# Patient Record
Sex: MALE | HISPANIC OR LATINO | Employment: PART TIME | URBAN - METROPOLITAN AREA
[De-identification: names, ages, dates, MRNs, and addresses within clinical notes are randomized per-mention and may not be internally consistent; named-entity substitution may affect disease eponyms.]

---

## 2022-11-30 ENCOUNTER — HOSPITAL ENCOUNTER (INPATIENT)
Facility: MEDICAL CENTER | Age: 57
LOS: 15 days | DRG: 025 | End: 2022-12-15
Attending: EMERGENCY MEDICINE | Admitting: HOSPITALIST
Payer: COMMERCIAL

## 2022-11-30 ENCOUNTER — APPOINTMENT (OUTPATIENT)
Dept: RADIOLOGY | Facility: MEDICAL CENTER | Age: 57
DRG: 025 | End: 2022-11-30
Attending: EMERGENCY MEDICINE
Payer: COMMERCIAL

## 2022-11-30 DIAGNOSIS — G93.6 CEREBRAL EDEMA (HCC): ICD-10-CM

## 2022-11-30 DIAGNOSIS — D72.829 LEUKOCYTOSIS, UNSPECIFIED TYPE: ICD-10-CM

## 2022-11-30 DIAGNOSIS — R51.9 NONINTRACTABLE HEADACHE, UNSPECIFIED CHRONICITY PATTERN, UNSPECIFIED HEADACHE TYPE: ICD-10-CM

## 2022-11-30 DIAGNOSIS — R26.81 GAIT INSTABILITY: ICD-10-CM

## 2022-11-30 DIAGNOSIS — G93.89 BRAIN MASS: ICD-10-CM

## 2022-11-30 DIAGNOSIS — R10.84 GENERALIZED ABDOMINAL PAIN: ICD-10-CM

## 2022-11-30 DIAGNOSIS — I63.531 ACUTE ISCHEMIC RIGHT PCA STROKE (HCC): ICD-10-CM

## 2022-11-30 DIAGNOSIS — I10 PRIMARY HYPERTENSION: Chronic | ICD-10-CM

## 2022-11-30 DIAGNOSIS — D32.0 MENINGIOMA, CEREBRAL (HCC): ICD-10-CM

## 2022-11-30 DIAGNOSIS — G93.5 BRAIN COMPRESSION (HCC): ICD-10-CM

## 2022-11-30 DIAGNOSIS — Z99.11 ON MECHANICALLY ASSISTED VENTILATION (HCC): ICD-10-CM

## 2022-11-30 DIAGNOSIS — G89.18 POSTOPERATIVE PAIN: ICD-10-CM

## 2022-11-30 PROBLEM — E87.1 HYPONATREMIA: Status: ACTIVE | Noted: 2022-11-30

## 2022-11-30 PROBLEM — R73.9 HYPERGLYCEMIA: Status: ACTIVE | Noted: 2022-11-30

## 2022-11-30 LAB
EST. AVERAGE GLUCOSE BLD GHB EST-MCNC: 137 MG/DL
HBA1C MFR BLD: 6.4 % (ref 4–5.6)
MAGNESIUM SERPL-MCNC: 1.9 MG/DL (ref 1.5–2.5)

## 2022-11-30 PROCEDURE — 99223 1ST HOSP IP/OBS HIGH 75: CPT | Performed by: HOSPITALIST

## 2022-11-30 PROCEDURE — 36415 COLL VENOUS BLD VENIPUNCTURE: CPT

## 2022-11-30 PROCEDURE — 770001 HCHG ROOM/CARE - MED/SURG/GYN PRIV*

## 2022-11-30 PROCEDURE — 83036 HEMOGLOBIN GLYCOSYLATED A1C: CPT

## 2022-11-30 PROCEDURE — 700102 HCHG RX REV CODE 250 W/ 637 OVERRIDE(OP): Performed by: HOSPITALIST

## 2022-11-30 PROCEDURE — 700117 HCHG RX CONTRAST REV CODE 255: Performed by: EMERGENCY MEDICINE

## 2022-11-30 PROCEDURE — 71260 CT THORAX DX C+: CPT

## 2022-11-30 PROCEDURE — A9270 NON-COVERED ITEM OR SERVICE: HCPCS | Performed by: HOSPITALIST

## 2022-11-30 PROCEDURE — 99285 EMERGENCY DEPT VISIT HI MDM: CPT

## 2022-11-30 PROCEDURE — 83735 ASSAY OF MAGNESIUM: CPT

## 2022-11-30 RX ORDER — POLYETHYLENE GLYCOL 3350 17 G/17G
1 POWDER, FOR SOLUTION ORAL
Status: DISCONTINUED | OUTPATIENT
Start: 2022-11-30 | End: 2022-12-07

## 2022-11-30 RX ORDER — DEXAMETHASONE SODIUM PHOSPHATE 4 MG/ML
4 INJECTION, SOLUTION INTRA-ARTICULAR; INTRALESIONAL; INTRAMUSCULAR; INTRAVENOUS; SOFT TISSUE EVERY 6 HOURS
Status: DISCONTINUED | OUTPATIENT
Start: 2022-12-01 | End: 2022-12-10

## 2022-11-30 RX ORDER — BISACODYL 10 MG
10 SUPPOSITORY, RECTAL RECTAL
Status: DISCONTINUED | OUTPATIENT
Start: 2022-11-30 | End: 2022-12-07

## 2022-11-30 RX ORDER — ONDANSETRON 2 MG/ML
4 INJECTION INTRAMUSCULAR; INTRAVENOUS EVERY 4 HOURS PRN
Status: DISCONTINUED | OUTPATIENT
Start: 2022-11-30 | End: 2022-12-07

## 2022-11-30 RX ORDER — ONDANSETRON 4 MG/1
4 TABLET, ORALLY DISINTEGRATING ORAL EVERY 4 HOURS PRN
Status: DISCONTINUED | OUTPATIENT
Start: 2022-11-30 | End: 2022-12-15 | Stop reason: HOSPADM

## 2022-11-30 RX ORDER — HYDRALAZINE HYDROCHLORIDE 20 MG/ML
10 INJECTION INTRAMUSCULAR; INTRAVENOUS EVERY 4 HOURS PRN
Status: DISCONTINUED | OUTPATIENT
Start: 2022-11-30 | End: 2022-12-07

## 2022-11-30 RX ORDER — LOSARTAN POTASSIUM 50 MG/1
50 TABLET ORAL 2 TIMES DAILY
Status: ON HOLD | COMMUNITY
End: 2022-12-12 | Stop reason: SDUPTHER

## 2022-11-30 RX ORDER — ACETAMINOPHEN 325 MG/1
650 TABLET ORAL EVERY 6 HOURS PRN
Status: DISCONTINUED | OUTPATIENT
Start: 2022-11-30 | End: 2022-12-07

## 2022-11-30 RX ORDER — LOSARTAN POTASSIUM 50 MG/1
50 TABLET ORAL 2 TIMES DAILY
Status: DISCONTINUED | OUTPATIENT
Start: 2022-11-30 | End: 2022-12-08

## 2022-11-30 RX ORDER — PROMETHAZINE HYDROCHLORIDE 25 MG/1
12.5-25 SUPPOSITORY RECTAL EVERY 4 HOURS PRN
Status: DISCONTINUED | OUTPATIENT
Start: 2022-11-30 | End: 2022-12-15 | Stop reason: HOSPADM

## 2022-11-30 RX ORDER — PROMETHAZINE HYDROCHLORIDE 25 MG/1
12.5-25 TABLET ORAL EVERY 4 HOURS PRN
Status: DISCONTINUED | OUTPATIENT
Start: 2022-11-30 | End: 2022-12-15 | Stop reason: HOSPADM

## 2022-11-30 RX ORDER — PROCHLORPERAZINE EDISYLATE 5 MG/ML
5-10 INJECTION INTRAMUSCULAR; INTRAVENOUS EVERY 4 HOURS PRN
Status: DISCONTINUED | OUTPATIENT
Start: 2022-11-30 | End: 2022-12-15 | Stop reason: HOSPADM

## 2022-11-30 RX ORDER — LEVETIRACETAM 500 MG/1
500 TABLET ORAL 2 TIMES DAILY
Status: DISCONTINUED | OUTPATIENT
Start: 2022-11-30 | End: 2022-12-08

## 2022-11-30 RX ORDER — AMOXICILLIN 250 MG
2 CAPSULE ORAL 2 TIMES DAILY
Status: DISCONTINUED | OUTPATIENT
Start: 2022-12-01 | End: 2022-12-07

## 2022-11-30 RX ADMIN — IOHEXOL 100 ML: 350 INJECTION, SOLUTION INTRAVENOUS at 22:00

## 2022-11-30 RX ADMIN — LEVETIRACETAM 500 MG: 500 TABLET, FILM COATED ORAL at 22:12

## 2022-11-30 RX ADMIN — LOSARTAN POTASSIUM 50 MG: 50 TABLET, FILM COATED ORAL at 22:12

## 2022-11-30 ASSESSMENT — FIBROSIS 4 INDEX: FIB4 SCORE: 1.23

## 2022-11-30 ASSESSMENT — ENCOUNTER SYMPTOMS
CLAUDICATION: 0
VOMITING: 0
NAUSEA: 0
HEADACHES: 1
HEMOPTYSIS: 0
BLURRED VISION: 0
COUGH: 0
WHEEZING: 0
PND: 0
HEARTBURN: 0
DEPRESSION: 0
DOUBLE VISION: 0
DIZZINESS: 0
FEVER: 0
CHILLS: 0
BACK PAIN: 0
MYALGIAS: 0
BRUISES/BLEEDS EASILY: 0
PALPITATIONS: 0

## 2022-12-01 ENCOUNTER — APPOINTMENT (OUTPATIENT)
Dept: RADIOLOGY | Facility: MEDICAL CENTER | Age: 57
DRG: 025 | End: 2022-12-01
Attending: NEUROLOGICAL SURGERY
Payer: COMMERCIAL

## 2022-12-01 LAB
ALBUMIN SERPL BCP-MCNC: 3.9 G/DL (ref 3.2–4.9)
ALBUMIN/GLOB SERPL: 1.7 G/DL
ALP SERPL-CCNC: 108 U/L (ref 30–99)
ALT SERPL-CCNC: 14 U/L (ref 2–50)
ANION GAP SERPL CALC-SCNC: 12 MMOL/L (ref 7–16)
AST SERPL-CCNC: 13 U/L (ref 12–45)
BILIRUB SERPL-MCNC: 0.6 MG/DL (ref 0.1–1.5)
BUN SERPL-MCNC: 17 MG/DL (ref 8–22)
CALCIUM SERPL-MCNC: 9.6 MG/DL (ref 8.5–10.5)
CHLORIDE SERPL-SCNC: 101 MMOL/L (ref 96–112)
CO2 SERPL-SCNC: 25 MMOL/L (ref 20–33)
CREAT SERPL-MCNC: 0.74 MG/DL (ref 0.5–1.4)
ERYTHROCYTE [DISTWIDTH] IN BLOOD BY AUTOMATED COUNT: 46.1 FL (ref 35.9–50)
GFR SERPLBLD CREATININE-BSD FMLA CKD-EPI: 106 ML/MIN/1.73 M 2
GLOBULIN SER CALC-MCNC: 2.3 G/DL (ref 1.9–3.5)
GLUCOSE SERPL-MCNC: 156 MG/DL (ref 65–99)
HCT VFR BLD AUTO: 45.1 % (ref 42–52)
HGB BLD-MCNC: 15.1 G/DL (ref 14–18)
MCH RBC QN AUTO: 29.6 PG (ref 27–33)
MCHC RBC AUTO-ENTMCNC: 33.5 G/DL (ref 33.7–35.3)
MCV RBC AUTO: 88.4 FL (ref 81.4–97.8)
PLATELET # BLD AUTO: 217 K/UL (ref 164–446)
PMV BLD AUTO: 9.1 FL (ref 9–12.9)
POTASSIUM SERPL-SCNC: 4.3 MMOL/L (ref 3.6–5.5)
PROT SERPL-MCNC: 6.2 G/DL (ref 6–8.2)
RBC # BLD AUTO: 5.1 M/UL (ref 4.7–6.1)
SODIUM SERPL-SCNC: 138 MMOL/L (ref 135–145)
WBC # BLD AUTO: 12.7 K/UL (ref 4.8–10.8)

## 2022-12-01 PROCEDURE — 85027 COMPLETE CBC AUTOMATED: CPT

## 2022-12-01 PROCEDURE — 770001 HCHG ROOM/CARE - MED/SURG/GYN PRIV*

## 2022-12-01 PROCEDURE — 700111 HCHG RX REV CODE 636 W/ 250 OVERRIDE (IP): Performed by: HOSPITALIST

## 2022-12-01 PROCEDURE — A9576 INJ PROHANCE MULTIPACK: HCPCS | Performed by: NEUROLOGICAL SURGERY

## 2022-12-01 PROCEDURE — 700117 HCHG RX CONTRAST REV CODE 255: Performed by: NEUROLOGICAL SURGERY

## 2022-12-01 PROCEDURE — 99232 SBSQ HOSP IP/OBS MODERATE 35: CPT | Performed by: INTERNAL MEDICINE

## 2022-12-01 PROCEDURE — 36415 COLL VENOUS BLD VENIPUNCTURE: CPT

## 2022-12-01 PROCEDURE — 96376 TX/PRO/DX INJ SAME DRUG ADON: CPT

## 2022-12-01 PROCEDURE — 70553 MRI BRAIN STEM W/O & W/DYE: CPT

## 2022-12-01 PROCEDURE — 96374 THER/PROPH/DIAG INJ IV PUSH: CPT

## 2022-12-01 PROCEDURE — A9270 NON-COVERED ITEM OR SERVICE: HCPCS | Performed by: HOSPITALIST

## 2022-12-01 PROCEDURE — 700102 HCHG RX REV CODE 250 W/ 637 OVERRIDE(OP): Performed by: HOSPITALIST

## 2022-12-01 PROCEDURE — 80053 COMPREHEN METABOLIC PANEL: CPT

## 2022-12-01 RX ADMIN — DEXAMETHASONE SODIUM PHOSPHATE 4 MG: 4 INJECTION, SOLUTION INTRA-ARTICULAR; INTRALESIONAL; INTRAMUSCULAR; INTRAVENOUS; SOFT TISSUE at 17:39

## 2022-12-01 RX ADMIN — LEVETIRACETAM 500 MG: 500 TABLET, FILM COATED ORAL at 06:12

## 2022-12-01 RX ADMIN — LOSARTAN POTASSIUM 50 MG: 50 TABLET, FILM COATED ORAL at 06:12

## 2022-12-01 RX ADMIN — DOCUSATE SODIUM 50 MG AND SENNOSIDES 8.6 MG 2 TABLET: 8.6; 5 TABLET, FILM COATED ORAL at 17:39

## 2022-12-01 RX ADMIN — GADOTERIDOL 20 ML: 279.3 INJECTION, SOLUTION INTRAVENOUS at 02:34

## 2022-12-01 RX ADMIN — LEVETIRACETAM 500 MG: 500 TABLET, FILM COATED ORAL at 17:39

## 2022-12-01 RX ADMIN — DEXAMETHASONE SODIUM PHOSPHATE 4 MG: 4 INJECTION, SOLUTION INTRA-ARTICULAR; INTRALESIONAL; INTRAMUSCULAR; INTRAVENOUS; SOFT TISSUE at 12:07

## 2022-12-01 RX ADMIN — DEXAMETHASONE SODIUM PHOSPHATE 4 MG: 4 INJECTION, SOLUTION INTRA-ARTICULAR; INTRALESIONAL; INTRAMUSCULAR; INTRAVENOUS; SOFT TISSUE at 01:32

## 2022-12-01 RX ADMIN — DOCUSATE SODIUM 50 MG AND SENNOSIDES 8.6 MG 2 TABLET: 8.6; 5 TABLET, FILM COATED ORAL at 06:12

## 2022-12-01 RX ADMIN — LOSARTAN POTASSIUM 50 MG: 50 TABLET, FILM COATED ORAL at 17:39

## 2022-12-01 RX ADMIN — DEXAMETHASONE SODIUM PHOSPHATE 4 MG: 4 INJECTION, SOLUTION INTRA-ARTICULAR; INTRALESIONAL; INTRAMUSCULAR; INTRAVENOUS; SOFT TISSUE at 06:13

## 2022-12-01 ASSESSMENT — COGNITIVE AND FUNCTIONAL STATUS - GENERAL
MOBILITY SCORE: 24
SUGGESTED CMS G CODE MODIFIER DAILY ACTIVITY: CH
DAILY ACTIVITIY SCORE: 24
SUGGESTED CMS G CODE MODIFIER MOBILITY: CH

## 2022-12-01 ASSESSMENT — PAIN DESCRIPTION - PAIN TYPE
TYPE: ACUTE PAIN

## 2022-12-01 ASSESSMENT — ENCOUNTER SYMPTOMS
MYALGIAS: 0
NAUSEA: 0
BLURRED VISION: 1
FOCAL WEAKNESS: 0
SPEECH CHANGE: 0
NERVOUS/ANXIOUS: 0
SHORTNESS OF BREATH: 0
ABDOMINAL PAIN: 0
HEADACHES: 0

## 2022-12-01 NOTE — ED TRIAGE NOTES
"Chief Complaint   Patient presents with    Headache     Pt states HA has been going on for 2 months intermittently. Pt states pain is in the back of the head and is a 6-7/10. Pt seen at [facility] in [facility] where CT shows occipital mass with shift. Pt sent here for evaluation by Neurology.        BP (!) 159/97   Pulse 79   Temp (!) 35.8 °C (96.4 °F) (Temporal)   Resp 16   Ht 1.778 m (5' 10\")   Wt 96.6 kg (213 lb)   SpO2 98% Comment: RA  BMI 30.56 kg/m²     "

## 2022-12-01 NOTE — ASSESSMENT & PLAN NOTE
"12/10/2022:  Blood pressures controlled.  Goal of less than 130/80.  Resume home losartan at lower dose of 50 mg daily.    12/11/2022:  Blood pressures well controlled.  Continuing on losartan 50 mg daily.\"    Vitals:    12/13/22 2315   BP: 110/68   Pulse: (!) 110   Resp: 18   SpO2: 92%     Stable.  "

## 2022-12-01 NOTE — PROGRESS NOTES
Report received by PERCY Ramos. Patient arrived on unit from the Er via transport. Patient is aox4. 4 eye complete. Patient is stand by assist to bathroom. Patient currently doesn't have a headache, no other deficits at this time from the brain mass. Family at bedside.

## 2022-12-01 NOTE — ED NOTES
Spoke with IR who stated pt will go to IR on Monday around 0800 and possible resection Tuesday by Neurosurgery.

## 2022-12-01 NOTE — H&P
Hospital Medicine History & Physical Note    Date of Service  11/30/2022    Primary Care Physician  Pcp Pt States None    Consultants  neurosurgery    Code Status  Full Code    Chief Complaint  Chief Complaint   Patient presents with    Headache     Pt states HA has been going on for 2 months intermittently. Pt states pain is in the back of the head and is a 6-7/10. Pt seen at [facility] in [facility] where CT shows occipital mass with shift. Pt sent here for evaluation by Neurology.        History of Presenting Illness  Ambrosio Hardy is a 57 y.o. male who presented 11/30/2022 with past medical history of hypertension, is coming today complaining of headaches, apparently patient has been having headache for at least 2 months, patient's headache is moderate to severe, patient denies any focal weakness no numbness no tingling, patient has intermittent blurry vision but at this time and he is not having any vision problems, denies any any neck pain no dizziness lightheadedness no chest pain no palpitations, patient denies any family history of cancer, patient is not a smoker does not drink alcohol, patient was evaluated that local hospital where he had a CT head that showed possible occipital mass with midline shift patient was sent to [facility] for neurosurgery evaluation, at this time patient is going to go for MRI brain, will get CT chest abdomen and pelvis, patient is started on Keppra and Decadron, neurosurgery has been informed, will admit patient, close monitoring, neurochecks, seizure precautions, I have discussed case and plan of care with patient patient's family ERP request have been answered..        Review of Systems  Review of Systems   Constitutional:  Negative for chills and fever.   Eyes:  Negative for blurred vision and double vision.   Respiratory:  Negative for cough, hemoptysis and wheezing.    Cardiovascular:  Negative for chest pain, palpitations, claudication, leg swelling  and PND.   Gastrointestinal:  Negative for heartburn, nausea and vomiting.   Genitourinary:  Negative for hematuria and urgency.   Musculoskeletal:  Negative for back pain and myalgias.   Skin:  Negative for rash.   Neurological:  Positive for headaches. Negative for dizziness.   Endo/Heme/Allergies:  Does not bruise/bleed easily.   Psychiatric/Behavioral:  Negative for depression.      Past Medical History   has a past medical history of Hypertension and Known health problems: none.    Surgical History   has no past surgical history on file.     Family History    Family history reviewed with patient. There is no family history that is pertinent to the chief complaint.     Social History   reports that he has never smoked. He does not have any smokeless tobacco history on file. He reports that he does not drink alcohol and does not use drugs.    Allergies  No Known Allergies    Medications  Prior to Admission Medications   Prescriptions Last Dose Informant Patient Reported? Taking?   losartan (COZAAR) 50 MG Tab 11/30/2022 at am  Yes Yes   Sig: Take 50 mg by mouth 2 times a day.      Facility-Administered Medications: None       Physical Exam  Temp:  [35.8 °C (96.4 °F)-37 °C (98.6 °F)] 35.8 °C (96.4 °F)  Pulse:  [64-84] 79  Resp:  [12-16] 16  BP: (136-178)/() 159/97  SpO2:  [95 %-98 %] 98 %  Blood Pressure: (!) 159/97   Temperature: (!) 35.8 °C (96.4 °F)   Pulse: 79   Respiration: 16   Pulse Oximetry: 98 % (RA)       Physical Exam  Vitals and nursing note reviewed.   Constitutional:       General: He is not in acute distress.     Appearance: Normal appearance. He is not ill-appearing.   HENT:      Head: Normocephalic.      Nose: Nose normal.      Mouth/Throat:      Mouth: Mucous membranes are moist.      Pharynx: Oropharynx is clear.   Eyes:      General: No scleral icterus.        Right eye: No discharge.         Left eye: No discharge.      Conjunctiva/sclera: Conjunctivae normal.      Pupils: Pupils are  equal, round, and reactive to light.   Cardiovascular:      Rate and Rhythm: Normal rate and regular rhythm.      Pulses: Normal pulses.      Heart sounds: Normal heart sounds.   Pulmonary:      Effort: Pulmonary effort is normal. No respiratory distress.      Breath sounds: Normal breath sounds. No wheezing or rales.   Abdominal:      General: Bowel sounds are normal. There is no distension.      Palpations: Abdomen is soft.      Tenderness: There is no abdominal tenderness. There is no guarding.   Musculoskeletal:         General: Normal range of motion.      Cervical back: Normal range of motion and neck supple. No rigidity or tenderness.      Right lower leg: No edema.      Left lower leg: No edema.   Skin:     General: Skin is warm and dry.      Capillary Refill: Capillary refill takes less than 2 seconds.      Coloration: Skin is not jaundiced.      Findings: No bruising.   Neurological:      General: No focal deficit present.      Mental Status: He is alert and oriented to person, place, and time.      Cranial Nerves: No cranial nerve deficit.      Motor: No weakness.   Psychiatric:         Mood and Affect: Mood normal.         Behavior: Behavior normal.       Laboratory:  Recent Labs     11/30/22  1716   WBC 13.3*   RBC 5.01   HEMOGLOBIN 15.1   HEMATOCRIT 44.7   MCV 89.2   MCH 30.1   MCHC 33.8   RDW 14.6*   PLATELETCT 218   MPV 8.9     Recent Labs     11/30/22  1716   SODIUM 135*   POTASSIUM 4.1   CHLORIDE 104   CO2 24   GLUCOSE 126*   BUN 19   CREATININE 0.7   CALCIUM 8.9     Recent Labs     11/30/22  1716   ALTSGPT 22   ASTSGOT 22   ALKPHOSPHAT 83   TBILIRUBIN 0.6   DBILIRUBIN 0.2   GLUCOSE 126*     Recent Labs     11/30/22  1716   INR 0.97     No results for input(s): NTPROBNP in the last 72 hours.      No results for input(s): TROPONINT in the last 72 hours.    Imaging:  CT-CHEST,ABDOMEN,PELVIS WITH    (Results Pending)   MR-BRAIN-WITH & W/O    (Results Pending)   MR-STEALTH BRAIN WITH & W/O    (Results  Pending)           Assessment/Plan:  Justification for Admission Status  I anticipate this patient will require at least two midnights for appropriate medical management, necessitating inpatient admission because patient will require MRI CT chest abdomen and pelvis , neurosurgical evaluation might require surgical intervention.        * Brain mass- (present on admission)  Assessment & Plan  Neurosurgery consulted  Started on keppra, decadron  Will need MRI brain  Pan CT to assess for mets  neurochecks per protocol  No focal neuro deficit.     Hyponatremia- (present on admission)  Assessment & Plan  Like due to mild hyperglycemia.   Continue close monitoring  Avoid more severe hyponatremia in the setting of brain mass  Sodium q8hrs.     Hyperglycemia- (present on admission)  Assessment & Plan  Will check a1c  Received decadron today.     Hypertension- (present on admission)  Assessment & Plan  Continue home medication  Prn hydralazine      VTE prophylaxis: SCDs/TEDs

## 2022-12-01 NOTE — PROGRESS NOTES
Hospital Medicine Daily Progress Note    Date of Service  12/1/2022    Chief Complaint  Ambrosio Hardy is a 57 y.o. male admitted 11/30/2022 with headache    Hospital Course  56 yo man with HTN who presented with headaches for 2 months, with blurry vision and pain is severe. CTH showed occipital mass with midline shift at [facility] and sent to [facility]. Dr. Donis with neurosurgery was consulted. He is started on decadron and keppra. CT C/A/P did not show any other tumors.    Interval Problem Update  His headache is doing better, denies any pain.  I spoke with Dr. Donis, patient will transfer to his service for IR procedure and surgery after the weekend, medicine not needed to follow the patient. Medicine will sign off.    I have discussed this patient's plan of care and discharge plan at IDT rounds today with Case Management, Nursing, Nursing leadership, and other members of the IDT team.    Consultants/Specialty  neurosurgery    Code Status  Full Code    Disposition  Patient is not medically cleared for discharge.   Anticipate discharge to to home with close outpatient follow-up.  I have placed the appropriate orders for post-discharge needs.    Review of Systems  Review of Systems   Constitutional:  Negative for malaise/fatigue.   Eyes:  Positive for blurred vision.   Respiratory:  Negative for shortness of breath.    Cardiovascular:  Negative for chest pain.   Gastrointestinal:  Negative for abdominal pain and nausea.   Musculoskeletal:  Negative for myalgias.   Neurological:  Negative for speech change, focal weakness and headaches.   Psychiatric/Behavioral:  The patient is not nervous/anxious.       Physical Exam  Temp:  [35.8 °C (96.4 °F)-37 °C (98.6 °F)] 36.9 °C (98.4 °F)  Pulse:  [60-93] 78  Resp:  [12-18] 16  BP: (111-178)/() 136/78  SpO2:  [91 %-98 %] 97 %    Physical Exam  Vitals and nursing note reviewed.   Constitutional:       General: He is not in acute distress.     Appearance: He  is not toxic-appearing.   HENT:      Mouth/Throat:      Mouth: Mucous membranes are moist.   Eyes:      General:         Right eye: No discharge.         Left eye: No discharge.      Extraocular Movements: Extraocular movements intact.      Pupils: Pupils are equal, round, and reactive to light.   Cardiovascular:      Rate and Rhythm: Normal rate and regular rhythm.   Pulmonary:      Effort: Pulmonary effort is normal. No respiratory distress.      Breath sounds: No wheezing or rales.   Abdominal:      Palpations: Abdomen is soft.      Tenderness: There is no abdominal tenderness.   Musculoskeletal:         General: No swelling.      Cervical back: Neck supple.   Skin:     General: Skin is warm and dry.   Neurological:      General: No focal deficit present.      Mental Status: He is alert and oriented to person, place, and time.       Fluids  No intake or output data in the 24 hours ending 12/01/22 1411    Laboratory  Recent Labs     11/30/22  1716 12/01/22  0236   WBC 13.3* 12.7*   RBC 5.01 5.10   HEMOGLOBIN 15.1 15.1   HEMATOCRIT 44.7 45.1   MCV 89.2 88.4   MCH 30.1 29.6   MCHC 33.8 33.5*   RDW 14.6* 46.1   PLATELETCT 218 217   MPV 8.9 9.1     Recent Labs     11/30/22  1716 12/01/22  0236   SODIUM 135* 138   POTASSIUM 4.1 4.3   CHLORIDE 104 101   CO2 24 25   GLUCOSE 126* 156*   BUN 19 17   CREATININE 0.7 0.74   CALCIUM 8.9 9.6     Recent Labs     11/30/22  1716   INR 0.97               Imaging  MR-STEALTH BRAIN WITH & W/O   Final Result      There is an approximately 6.7 x 4.7 x 5.9 cm sized enhancing extra-axial mass noted in the bilateral mid posterior parietal region along the both sides of posterior falx cerebri. The superior sagittal sinus is infiltrated and surrounded by this tumor.    There is also abnormal adjacent dural enhancement .The adjacent parietal/occipital bones are infiltrated by this tumor. There is also hyperostosis of the calvarial surface of the bones with enhancement. There are multiple  enlarged blood vessels are noted    along the anterior margin of the lesion. There is diffuse white matter edema noted in the bilateral parietal and occipital lobes surrounding the lesion.There is an approximately 6 mm midline shift towards right side. 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 The other differential diagnosis includes hemangiopericytoma and dural based metastasis. Findings were discussed with SUSY PRATT on 12/1/2022 7:43 AM.      MR-BRAIN-WITH & W/O   Final Result      There is an approximately 6.7 x 4.7 x 5.9 cm sized enhancing extra-axial mass noted in the bilateral mid posterior parietal region along the both sides of posterior falx cerebri. The superior sagittal sinus is infiltrated and surrounded by this tumor.    There is also abnormal adjacent dural enhancement .The adjacent parietal/occipital bones are infiltrated by this tumor. There is also hyperostosis of the calvarial surface of the bones with enhancement. There are multiple enlarged blood vessels are noted    along the anterior margin of the lesion. There is diffuse white matter edema noted in the bilateral parietal and occipital lobes surrounding the lesion.There is an approximately 6 mm midline shift towards right side. 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 The other differential diagnosis includes hemangiopericytoma and dural based metastasis. Findings were discussed with SUSY PRATT on 12/1/2022 7:43 AM.      CT-CHEST,ABDOMEN,PELVIS WITH   Final Result         1.  No significant abnormality in thorax, abdomen and pelvis CT scan.   2.  Hepatomegaly   3.  Cholelithiasis   4.  Segment 2 duodenal diverticula   5.  Atherosclerosis   6.  Fat-containing left inguinal hernia   7.  Fat-containing umbilical hernia   8.  Diverticulosis   9.  Main pulmonary artery  dilatation, appearance suggests changes related to pulmonary artery aneurysm or pulmonary hypertension.      IR-EMBOLIZE-NEURO-INTRACRANIAL    (Results Pending)        Assessment/Plan  * Brain mass- (present on admission)  Assessment & Plan  Neurosurgery consulted  Started on keppra, decadron  MRI brain done, patient to transfer to Dr. Donis's service for further management    Hyponatremia- (present on admission)  Assessment & Plan  Resolved    Hyperglycemia- (present on admission)  Assessment & Plan  A1c 6.4%  Received decadron today.     Hypertension- (present on admission)  Assessment & Plan  Continue home losartan  Prn hydralazine       VTE prophylaxis: SCDs/TEDs    I have performed a physical exam and reviewed and updated ROS and Plan today (12/1/2022). In review of yesterday's note (11/30/2022), there are no changes except as documented above.

## 2022-12-01 NOTE — CONSULTS
Patient presented outside hospital with headache  CT head from outside hospital reportedly has osseous occipital mass with possible intracranial metastases versus dural based lesion per report.    Assessment and plan  Admit to [facility]  Normal diet  Dexamethasone 4 every 6  Keppra 500 twice daily  Chest abdomen pelvis with and without contrast  MRI of the brain with and without contrast, Stealth MRI ordered      Full note to follow in the morning after imaging completed

## 2022-12-01 NOTE — ASSESSMENT & PLAN NOTE
A1c 6.4%    12/10/2022:  Blood glucose worsening into the 200s secondary to dexamethasone.  Start glargine insulin 5 units daily  Continue lispro insulin sliding scale with hypoglycemia protocol    12/11/2022  Blood glucose control improving to 150s to 190s.  Continue glargine 5 units daily with lispro SSI scale and hypoglycemia protocol.  Regular diet changed to carbohydrate consistent diet.

## 2022-12-01 NOTE — ED NOTES
MRI screening complete with  Shahla [id number]. Phlebotomist contacted for blood draw. Pt medicated per MAR.

## 2022-12-01 NOTE — ED PROVIDER NOTES
ER Provider Note     Scribed for Cooper Reddy M.D. by Adan Cruz. 11/30/2022, 8:35 PM.    Primary Care Provider: Pcp Pt States None  Means of Arrival: Walk in   History obtained from: Patient  History limited by: None     CHIEF COMPLAINT  Chief Complaint   Patient presents with    Headache     Pt states HA has been going on for 2 months intermittently. Pt states pain is in the back of the head and is a 6-7/10. Pt seen at [facility] in [facility] where CT shows occipital mass with shift. Pt sent here for evaluation by Neurology.        HPI  Ambrosio Hardy is a 57 y.o. male who presents to the Emergency Department for evaluation of a headache which has been ongoing for 2 months intermittently. He was seen at [facility] in [facility] where CT shows occipital mass with shift. He was sent here for evaluation by neurology. Denies any trauma. The headache does not radiate. It is not positional. The headache is non-exertional. He reported blurry vision which is currently resolved.  No fevers, neck pain, or weight loss.    REVIEW OF SYSTEMS  See HPI for further details. All other systems are negative.     PAST MEDICAL HISTORY   has a past medical history of Hypertension and Known health problems: none.    SURGICAL HISTORY  patient denies any surgical history    SOCIAL HISTORY  Social History     Tobacco Use    Smoking status: Never   Vaping Use    Vaping Use: Never used   Substance Use Topics    Alcohol use: No    Drug use: No      Social History     Substance and Sexual Activity   Drug Use No       FAMILY HISTORY  No family history on file.    CURRENT MEDICATIONS  Home Medications       Reviewed by Casie Fry (Pharmacy Tech) on 11/30/22 at 2052  Med List Status: Complete     Medication Last Dose Status   losartan (COZAAR) 50 MG Tab 11/30/2022 Active                    ALLERGIES  No Known Allergies    PHYSICAL EXAM  VITAL SIGNS: /74   Pulse 63   Temp 36.1 °C (97 °F) (Temporal)  "  Resp 16   Ht 1.778 m (5' 10\")   Wt 96.6 kg (213 lb)   SpO2 95%   BMI 30.56 kg/m²    Constitutional: Alert in no apparent distress.  HENT: No signs of trauma, Bilateral external ears normal, Nose normal.   Eyes: Pupils are equal and reactive, Conjunctiva normal, Non-icteric.   Neck: Normal range of motion, No tenderness, Supple, No stridor.   Lymphatic: No lymphadenopathy noted.   Cardiovascular: Regular rate and rhythm, no palpable thrill  Thorax & Lungs: No respiratory distress,  No chest tenderness.  CTAB  Abdomen: Bowel sounds normal, Soft, No tenderness, No masses, No pulsatile masses. No peritoneal signs.  Skin: Warm, Dry, No erythema, No rash.   Back: No bony tenderness, No CVA tenderness.   Extremities: Intact distal pulses, No edema, No tenderness, No cyanosis.  Musculoskeletal: Good range of motion in all major joints. No tenderness to palpation or major deformities noted.   Neurologic: Alert , Normal motor function, Normal sensory function, No focal deficits noted.   Symmetric smile, eyes shut tight bilaterally, forehead wrinkles bilaterally, sensation intact to light touch bilateral face, tongue midline, head turn and shoulder shrug with full strength. Hearing intact grossly bilaterally. 5/5  strength bilaterally, 5/5 tricep and bicep strength bilaterally. Sensation intact to light touch r, m, u, axillary nerves bilaterally. 5/5 strength quadricep, plantarflexion/dorsiflexion/extensor hallicus longus bilaterally. Sensation intact to light touch bilateral lower extremities in all nerve distributions.  Intact finger-to-nose   Psychiatric: Affect normal, Judgment normal, Mood normal.     DIAGNOSTIC STUDIES / PROCEDURES    LABS  Labs Reviewed   HEMOGLOBIN A1C - Abnormal; Notable for the following components:       Result Value    Glycohemoglobin 6.4 (*)     All other components within normal limits   MAGNESIUM   CBC WITHOUT DIFFERENTIAL   COMP METABOLIC PANEL     All labs reviewed by " me.    RADIOLOGY  CT-CHEST,ABDOMEN,PELVIS WITH   Final Result         1.  No significant abnormality in thorax, abdomen and pelvis CT scan.   2.  Hepatomegaly   3.  Cholelithiasis   4.  Segment 2 duodenal diverticula   5.  Atherosclerosis   6.  Fat-containing left inguinal hernia   7.  Fat-containing umbilical hernia   8.  Diverticulosis   9.  Main pulmonary artery dilatation, appearance suggests changes related to pulmonary artery aneurysm or pulmonary hypertension.      MR-BRAIN-WITH & W/O    (Results Pending)   MR-STEALTH BRAIN WITH & W/O    (Results Pending)      The radiologist's interpretation of all radiological studies have been reviewed by me.    COURSE & MEDICAL DECISION MAKING  Pertinent Labs & Imaging studies reviewed. (See chart for details)    This is a 57 y.o. male that presents with headache and a history of brain mass.  At this time we are concerned the brain mass is causing the patient's headache.  We will consult neurosurgery.  Labs were performed earlier today which regarding to be repeated..     8:35 PM - Patient seen and examined at bedside. Ordered CT chest-abdomen-pelvis, labs.       The neurosurgeon asked for CT of the chest abdomen as well as pelvis.  This was performed and shows no significant abnormalities.  Labs were performed which shows no significant abnormality on CBC or CMP.  Hemoglobin A1c is 6.4.  MRI of the brain was ordered by the neurosurgeon.  We will admit the patient to the hospitalist.  Keppra and dexamethasone given by hospitalist.    The total critical care time on this patient is 30 minutes, resuscitating patient, speaking with admitting physician, and deciphering test results. This  is exclusive of separately billable procedures.      FINAL IMPRESSION  1. Brain mass    2. Nonintractable headache, unspecified chronicity pattern, unspecified headache type          I, Adan Cruz (Scribe), am scribing for, and in the presence of, Cooper Reddy,  M.D..    Electronically signed by: Adan Cruz (Scribe), 11/30/2022    ICooper M.D. personally performed the services described in this documentation, as scribed by Adan Cruz in my presence, and it is both accurate and complete.     The note accurately reflects work and decisions made by me.  Cooper Reddy M.D.  11/30/2022  11:45 PM

## 2022-12-01 NOTE — PROGRESS NOTES
4 Eyes Skin Assessment Completed by PERCY Stevens and PERCY Joshi.    Head WDL  Ears WDL  Nose WDL  Mouth WDL  Neck WDL  Breast/Chest WDL  Shoulder Blades WDL  Spine WDL  (R) Arm/Elbow/Hand WDL  (L) Arm/Elbow/Hand WDL  Abdomen Scar  Groin WDL  Scrotum/Coccyx/Buttocks WDL  (R) Leg WDL  (L) Leg WDL  (R) Heel/Foot/Toe WDL  (L) Heel/Foot/Toe WDL          Devices In Places Blood Pressure Cuff and Pulse Ox      Interventions In Place Pressure Redistribution Mattress    Possible Skin Injury No    Pictures Uploaded Into Epic N/A  Wound Consult Placed N/A  RN Wound Prevention Protocol Ordered No

## 2022-12-02 PROCEDURE — 700111 HCHG RX REV CODE 636 W/ 250 OVERRIDE (IP): Performed by: HOSPITALIST

## 2022-12-02 PROCEDURE — 770001 HCHG ROOM/CARE - MED/SURG/GYN PRIV*

## 2022-12-02 PROCEDURE — A9270 NON-COVERED ITEM OR SERVICE: HCPCS | Performed by: HOSPITALIST

## 2022-12-02 PROCEDURE — 700102 HCHG RX REV CODE 250 W/ 637 OVERRIDE(OP): Performed by: HOSPITALIST

## 2022-12-02 RX ADMIN — DEXAMETHASONE SODIUM PHOSPHATE 4 MG: 4 INJECTION, SOLUTION INTRA-ARTICULAR; INTRALESIONAL; INTRAMUSCULAR; INTRAVENOUS; SOFT TISSUE at 00:03

## 2022-12-02 RX ADMIN — ACETAMINOPHEN 650 MG: 325 TABLET, FILM COATED ORAL at 23:39

## 2022-12-02 RX ADMIN — ACETAMINOPHEN 650 MG: 325 TABLET, FILM COATED ORAL at 11:13

## 2022-12-02 RX ADMIN — DEXAMETHASONE SODIUM PHOSPHATE 4 MG: 4 INJECTION, SOLUTION INTRA-ARTICULAR; INTRALESIONAL; INTRAMUSCULAR; INTRAVENOUS; SOFT TISSUE at 11:12

## 2022-12-02 RX ADMIN — DOCUSATE SODIUM 50 MG AND SENNOSIDES 8.6 MG 2 TABLET: 8.6; 5 TABLET, FILM COATED ORAL at 17:54

## 2022-12-02 RX ADMIN — LOSARTAN POTASSIUM 50 MG: 50 TABLET, FILM COATED ORAL at 17:55

## 2022-12-02 RX ADMIN — DEXAMETHASONE SODIUM PHOSPHATE 4 MG: 4 INJECTION, SOLUTION INTRA-ARTICULAR; INTRALESIONAL; INTRAMUSCULAR; INTRAVENOUS; SOFT TISSUE at 23:39

## 2022-12-02 RX ADMIN — DEXAMETHASONE SODIUM PHOSPHATE 4 MG: 4 INJECTION, SOLUTION INTRA-ARTICULAR; INTRALESIONAL; INTRAMUSCULAR; INTRAVENOUS; SOFT TISSUE at 04:39

## 2022-12-02 RX ADMIN — DEXAMETHASONE SODIUM PHOSPHATE 4 MG: 4 INJECTION, SOLUTION INTRA-ARTICULAR; INTRALESIONAL; INTRAMUSCULAR; INTRAVENOUS; SOFT TISSUE at 17:54

## 2022-12-02 RX ADMIN — LEVETIRACETAM 500 MG: 500 TABLET, FILM COATED ORAL at 04:39

## 2022-12-02 RX ADMIN — LEVETIRACETAM 500 MG: 500 TABLET, FILM COATED ORAL at 17:54

## 2022-12-02 ASSESSMENT — PAIN DESCRIPTION - PAIN TYPE
TYPE: ACUTE PAIN

## 2022-12-02 NOTE — PROGRESS NOTES
Neurosurgery Progress Note    Subjective:  No acute events over night      Exam:  A/Ox4  CHAVARRIA, no focal weakness  +growth in the posterior left occipital region        BP  Min: 119/72  Max: 137/76  Pulse  Av.3  Min: 62  Max: 95  Resp  Av  Min: 14  Max: 18  Temp  Av.7 °C (98.1 °F)  Min: 35.9 °C (96.6 °F)  Max: 37.3 °C (99.1 °F)  SpO2  Av.2 %  Min: 92 %  Max: 97 %    No data recorded    Recent Labs     22  1716 22  0236   WBC 13.3* 12.7*   RBC 5.01 5.10   HEMOGLOBIN 15.1 15.1   HEMATOCRIT 44.7 45.1   MCV 89.2 88.4   MCH 30.1 29.6   MCHC 33.8 33.5*   RDW 14.6* 46.1   PLATELETCT 218 217   MPV 8.9 9.1     Recent Labs     22  1716 22  0236   SODIUM 135* 138   POTASSIUM 4.1 4.3   CHLORIDE 104 101   CO2 24 25   GLUCOSE 126* 156*   BUN 19 17   CREATININE 0.7 0.74   CALCIUM 8.9 9.6     Recent Labs     22  1716   INR 0.97           Intake/Output                         22 0700 - 22 0659 22 07 - 22 0659     [id number] 9344-8789 Total 8439-4502 3069-6872 Total                 Intake    P.O.  360  -- 360  --  -- --    P.O. 360 -- 360 -- -- --    Total Intake 360 -- 360 -- -- --       Output    Urine  --  -- --  --  -- --    Number of Times Voided -- 1 x 1 x -- -- --    Total Output -- -- -- -- -- --       Net I/O     360 -- 360 -- -- --              Intake/Output Summary (Last 24 hours) at 2022 0901  Last data filed at 2022 1524  Gross per 24 hour   Intake 360 ml   Output --   Net 360 ml             levETIRAcetam  500 mg BID    dexamethasone  4 mg Q6HRS    losartan  50 mg BID    senna-docusate  2 Tablet BID    And    polyethylene glycol/lytes  1 Packet QDAY PRN    And    magnesium hydroxide  30 mL QDAY PRN    And    bisacodyl  10 mg QDAY PRN    Respiratory Therapy Consult   Continuous RT    acetaminophen  650 mg Q6HRS PRN    ondansetron  4 mg Q4HRS PRN    ondansetron  4 mg Q4HRS PRN    promethazine  12.5-25 mg Q4HRS PRN    promethazine  12.5-25 mg  Q4HRS PRN    prochlorperazine  5-10 mg Q4HRS PRN    hydrALAZINE  10 mg Q4HRS PRN       Assessment and Plan:  Hospital day #2  POD #na  Prophylactic anticoagulation: ambulating    Keppra 500mg BID  On steroid  NPO on Sundat MN for embolization/diagnostic on Monday  OR on Wednesday for crani.  MRI stealth completed

## 2022-12-02 NOTE — DISCHARGE PLANNING
Met with pt who said he is a  and does physical work. He lives with brother. He does not use any DMEs. He does not have a PCP.He is independent with ADLs and IADLs. LVM for  [phone] for PCP search.    Care Transition Team Assessment    Information Source  Orientation Level: Oriented X4  Information Given By: Patient  Who is responsible for making decisions for patient? : Patient    Readmission Evaluation  Is this a readmission?: No    Elopement Risk  Legal Hold: No  Ambulatory or Self Mobile in Wheelchair: Yes  Disoriented: No  Psychiatric Symptoms: None  History of Wandering: No  Elopement this Admit: No  Vocalizing Wanting to Leave: No  Displays Behaviors, Body Language Wanting to Leave: No-Not at Risk for Elopement  Elopement Risk: Not at Risk for Elopement    Interdisciplinary Discharge Planning  Does Admitting Nurse Feel This Could be a Complex Discharge?: No  Primary Care Physician: none  Lives with - Patient's Self Care Capacity: Significant Other  Patient or legal guardian wants to designate a caregiver: No  Support Systems: Family Member(s)  Do You Take your Prescribed Medications Regularly: Yes  Able to Return to Previous ADL's: Yes  Mobility Issues: No  Prior Services: None, Home-Independent  Patient Prefers to be Discharged to:: Home  Assistance Needed: No  Durable Medical Equipment: Not Applicable    Discharge Preparedness  What is your plan after discharge?: Home with help  What are your discharge supports?: Sibling  Prior Functional Level: Ambulatory, Drives Self, Independent with Activities of Daily Living, Independent with Medication Management  Difficulity with ADLs: None    Functional Assesment  Prior Functional Level: Ambulatory, Drives Self, Independent with Activities of Daily Living, Independent with Medication Management    Finances  Financial Barriers to Discharge: No  Prescription Coverage: Yes    Vision / Hearing Impairment  Vision Impairment : No  Hearing Impairment :  No    Values / Beliefs / Concerns  Values / Beliefs Concerns : No    Advance Directive  Advance Directive?: None    Domestic Abuse  Have you ever been the victim of abuse or violence?: No  Physical Abuse or Sexual Abuse: No  Verbal Abuse or Emotional Abuse: No  Possible Abuse/Neglect Reported to:: Not Applicable         Discharge Risks or Barriers  Discharge risks or barriers?: No  Patient risk factors: Other (comment)    Anticipated Discharge Information  Discharge Disposition: Discharged to home/self care (01)

## 2022-12-02 NOTE — CARE PLAN
The patient is Watcher - Medium risk of patient condition declining or worsening    Shift Goals  Clinical Goals: manage pain, safe movement  Patient Goals: rest  Family Goals: n/a    Progress made toward(s) clinical / shift goals:  Patient was admitted today to the neuro unit. Patient is aox4. Patient has no headache at this time but has complaints of a headache for 2 months. Patient is standby assist and uses the bathroom frequently.     Patient is not progressing towards the following goals:      Problem: Knowledge Deficit - Standard  Goal: Patient and family/care givers will demonstrate understanding of plan of care, disease process/condition, diagnostic tests and medications  Outcome: Not Met     Problem: Pain - Standard  Goal: Alleviation of pain or a reduction in pain to the patient’s comfort goal  Outcome: Not Met     Problem: Fall Risk  Goal: Patient will remain free from falls  Outcome: Not Met     Problem: Neuro Status  Goal: Neuro status will remain stable or improve  Outcome: Not Met     Problem: Self Care  Goal: Patient will have the ability to perform ADLs independently or with assistance (bathe, groom, dress, toilet and feed)  Outcome: Not Met     Problem: Risk for Aspiration  Goal: Patient's risk for aspiration will be absent or decrease  Outcome: Not Met     Problem: Urinary Elimination  Goal: Establish and maintain regular urinary output  Outcome: Not Met     Problem: Bowel Elimination  Goal: Establish and maintain regular bowel function  Outcome: Not Met     Problem: Respiratory  Goal: Patient will achieve/maintain optimum respiratory ventilation and gas exchange  Outcome: Not Met     Problem: Mobility  Goal: Patient's capacity to carry out activities will improve  Outcome: Not Met

## 2022-12-02 NOTE — CARE PLAN
The patient is Stable - Low risk of patient condition declining or worsening    Shift Goals  Clinical Goals: Safety/Maintain Neuro Status  Patient Goals: Rest  Family Goals: NURA    Progress made toward(s) clinical / shift goals: Assume care of patient at 1915. Patient is A&Ox4. Q4hr neuro checks are being completed. Fall/Seizure/Aspiration precautions are in place. Bed is low and locked, bed alarm is on, call light is within reach, hourly rounding continues.     Problem: Fall Risk  Goal: Patient will remain free from falls  Outcome: Progressing  Notes: Patient has treaded slippers, bed is low and locked. No fall risk per Misael. However, given patient's diagnosis, patient should not get up without assistance.      Problem: Neuro Status  Goal: Neuro status will remain stable or improve  Outcome: Progressing    Patient is not progressing towards the following goals:N/A

## 2022-12-02 NOTE — CARE PLAN
The patient is Stable - Low risk of patient condition declining or worsening    Shift Goals  Clinical Goals: safety, no pain, stable neuro  Patient Goals: rest  Family Goals: n/a    Progress made toward(s) clinical / shift goals:  Patient has no deficits from the mass on his brain at this time. Patient doesn't complain of headache often. Patient has all sensation and has no numbness or balance issues. Patient follows commands and has 5/5 strengths in upper and lower extremities. Patient has a good appetite and has no further questions on procedures or plan for him at this time.       Problem: Knowledge Deficit - Standard  Goal: Patient and family/care givers will demonstrate understanding of plan of care, disease process/condition, diagnostic tests and medications  Outcome: Progressing     Problem: Pain - Standard  Goal: Alleviation of pain or a reduction in pain to the patient’s comfort goal  Outcome: Progressing     Problem: Fall Risk  Goal: Patient will remain free from falls  Outcome: Progressing     Problem: Neuro Status  Goal: Neuro status will remain stable or improve  Outcome: Progressing     Problem: Self Care  Goal: Patient will have the ability to perform ADLs independently or with assistance (bathe, groom, dress, toilet and feed)  Outcome: Progressing     Problem: Risk for Aspiration  Goal: Patient's risk for aspiration will be absent or decrease  Outcome: Progressing       Patient is not progressing towards the following goals:

## 2022-12-03 LAB
ALBUMIN SERPL BCP-MCNC: 3.8 G/DL (ref 3.2–4.9)
ALBUMIN/GLOB SERPL: 1.7 G/DL
ALP SERPL-CCNC: 96 U/L (ref 30–99)
ALT SERPL-CCNC: 15 U/L (ref 2–50)
ANION GAP SERPL CALC-SCNC: 11 MMOL/L (ref 7–16)
AST SERPL-CCNC: 9 U/L (ref 12–45)
BILIRUB SERPL-MCNC: 0.8 MG/DL (ref 0.1–1.5)
BUN SERPL-MCNC: 24 MG/DL (ref 8–22)
CALCIUM SERPL-MCNC: 9.3 MG/DL (ref 8.5–10.5)
CHLORIDE SERPL-SCNC: 101 MMOL/L (ref 96–112)
CO2 SERPL-SCNC: 25 MMOL/L (ref 20–33)
CREAT SERPL-MCNC: 0.61 MG/DL (ref 0.5–1.4)
GFR SERPLBLD CREATININE-BSD FMLA CKD-EPI: 112 ML/MIN/1.73 M 2
GLOBULIN SER CALC-MCNC: 2.2 G/DL (ref 1.9–3.5)
GLUCOSE SERPL-MCNC: 167 MG/DL (ref 65–99)
POTASSIUM SERPL-SCNC: 4.4 MMOL/L (ref 3.6–5.5)
PROT SERPL-MCNC: 6 G/DL (ref 6–8.2)
SODIUM SERPL-SCNC: 137 MMOL/L (ref 135–145)

## 2022-12-03 PROCEDURE — 80053 COMPREHEN METABOLIC PANEL: CPT

## 2022-12-03 PROCEDURE — 700111 HCHG RX REV CODE 636 W/ 250 OVERRIDE (IP): Performed by: HOSPITALIST

## 2022-12-03 PROCEDURE — 700102 HCHG RX REV CODE 250 W/ 637 OVERRIDE(OP): Performed by: HOSPITALIST

## 2022-12-03 PROCEDURE — 36415 COLL VENOUS BLD VENIPUNCTURE: CPT

## 2022-12-03 PROCEDURE — A9270 NON-COVERED ITEM OR SERVICE: HCPCS | Performed by: HOSPITALIST

## 2022-12-03 PROCEDURE — 770001 HCHG ROOM/CARE - MED/SURG/GYN PRIV*

## 2022-12-03 RX ADMIN — DEXAMETHASONE SODIUM PHOSPHATE 4 MG: 4 INJECTION, SOLUTION INTRA-ARTICULAR; INTRALESIONAL; INTRAMUSCULAR; INTRAVENOUS; SOFT TISSUE at 05:42

## 2022-12-03 RX ADMIN — DEXAMETHASONE SODIUM PHOSPHATE 4 MG: 4 INJECTION, SOLUTION INTRA-ARTICULAR; INTRALESIONAL; INTRAMUSCULAR; INTRAVENOUS; SOFT TISSUE at 17:39

## 2022-12-03 RX ADMIN — DEXAMETHASONE SODIUM PHOSPHATE 4 MG: 4 INJECTION, SOLUTION INTRA-ARTICULAR; INTRALESIONAL; INTRAMUSCULAR; INTRAVENOUS; SOFT TISSUE at 23:43

## 2022-12-03 RX ADMIN — ACETAMINOPHEN 650 MG: 325 TABLET, FILM COATED ORAL at 23:43

## 2022-12-03 RX ADMIN — LOSARTAN POTASSIUM 50 MG: 50 TABLET, FILM COATED ORAL at 17:38

## 2022-12-03 RX ADMIN — LOSARTAN POTASSIUM 50 MG: 50 TABLET, FILM COATED ORAL at 05:43

## 2022-12-03 RX ADMIN — LEVETIRACETAM 500 MG: 500 TABLET, FILM COATED ORAL at 17:39

## 2022-12-03 RX ADMIN — DOCUSATE SODIUM 50 MG AND SENNOSIDES 8.6 MG 2 TABLET: 8.6; 5 TABLET, FILM COATED ORAL at 05:43

## 2022-12-03 RX ADMIN — LEVETIRACETAM 500 MG: 500 TABLET, FILM COATED ORAL at 05:44

## 2022-12-03 RX ADMIN — DEXAMETHASONE SODIUM PHOSPHATE 4 MG: 4 INJECTION, SOLUTION INTRA-ARTICULAR; INTRALESIONAL; INTRAMUSCULAR; INTRAVENOUS; SOFT TISSUE at 12:23

## 2022-12-03 RX ADMIN — ACETAMINOPHEN 650 MG: 325 TABLET, FILM COATED ORAL at 17:38

## 2022-12-03 ASSESSMENT — PAIN DESCRIPTION - PAIN TYPE
TYPE: ACUTE PAIN

## 2022-12-03 NOTE — PROGRESS NOTES
Neurosurgery Progress Note    Subjective:  No acute events over night  Mild HA    Exam:    A&O x3, GCS 15  PERRL, EOMI  Face symm, tongue midline  CHAVARRIA with FS, no drift           BP  Min: 119/67  Max: 130/77  Pulse  Av.6  Min: 60  Max: 72  Resp  Av.8  Min: 17  Max: 18  Temp  Av.9 °C (98.4 °F)  Min: 36.7 °C (98.1 °F)  Max: 37.2 °C (99 °F)  Monitored Temp 2  Av °C (98.6 °F)  Min: 37 °C (98.6 °F)  Max: 37 °C (98.6 °F)  SpO2  Av.6 %  Min: 94 %  Max: 97 %    No data recorded    Recent Labs     226 22  0236   WBC 13.3* 12.7*   RBC 5.01 5.10   HEMOGLOBIN 15.1 15.1   HEMATOCRIT 44.7 45.1   MCV 89.2 88.4   MCH 30.1 29.6   MCHC 33.8 33.5*   RDW 14.6* 46.1   PLATELETCT 218 217   MPV 8.9 9.1       Recent Labs     22  1716 22  0236 22  0702   SODIUM 135* 138 137   POTASSIUM 4.1 4.3 4.4   CHLORIDE 104 101 101   CO2 24 25 25   GLUCOSE 126* 156* 167*   BUN 19 17 24*   CREATININE 0.7 0.74 0.61   CALCIUM 8.9 9.6 9.3       Recent Labs     22  1716   INR 0.97             Intake/Output                         22 07 - 22 0659 22 07 - 22 0659     [id number] 4353-9088 Total 5272-5068 7044-9991 Total                 Intake    P.O.  480  -- 480  400  -- 400    P.O. 480 -- 480 400 -- 400    Total Intake 480 -- 480 400 -- 400       Output    Urine  --  -- --  --  -- --    Number of Times Voided -- -- -- 1 x -- 1 x    Total Output -- -- -- -- -- --       Net I/O     480 -- 480 400 -- 400              Intake/Output Summary (Last 24 hours) at 12/3/2022 1123  Last data filed at 12/3/2022 0900  Gross per 24 hour   Intake 640 ml   Output --   Net 640 ml               levETIRAcetam  500 mg BID    dexamethasone  4 mg Q6HRS    losartan  50 mg BID    senna-docusate  2 Tablet BID    And    polyethylene glycol/lytes  1 Packet QDAY PRN    And    magnesium hydroxide  30 mL QDAY PRN    And    bisacodyl  10 mg QDAY PRN    Respiratory Therapy Consult   Continuous RT     acetaminophen  650 mg Q6HRS PRN    ondansetron  4 mg Q4HRS PRN    ondansetron  4 mg Q4HRS PRN    promethazine  12.5-25 mg Q4HRS PRN    promethazine  12.5-25 mg Q4HRS PRN    prochlorperazine  5-10 mg Q4HRS PRN    hydrALAZINE  10 mg Q4HRS PRN       Assessment and Plan:  Hospital day #3 hypervascular heterogenous enhancing mass based off of the skull on the dura causing significant either invasion or expansion of the skull and encasement or invasion into the superior sagittal sinus distal to the torcula there is also hypervascularity of the cortical surface in the left occipital lobe.  POD #na  Prophylactic anticoagulation: ambulating    Keppra 500mg BID  On steroids  NPO on Sundat MN for embolization/diagnostic on Monday  OR on Wednesday for crani.  MRI stealth completed

## 2022-12-03 NOTE — CARE PLAN
The patient is Stable - Low risk of patient condition declining or worsening    Shift Goals  Clinical Goals: pain management, neuro exam  Patient Goals: rest  Family Goals: elizabeth    Progress made toward(s) clinical / shift goals:    Problem: Pain - Standard  Goal: Alleviation of pain or a reduction in pain to the patient’s comfort goal  Outcome: Progressing  Addressing with appropriate prns see MAR     Problem: Neuro Status  Goal: Neuro status will remain stable or improve  Outcome: Progressing  Remains unchanged will continue to monitor       Patient is not progressing towards the following goals:

## 2022-12-04 LAB
ALBUMIN SERPL BCP-MCNC: 3.6 G/DL (ref 3.2–4.9)
ALBUMIN/GLOB SERPL: 1.7 G/DL
ALP SERPL-CCNC: 91 U/L (ref 30–99)
ALT SERPL-CCNC: 13 U/L (ref 2–50)
ANION GAP SERPL CALC-SCNC: 9 MMOL/L (ref 7–16)
AST SERPL-CCNC: 7 U/L (ref 12–45)
BILIRUB SERPL-MCNC: 0.5 MG/DL (ref 0.1–1.5)
BUN SERPL-MCNC: 23 MG/DL (ref 8–22)
CALCIUM SERPL-MCNC: 9 MG/DL (ref 8.5–10.5)
CHLORIDE SERPL-SCNC: 101 MMOL/L (ref 96–112)
CO2 SERPL-SCNC: 25 MMOL/L (ref 20–33)
CREAT SERPL-MCNC: 0.67 MG/DL (ref 0.5–1.4)
GFR SERPLBLD CREATININE-BSD FMLA CKD-EPI: 109 ML/MIN/1.73 M 2
GLOBULIN SER CALC-MCNC: 2.1 G/DL (ref 1.9–3.5)
GLUCOSE SERPL-MCNC: 180 MG/DL (ref 65–99)
POTASSIUM SERPL-SCNC: 4.6 MMOL/L (ref 3.6–5.5)
PROT SERPL-MCNC: 5.7 G/DL (ref 6–8.2)
SODIUM SERPL-SCNC: 135 MMOL/L (ref 135–145)

## 2022-12-04 PROCEDURE — A9270 NON-COVERED ITEM OR SERVICE: HCPCS | Performed by: HOSPITALIST

## 2022-12-04 PROCEDURE — 700111 HCHG RX REV CODE 636 W/ 250 OVERRIDE (IP): Performed by: HOSPITALIST

## 2022-12-04 PROCEDURE — 770001 HCHG ROOM/CARE - MED/SURG/GYN PRIV*

## 2022-12-04 PROCEDURE — 80053 COMPREHEN METABOLIC PANEL: CPT

## 2022-12-04 PROCEDURE — 700102 HCHG RX REV CODE 250 W/ 637 OVERRIDE(OP): Performed by: HOSPITALIST

## 2022-12-04 RX ADMIN — LEVETIRACETAM 500 MG: 500 TABLET, FILM COATED ORAL at 17:27

## 2022-12-04 RX ADMIN — DOCUSATE SODIUM 50 MG AND SENNOSIDES 8.6 MG 2 TABLET: 8.6; 5 TABLET, FILM COATED ORAL at 04:30

## 2022-12-04 RX ADMIN — DEXAMETHASONE SODIUM PHOSPHATE 4 MG: 4 INJECTION, SOLUTION INTRA-ARTICULAR; INTRALESIONAL; INTRAMUSCULAR; INTRAVENOUS; SOFT TISSUE at 12:05

## 2022-12-04 RX ADMIN — DEXAMETHASONE SODIUM PHOSPHATE 4 MG: 4 INJECTION, SOLUTION INTRA-ARTICULAR; INTRALESIONAL; INTRAMUSCULAR; INTRAVENOUS; SOFT TISSUE at 04:30

## 2022-12-04 RX ADMIN — DOCUSATE SODIUM 50 MG AND SENNOSIDES 8.6 MG 2 TABLET: 8.6; 5 TABLET, FILM COATED ORAL at 17:27

## 2022-12-04 RX ADMIN — LOSARTAN POTASSIUM 50 MG: 50 TABLET, FILM COATED ORAL at 17:26

## 2022-12-04 RX ADMIN — LEVETIRACETAM 500 MG: 500 TABLET, FILM COATED ORAL at 04:30

## 2022-12-04 RX ADMIN — LOSARTAN POTASSIUM 50 MG: 50 TABLET, FILM COATED ORAL at 04:31

## 2022-12-04 RX ADMIN — DEXAMETHASONE SODIUM PHOSPHATE 4 MG: 4 INJECTION, SOLUTION INTRA-ARTICULAR; INTRALESIONAL; INTRAMUSCULAR; INTRAVENOUS; SOFT TISSUE at 17:28

## 2022-12-04 RX ADMIN — DEXAMETHASONE SODIUM PHOSPHATE 4 MG: 4 INJECTION, SOLUTION INTRA-ARTICULAR; INTRALESIONAL; INTRAMUSCULAR; INTRAVENOUS; SOFT TISSUE at 23:30

## 2022-12-04 ASSESSMENT — PAIN DESCRIPTION - PAIN TYPE: TYPE: ACUTE PAIN

## 2022-12-04 NOTE — CARE PLAN
The patient is Stable - Low risk of patient condition declining or worsening    Shift Goals  Clinical Goals: stable neuro exams  Patient Goals: rest  Family Goals: elizabeth    Progress made toward(s) clinical / shift goals:  neuro exam stable. Pt has had time to rest     Patient is not progressing towards the following goals:

## 2022-12-04 NOTE — CARE PLAN
The patient is Stable - Low risk of patient condition declining or worsening    Shift Goals  Clinical Goals: stable neuro  Patient Goals: rest  Family Goals: NURA    Progress made toward(s) clinical / shift goals:  Pt. Is A&O x 4. Follows commands and responds appropriately. Will continue to round hourly and encourage the Pt. To ask questions and voice feelings.           Patient is not progressing towards the following goals:n/a

## 2022-12-04 NOTE — CARE PLAN
The patient is Stable - Low risk of patient condition declining or worsening    Shift Goals  Clinical Goals: Stable Neuro Status; Safety  Patient Goals: Rest  Family Goals: NURA    Progress made toward(s) clinical / shift goals:  Assumed care of pt at 1845. POC discussed with pt. Pt A/Ox 4 and verbalized understanding. Pt on fall/seizure/aspiration precautions. Pt on Q4hr neuro checks. Pt educated to use the call light for assistance. Call light within reach. Pt rounded on frequently throughout the shift.  Pt calls appropriately.     Problem: Pain - Standard  Goal: Alleviation of pain or a reduction in pain to the patient’s comfort goal  Outcome: Progressing  Note: Pt's pain assessed throughout shift. Patient offered pharmacological and non-pharmacological interventions.      Problem: Fall Risk  Goal: Patient will remain free from falls  Outcome: Progressing  Note: Fall precautions in place. Bed locked and in lowest position, bed alarm in place, treaded socks used when ambulated, call light within reach. Pt educated to call for assistance. Pt rounded on frequently throughout shift.       Problem: Neuro Status  Goal: Neuro status will remain stable or improve  Outcome: Progressing  Note: Pt's neuro status assessed throughout the shift with frequent neuro checks. Pt has maintained a stable neuro status.         Patient is not progressing towards the following goals:

## 2022-12-04 NOTE — PROGRESS NOTES
Neurosurgery Progress Note    Subjective:  No acute events over night  No complaints     Exam:    A&O x3, GCS 15  PERRL, EOMI  Face symm, tongue midline  CHAVARRIA with FS, no drift           BP  Min: 129/72  Max: 137/79  Pulse  Av.8  Min: 56  Max: 71  Resp  Av.3  Min: 17  Max: 18  Temp  Av °C (98.6 °F)  Min: 36.9 °C (98.4 °F)  Max: 37.1 °C (98.8 °F)  Monitored Temp 2  Av.9 °C (98.4 °F)  Min: 36.9 °C (98.4 °F)  Max: 36.9 °C (98.4 °F)  SpO2  Av.3 %  Min: 91 %  Max: 96 %    No data recorded          Recent Labs     22  0702 22  0345   SODIUM 137 135   POTASSIUM 4.4 4.6   CHLORIDE 101 101   CO2 25 25   GLUCOSE 167* 180*   BUN 24* 23*   CREATININE 0.61 0.67   CALCIUM 9.3 9.0                   Intake/Output                         22 0700 - 22 0659 22 0700 - 22 0659     [id number] 3637-2554 Total 8486-2152 5223-4920 Total                 Intake    P.O.  800  -- 800  --  -- --    P.O. 800 -- 800 -- -- --    Total Intake 800 -- 800 -- -- --       Output    Urine  --  -- --  --  -- --    Number of Times Voided 3 x 2 x 5 x -- -- --    Stool  --  -- --  --  -- --    Number of Times Stooled 1 x -- 1 x -- -- --    Total Output -- -- -- -- -- --       Net I/O     800 -- 800 -- -- --              Intake/Output Summary (Last 24 hours) at 2022 1035  Last data filed at 12/3/2022 1500  Gross per 24 hour   Intake 400 ml   Output --   Net 400 ml               levETIRAcetam  500 mg BID    dexamethasone  4 mg Q6HRS    losartan  50 mg BID    senna-docusate  2 Tablet BID    And    polyethylene glycol/lytes  1 Packet QDAY PRN    And    magnesium hydroxide  30 mL QDAY PRN    And    bisacodyl  10 mg QDAY PRN    Respiratory Therapy Consult   Continuous RT    acetaminophen  650 mg Q6HRS PRN    ondansetron  4 mg Q4HRS PRN    ondansetron  4 mg Q4HRS PRN    promethazine  12.5-25 mg Q4HRS PRN    promethazine  12.5-25 mg Q4HRS PRN    prochlorperazine  5-10 mg Q4HRS PRN    hydrALAZINE  10 mg  Q4HRS PRN       Assessment and Plan:  Hospital day  4 hypervascular heterogenous enhancing mass based off of the skull on the dura causing significant either invasion or expansion of the skull and encasement or invasion into the superior sagittal sinus distal to the torcula there is also hypervascularity of the cortical surface in the left occipital lobe.  POD #na  Prophylactic anticoagulation: ambulating    Keppra 500mg BID  On steroids  NPO on Sundat MN for embolization/diagnostic on Monday  OR on Wednesday for crani.  MRI stealth completed

## 2022-12-05 ENCOUNTER — ANESTHESIA EVENT (OUTPATIENT)
Dept: RADIOLOGY | Facility: MEDICAL CENTER | Age: 57
DRG: 025 | End: 2022-12-05
Payer: COMMERCIAL

## 2022-12-05 ENCOUNTER — ANESTHESIA (OUTPATIENT)
Dept: RADIOLOGY | Facility: MEDICAL CENTER | Age: 57
DRG: 025 | End: 2022-12-05
Payer: COMMERCIAL

## 2022-12-05 ENCOUNTER — PATIENT OUTREACH (OUTPATIENT)
Dept: SCHEDULING | Facility: IMAGING CENTER | Age: 57
End: 2022-12-05

## 2022-12-05 ENCOUNTER — HOSPITAL ENCOUNTER (OUTPATIENT)
Dept: RADIOLOGY | Facility: MEDICAL CENTER | Age: 57
DRG: 025 | End: 2022-12-05
Attending: EMERGENCY MEDICINE
Payer: COMMERCIAL

## 2022-12-05 PROBLEM — I10 HYPERTENSION: Chronic | Status: ACTIVE | Noted: 2022-11-30

## 2022-12-05 LAB
ACT BLD: 203 SEC (ref 74–137)
ALBUMIN SERPL BCP-MCNC: 3.7 G/DL (ref 3.2–4.9)
ALBUMIN/GLOB SERPL: 1.8 G/DL
ALP SERPL-CCNC: 89 U/L (ref 30–99)
ALT SERPL-CCNC: 14 U/L (ref 2–50)
ANION GAP SERPL CALC-SCNC: 10 MMOL/L (ref 7–16)
AST SERPL-CCNC: 9 U/L (ref 12–45)
BILIRUB SERPL-MCNC: 1 MG/DL (ref 0.1–1.5)
BUN SERPL-MCNC: 22 MG/DL (ref 8–22)
CALCIUM SERPL-MCNC: 9 MG/DL (ref 8.5–10.5)
CHLORIDE SERPL-SCNC: 98 MMOL/L (ref 96–112)
CO2 SERPL-SCNC: 25 MMOL/L (ref 20–33)
CREAT SERPL-MCNC: 0.65 MG/DL (ref 0.5–1.4)
GFR SERPLBLD CREATININE-BSD FMLA CKD-EPI: 110 ML/MIN/1.73 M 2
GLOBULIN SER CALC-MCNC: 2.1 G/DL (ref 1.9–3.5)
GLUCOSE SERPL-MCNC: 166 MG/DL (ref 65–99)
POTASSIUM SERPL-SCNC: 4.2 MMOL/L (ref 3.6–5.5)
PROT SERPL-MCNC: 5.8 G/DL (ref 6–8.2)
SODIUM SERPL-SCNC: 133 MMOL/L (ref 135–145)

## 2022-12-05 PROCEDURE — 700102 HCHG RX REV CODE 250 W/ 637 OVERRIDE(OP): Performed by: HOSPITALIST

## 2022-12-05 PROCEDURE — 36226 PLACE CATH VERTEBRAL ART: CPT

## 2022-12-05 PROCEDURE — 700111 HCHG RX REV CODE 636 W/ 250 OVERRIDE (IP): Performed by: RADIOLOGY

## 2022-12-05 PROCEDURE — 36620 INSERTION CATHETER ARTERY: CPT | Performed by: EMERGENCY MEDICINE

## 2022-12-05 PROCEDURE — 03LM3DZ OCCLUSION OF RIGHT EXTERNAL CAROTID ARTERY WITH INTRALUMINAL DEVICE, PERCUTANEOUS APPROACH: ICD-10-PCS | Performed by: RADIOLOGY

## 2022-12-05 PROCEDURE — 700111 HCHG RX REV CODE 636 W/ 250 OVERRIDE (IP): Performed by: HOSPITALIST

## 2022-12-05 PROCEDURE — B41F1ZZ FLUOROSCOPY OF RIGHT LOWER EXTREMITY ARTERIES USING LOW OSMOLAR CONTRAST: ICD-10-PCS | Performed by: RADIOLOGY

## 2022-12-05 PROCEDURE — 160035 HCHG PACU - 1ST 60 MINS PHASE I

## 2022-12-05 PROCEDURE — 770022 HCHG ROOM/CARE - ICU (200)

## 2022-12-05 PROCEDURE — 160002 HCHG RECOVERY MINUTES (STAT)

## 2022-12-05 PROCEDURE — 36227 PLACE CATH XTRNL CAROTID: CPT

## 2022-12-05 PROCEDURE — 03LG3DZ OCCLUSION OF INTRACRANIAL ARTERY WITH INTRALUMINAL DEVICE, PERCUTANEOUS APPROACH: ICD-10-PCS | Performed by: RADIOLOGY

## 2022-12-05 PROCEDURE — 700111 HCHG RX REV CODE 636 W/ 250 OVERRIDE (IP)

## 2022-12-05 PROCEDURE — 99253 IP/OBS CNSLTJ NEW/EST LOW 45: CPT | Mod: FS | Performed by: NURSE PRACTITIONER

## 2022-12-05 PROCEDURE — 160036 HCHG PACU - EA ADDL 30 MINS PHASE I

## 2022-12-05 PROCEDURE — 80053 COMPREHEN METABOLIC PANEL: CPT

## 2022-12-05 PROCEDURE — 700111 HCHG RX REV CODE 636 W/ 250 OVERRIDE (IP): Performed by: EMERGENCY MEDICINE

## 2022-12-05 PROCEDURE — 36228 PLACE CATH INTRACRANIAL ART: CPT

## 2022-12-05 PROCEDURE — 01926 ANES IVNTL RAD ICR ICAR/AORT: CPT | Performed by: EMERGENCY MEDICINE

## 2022-12-05 PROCEDURE — 4410588 IR-EMBOLIZE-NEURO-INTRACRANIAL

## 2022-12-05 PROCEDURE — 36224 PLACE CATH CAROTD ART: CPT

## 2022-12-05 PROCEDURE — 700117 HCHG RX CONTRAST REV CODE 255: Performed by: EMERGENCY MEDICINE

## 2022-12-05 PROCEDURE — 85347 COAGULATION TIME ACTIVATED: CPT

## 2022-12-05 PROCEDURE — A9270 NON-COVERED ITEM OR SERVICE: HCPCS | Performed by: HOSPITALIST

## 2022-12-05 PROCEDURE — 700101 HCHG RX REV CODE 250: Performed by: EMERGENCY MEDICINE

## 2022-12-05 PROCEDURE — 700105 HCHG RX REV CODE 258: Performed by: EMERGENCY MEDICINE

## 2022-12-05 RX ORDER — HYDROMORPHONE HYDROCHLORIDE 1 MG/ML
0.4 INJECTION, SOLUTION INTRAMUSCULAR; INTRAVENOUS; SUBCUTANEOUS
Status: DISCONTINUED | OUTPATIENT
Start: 2022-12-05 | End: 2022-12-05 | Stop reason: HOSPADM

## 2022-12-05 RX ORDER — DEXAMETHASONE SODIUM PHOSPHATE 4 MG/ML
INJECTION, SOLUTION INTRA-ARTICULAR; INTRALESIONAL; INTRAMUSCULAR; INTRAVENOUS; SOFT TISSUE PRN
Status: DISCONTINUED | OUTPATIENT
Start: 2022-12-05 | End: 2022-12-05 | Stop reason: SURG

## 2022-12-05 RX ORDER — LABETALOL HYDROCHLORIDE 5 MG/ML
5 INJECTION, SOLUTION INTRAVENOUS
Status: CANCELLED | OUTPATIENT
Start: 2022-12-05

## 2022-12-05 RX ORDER — HEPARIN SODIUM 1000 [USP'U]/ML
6000 INJECTION, SOLUTION INTRAVENOUS; SUBCUTANEOUS ONCE
Status: COMPLETED | OUTPATIENT
Start: 2022-12-05 | End: 2022-12-05

## 2022-12-05 RX ORDER — HYDROMORPHONE HYDROCHLORIDE 1 MG/ML
0.2 INJECTION, SOLUTION INTRAMUSCULAR; INTRAVENOUS; SUBCUTANEOUS
Status: DISCONTINUED | OUTPATIENT
Start: 2022-12-05 | End: 2022-12-05 | Stop reason: HOSPADM

## 2022-12-05 RX ORDER — ONDANSETRON 2 MG/ML
4 INJECTION INTRAMUSCULAR; INTRAVENOUS
Status: DISCONTINUED | OUTPATIENT
Start: 2022-12-05 | End: 2022-12-05 | Stop reason: HOSPADM

## 2022-12-05 RX ORDER — HEPARIN SODIUM 1000 [USP'U]/ML
1000 INJECTION, SOLUTION INTRAVENOUS; SUBCUTANEOUS ONCE
Status: COMPLETED | OUTPATIENT
Start: 2022-12-05 | End: 2022-12-05

## 2022-12-05 RX ORDER — HYDROMORPHONE HYDROCHLORIDE 1 MG/ML
0.1 INJECTION, SOLUTION INTRAMUSCULAR; INTRAVENOUS; SUBCUTANEOUS
Status: CANCELLED | OUTPATIENT
Start: 2022-12-05

## 2022-12-05 RX ORDER — OXYCODONE HYDROCHLORIDE AND ACETAMINOPHEN 5; 325 MG/1; MG/1
2 TABLET ORAL
Status: CANCELLED | OUTPATIENT
Start: 2022-12-05

## 2022-12-05 RX ORDER — DIPHENHYDRAMINE HYDROCHLORIDE 50 MG/ML
12.5 INJECTION INTRAMUSCULAR; INTRAVENOUS
Status: CANCELLED | OUTPATIENT
Start: 2022-12-05

## 2022-12-05 RX ORDER — HYDROMORPHONE HYDROCHLORIDE 1 MG/ML
0.2 INJECTION, SOLUTION INTRAMUSCULAR; INTRAVENOUS; SUBCUTANEOUS
Status: CANCELLED | OUTPATIENT
Start: 2022-12-05

## 2022-12-05 RX ORDER — HYDRALAZINE HYDROCHLORIDE 20 MG/ML
5 INJECTION INTRAMUSCULAR; INTRAVENOUS
Status: DISCONTINUED | OUTPATIENT
Start: 2022-12-05 | End: 2022-12-05 | Stop reason: HOSPADM

## 2022-12-05 RX ORDER — HALOPERIDOL 5 MG/ML
1 INJECTION INTRAMUSCULAR
Status: DISCONTINUED | OUTPATIENT
Start: 2022-12-05 | End: 2022-12-05 | Stop reason: HOSPADM

## 2022-12-05 RX ORDER — CEFAZOLIN SODIUM 1 G/3ML
INJECTION, POWDER, FOR SOLUTION INTRAMUSCULAR; INTRAVENOUS PRN
Status: DISCONTINUED | OUTPATIENT
Start: 2022-12-05 | End: 2022-12-05 | Stop reason: SURG

## 2022-12-05 RX ORDER — SODIUM CHLORIDE, SODIUM GLUCONATE, SODIUM ACETATE, POTASSIUM CHLORIDE AND MAGNESIUM CHLORIDE 526; 502; 368; 37; 30 MG/100ML; MG/100ML; MG/100ML; MG/100ML; MG/100ML
INJECTION, SOLUTION INTRAVENOUS
Status: DISCONTINUED | OUTPATIENT
Start: 2022-12-05 | End: 2022-12-05 | Stop reason: SURG

## 2022-12-05 RX ORDER — HALOPERIDOL 5 MG/ML
1 INJECTION INTRAMUSCULAR
Status: CANCELLED | OUTPATIENT
Start: 2022-12-05

## 2022-12-05 RX ORDER — OXYCODONE HYDROCHLORIDE AND ACETAMINOPHEN 5; 325 MG/1; MG/1
1 TABLET ORAL
Status: DISCONTINUED | OUTPATIENT
Start: 2022-12-05 | End: 2022-12-05 | Stop reason: HOSPADM

## 2022-12-05 RX ORDER — HEPARIN SODIUM 1000 [USP'U]/ML
INJECTION, SOLUTION INTRAVENOUS; SUBCUTANEOUS
Status: COMPLETED
Start: 2022-12-05 | End: 2022-12-05

## 2022-12-05 RX ORDER — DIPHENHYDRAMINE HYDROCHLORIDE 50 MG/ML
12.5 INJECTION INTRAMUSCULAR; INTRAVENOUS
Status: DISCONTINUED | OUTPATIENT
Start: 2022-12-05 | End: 2022-12-05 | Stop reason: HOSPADM

## 2022-12-05 RX ORDER — OXYCODONE HYDROCHLORIDE AND ACETAMINOPHEN 5; 325 MG/1; MG/1
1 TABLET ORAL
Status: CANCELLED | OUTPATIENT
Start: 2022-12-05

## 2022-12-05 RX ORDER — HYDROMORPHONE HYDROCHLORIDE 1 MG/ML
0.4 INJECTION, SOLUTION INTRAMUSCULAR; INTRAVENOUS; SUBCUTANEOUS
Status: CANCELLED | OUTPATIENT
Start: 2022-12-05

## 2022-12-05 RX ORDER — ONDANSETRON 2 MG/ML
INJECTION INTRAMUSCULAR; INTRAVENOUS PRN
Status: DISCONTINUED | OUTPATIENT
Start: 2022-12-05 | End: 2022-12-05 | Stop reason: SURG

## 2022-12-05 RX ORDER — HYDROMORPHONE HYDROCHLORIDE 1 MG/ML
0.1 INJECTION, SOLUTION INTRAMUSCULAR; INTRAVENOUS; SUBCUTANEOUS
Status: DISCONTINUED | OUTPATIENT
Start: 2022-12-05 | End: 2022-12-05 | Stop reason: HOSPADM

## 2022-12-05 RX ORDER — SODIUM CHLORIDE, SODIUM LACTATE, POTASSIUM CHLORIDE, AND CALCIUM CHLORIDE .6; .31; .03; .02 G/100ML; G/100ML; G/100ML; G/100ML
500 INJECTION, SOLUTION INTRAVENOUS
Status: DISCONTINUED | OUTPATIENT
Start: 2022-12-05 | End: 2022-12-05 | Stop reason: HOSPADM

## 2022-12-05 RX ORDER — HYDRALAZINE HYDROCHLORIDE 20 MG/ML
5 INJECTION INTRAMUSCULAR; INTRAVENOUS
Status: CANCELLED | OUTPATIENT
Start: 2022-12-05

## 2022-12-05 RX ORDER — LABETALOL HYDROCHLORIDE 5 MG/ML
10-20 INJECTION, SOLUTION INTRAVENOUS EVERY 4 HOURS PRN
Status: DISCONTINUED | OUTPATIENT
Start: 2022-12-05 | End: 2022-12-07

## 2022-12-05 RX ORDER — OXYCODONE HYDROCHLORIDE AND ACETAMINOPHEN 5; 325 MG/1; MG/1
2 TABLET ORAL
Status: DISCONTINUED | OUTPATIENT
Start: 2022-12-05 | End: 2022-12-05 | Stop reason: HOSPADM

## 2022-12-05 RX ORDER — LABETALOL HYDROCHLORIDE 5 MG/ML
5 INJECTION, SOLUTION INTRAVENOUS
Status: DISCONTINUED | OUTPATIENT
Start: 2022-12-05 | End: 2022-12-05 | Stop reason: HOSPADM

## 2022-12-05 RX ORDER — MEPERIDINE HYDROCHLORIDE 25 MG/ML
6.25 INJECTION INTRAMUSCULAR; INTRAVENOUS; SUBCUTANEOUS
Status: CANCELLED | OUTPATIENT
Start: 2022-12-05

## 2022-12-05 RX ORDER — LIDOCAINE HYDROCHLORIDE 20 MG/ML
INJECTION, SOLUTION EPIDURAL; INFILTRATION; INTRACAUDAL; PERINEURAL PRN
Status: DISCONTINUED | OUTPATIENT
Start: 2022-12-05 | End: 2022-12-05 | Stop reason: SURG

## 2022-12-05 RX ORDER — SODIUM CHLORIDE, SODIUM LACTATE, POTASSIUM CHLORIDE, AND CALCIUM CHLORIDE .6; .31; .03; .02 G/100ML; G/100ML; G/100ML; G/100ML
500 INJECTION, SOLUTION INTRAVENOUS
Status: CANCELLED | OUTPATIENT
Start: 2022-12-05

## 2022-12-05 RX ORDER — ONDANSETRON 2 MG/ML
4 INJECTION INTRAMUSCULAR; INTRAVENOUS
Status: CANCELLED | OUTPATIENT
Start: 2022-12-05

## 2022-12-05 RX ORDER — MEPERIDINE HYDROCHLORIDE 25 MG/ML
6.25 INJECTION INTRAMUSCULAR; INTRAVENOUS; SUBCUTANEOUS
Status: DISCONTINUED | OUTPATIENT
Start: 2022-12-05 | End: 2022-12-05 | Stop reason: HOSPADM

## 2022-12-05 RX ORDER — MIDAZOLAM HYDROCHLORIDE 1 MG/ML
INJECTION INTRAMUSCULAR; INTRAVENOUS
Status: COMPLETED
Start: 2022-12-05 | End: 2022-12-05

## 2022-12-05 RX ADMIN — DEXAMETHASONE SODIUM PHOSPHATE 4 MG: 4 INJECTION, SOLUTION INTRA-ARTICULAR; INTRALESIONAL; INTRAMUSCULAR; INTRAVENOUS; SOFT TISSUE at 17:05

## 2022-12-05 RX ADMIN — SUGAMMADEX 200 MG: 100 INJECTION, SOLUTION INTRAVENOUS at 11:00

## 2022-12-05 RX ADMIN — FENTANYL CITRATE 100 MCG: 50 INJECTION, SOLUTION INTRAMUSCULAR; INTRAVENOUS at 08:33

## 2022-12-05 RX ADMIN — DEXAMETHASONE SODIUM PHOSPHATE 4 MG: 4 INJECTION, SOLUTION INTRA-ARTICULAR; INTRALESIONAL; INTRAMUSCULAR; INTRAVENOUS; SOFT TISSUE at 05:03

## 2022-12-05 RX ADMIN — LEVETIRACETAM 500 MG: 500 TABLET, FILM COATED ORAL at 05:03

## 2022-12-05 RX ADMIN — ROCURONIUM BROMIDE 50 MG: 10 INJECTION, SOLUTION INTRAVENOUS at 08:33

## 2022-12-05 RX ADMIN — CEFAZOLIN 2 G: 330 INJECTION, POWDER, FOR SOLUTION INTRAMUSCULAR; INTRAVENOUS at 08:50

## 2022-12-05 RX ADMIN — DEXAMETHASONE SODIUM PHOSPHATE 4 MG: 4 INJECTION, SOLUTION INTRA-ARTICULAR; INTRALESIONAL; INTRAMUSCULAR; INTRAVENOUS; SOFT TISSUE at 23:40

## 2022-12-05 RX ADMIN — HEPARIN SODIUM 6000 UNITS: 1000 INJECTION, SOLUTION INTRAVENOUS; SUBCUTANEOUS at 09:55

## 2022-12-05 RX ADMIN — LEVETIRACETAM 500 MG: 500 TABLET, FILM COATED ORAL at 17:06

## 2022-12-05 RX ADMIN — PROPOFOL 150 MG: 10 INJECTION, EMULSION INTRAVENOUS at 08:33

## 2022-12-05 RX ADMIN — ACETAMINOPHEN 650 MG: 325 TABLET, FILM COATED ORAL at 20:48

## 2022-12-05 RX ADMIN — IOHEXOL 150 ML: 300 INJECTION, SOLUTION INTRAVENOUS at 11:15

## 2022-12-05 RX ADMIN — MIDAZOLAM 2 MG: 1 INJECTION INTRAMUSCULAR; INTRAVENOUS at 08:30

## 2022-12-05 RX ADMIN — LIDOCAINE HYDROCHLORIDE 100 MG: 20 INJECTION, SOLUTION EPIDURAL; INFILTRATION; INTRACAUDAL at 08:33

## 2022-12-05 RX ADMIN — HEPARIN SODIUM 1000 UNITS: 1000 INJECTION, SOLUTION INTRAVENOUS; SUBCUTANEOUS at 10:20

## 2022-12-05 RX ADMIN — ROCURONIUM BROMIDE 30 MG: 10 INJECTION, SOLUTION INTRAVENOUS at 09:13

## 2022-12-05 RX ADMIN — ONDANSETRON 4 MG: 2 INJECTION INTRAMUSCULAR; INTRAVENOUS at 08:45

## 2022-12-05 RX ADMIN — LOSARTAN POTASSIUM 50 MG: 50 TABLET, FILM COATED ORAL at 05:03

## 2022-12-05 RX ADMIN — SODIUM CHLORIDE, SODIUM GLUCONATE, SODIUM ACETATE, POTASSIUM CHLORIDE AND MAGNESIUM CHLORIDE: 526; 502; 368; 37; 30 INJECTION, SOLUTION INTRAVENOUS at 08:25

## 2022-12-05 RX ADMIN — DEXAMETHASONE SODIUM PHOSPHATE 4 MG: 4 INJECTION, SOLUTION INTRA-ARTICULAR; INTRALESIONAL; INTRAMUSCULAR; INTRAVENOUS; SOFT TISSUE at 08:45

## 2022-12-05 RX ADMIN — DOCUSATE SODIUM 50 MG AND SENNOSIDES 8.6 MG 2 TABLET: 8.6; 5 TABLET, FILM COATED ORAL at 17:05

## 2022-12-05 RX ADMIN — ROCURONIUM BROMIDE 20 MG: 10 INJECTION, SOLUTION INTRAVENOUS at 09:49

## 2022-12-05 RX ADMIN — DOCUSATE SODIUM 50 MG AND SENNOSIDES 8.6 MG 2 TABLET: 8.6; 5 TABLET, FILM COATED ORAL at 05:03

## 2022-12-05 RX ADMIN — LOSARTAN POTASSIUM 50 MG: 50 TABLET, FILM COATED ORAL at 17:06

## 2022-12-05 RX ADMIN — PROPOFOL 50 MG: 10 INJECTION, EMULSION INTRAVENOUS at 11:00

## 2022-12-05 ASSESSMENT — ENCOUNTER SYMPTOMS
SHORTNESS OF BREATH: 0
MEMORY LOSS: 0
NERVOUS/ANXIOUS: 0
FEVER: 0
VOMITING: 0
FOCAL WEAKNESS: 0
DOUBLE VISION: 0
ABDOMINAL PAIN: 0
PALPITATIONS: 0
BLURRED VISION: 0
NECK PAIN: 0
HEADACHES: 1
SPEECH CHANGE: 0
SENSORY CHANGE: 0
BACK PAIN: 0
COUGH: 0
MYALGIAS: 0
INSOMNIA: 0
CHILLS: 0
NAUSEA: 0

## 2022-12-05 ASSESSMENT — LIFESTYLE VARIABLES
TOTAL SCORE: 0
EVER FELT BAD OR GUILTY ABOUT YOUR DRINKING: NO
ALCOHOL_USE: NO
CONSUMPTION TOTAL: NEGATIVE
HAVE YOU EVER FELT YOU SHOULD CUT DOWN ON YOUR DRINKING: NO
TOTAL SCORE: 0
HOW MANY TIMES IN THE PAST YEAR HAVE YOU HAD 5 OR MORE DRINKS IN A DAY: 0
DOES PATIENT WANT TO STOP DRINKING: NO
AVERAGE NUMBER OF DAYS PER WEEK YOU HAVE A DRINK CONTAINING ALCOHOL: 0
EVER HAD A DRINK FIRST THING IN THE MORNING TO STEADY YOUR NERVES TO GET RID OF A HANGOVER: NO
ON A TYPICAL DAY WHEN YOU DRINK ALCOHOL HOW MANY DRINKS DO YOU HAVE: 0
TOTAL SCORE: 0
HAVE PEOPLE ANNOYED YOU BY CRITICIZING YOUR DRINKING: NO

## 2022-12-05 ASSESSMENT — PATIENT HEALTH QUESTIONNAIRE - PHQ9
SUM OF ALL RESPONSES TO PHQ9 QUESTIONS 1 AND 2: 0
1. LITTLE INTEREST OR PLEASURE IN DOING THINGS: NOT AT ALL
2. FEELING DOWN, DEPRESSED, IRRITABLE, OR HOPELESS: NOT AT ALL

## 2022-12-05 ASSESSMENT — PAIN DESCRIPTION - PAIN TYPE
TYPE: SURGICAL PAIN
TYPE: ACUTE PAIN
TYPE: SURGICAL PAIN
TYPE: ACUTE PAIN
TYPE: SURGICAL PAIN
TYPE: SURGICAL PAIN
TYPE: ACUTE PAIN
TYPE: SURGICAL PAIN

## 2022-12-05 ASSESSMENT — PAIN SCALES - GENERAL: PAIN_LEVEL: 0

## 2022-12-05 ASSESSMENT — FIBROSIS 4 INDEX: FIB4 SCORE: 0.63

## 2022-12-05 NOTE — OR SURGEON
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 These vessels were embolized with 150-300 um particles and gelfoam      Procedure(s): Cerebral angiogram and tumor embolization      Estimated Blood Loss: ~ 55 ml        Complications: None            12/5/2022     10:54 AM     Adelina Mcdonald M.D.

## 2022-12-05 NOTE — H&P
History and Physical    Date: 12/5/2022    PCP: Pcp Pt States None      CC: Mass in the parietal region     HPI: This is a 57 y.o. male who who presented 11/30/2022  complaining of headaches, apparently patient has been having headache for at least 2 months, patient's headache is moderate to severe, patient denies any focal weakness no numbness no tingling, patient has intermittent blurry vision but at this time and he is not having any vision problems, denies any any neck pain no dizziness lightheadedness no chest pain no palpitations, patient denies any family history of cancer, patient is not a smoker does not drink alcohol, patient was evaluated that [facility] where he had a CT head that showed possible occipital mass with midline shift patient was sent to [facility] for neurosurgery evaluation.     Past Medical History:   Diagnosis Date    Hypertension     Known health problems: none        No past surgical history on file.    No current facility-administered medications for this encounter.     No current outpatient medications on file.     Facility-Administered Medications Ordered in Other Encounters   Medication Dose Route Frequency Provider Last Rate Last Admin    FENTANYL CITRATE (PF) 0.05 MG/ML INJ SOLN (WRAPPED)             MIDAZOLAM HCL 2 MG/2ML INJ SOLN (WRAPPED)             CLEVIDIPINE 25 MG/50ML IV EMUL             levETIRAcetam (KEPPRA) tablet 500 mg  500 mg Oral BID Yossi Williamson M.D.   500 mg at 12/05/22 0503    dexamethasone (DECADRON) injection 4 mg  4 mg Intravenous Q6HRS Yossi Williamson M.D.   4 mg at 12/05/22 0503    losartan (COZAAR) tablet 50 mg  50 mg Oral BID Yossi Williamson M.D.   50 mg at 12/05/22 0503    senna-docusate (PERICOLACE or SENOKOT S) 8.6-50 MG per tablet 2 Tablet  2 Tablet Oral BID Yossi Williamson M.D.   2 Tablet at 12/05/22 0503    And    polyethylene glycol/lytes (MIRALAX) PACKET 1 Packet  1 Packet Oral QDAY PRN Yossi Williamson M.D.         And    magnesium hydroxide (MILK OF MAGNESIA) suspension 30 mL  30 mL Oral QDAY PRN Yossi Williamson M.D.        And    bisacodyl (DULCOLAX) suppository 10 mg  10 mg Rectal QDAY PROREN Williamson M.D.        Respiratory Therapy Consult   Nebulization Continuous RT Yossi Williamson M.D.        acetaminophen (Tylenol) tablet 650 mg  650 mg Oral Q6HRS PROREN Williamson M.D.   650 mg at 12/03/22 2343    ondansetron (ZOFRAN) syringe/vial injection 4 mg  4 mg Intravenous Q4HRS PROREN Williamson M.D.        ondansetron (ZOFRAN ODT) dispertab 4 mg  4 mg Oral Q4HRS PROREN Williamson M.D.        promethazine (PHENERGAN) tablet 12.5-25 mg  12.5-25 mg Oral Q4HRS SHARI Williamson M.D.        promethazine (PHENERGAN) suppository 12.5-25 mg  12.5-25 mg Rectal Q4HRS PROREN Williamson M.D.        prochlorperazine (COMPAZINE) injection 5-10 mg  5-10 mg Intravenous Q4HRS PROREN Williamson M.D.        hydrALAZINE (APRESOLINE) injection 10 mg  10 mg Intravenous Q4HRS PROREN Williamson M.D.            Social History     Socioeconomic History    Marital status: Single     Spouse name: Not on file    Number of children: Not on file    Years of education: Not on file    Highest education level: Not on file   Occupational History    Not on file   Tobacco Use    Smoking status: Never    Smokeless tobacco: Not on file   Vaping Use    Vaping Use: Never used   Substance and Sexual Activity    Alcohol use: No    Drug use: No    Sexual activity: Not on file   Other Topics Concern    Not on file   Social History Narrative    Not on file     Social Determinants of Health     Financial Resource Strain: Not on file   Food Insecurity: Not on file   Transportation Needs: Not on file   Physical Activity: Not on file   Stress: Not on file   Social Connections: Not on file   Intimate Partner Violence: Not on file   Housing Stability: Not on file       No family history on  file.    Allergies:  Patient has no known allergies.    Review of Systems:  Negative    Physical Exam  Nursing note reviewed.   Constitutional:       General: He is not in acute distress.     Appearance: Normal appearance.   Pulmonary:      Effort: Pulmonary effort is normal. No respiratory distress.   Skin:     General: Skin is warm and dry.   Neurological:      General: No focal deficit present.      Mental Status: He is alert and oriented to person, place, and time.   Psychiatric:         Mood and Affect: Mood normal.         Behavior: Behavior normal.         Thought Content: Thought content normal.         Judgment: Judgment normal.       Vital Signs                           Labs:      Recent Labs     12/03/22  0702 12/04/22  0345 12/05/22  0625   SODIUM 137 135 133*   POTASSIUM 4.4 4.6 4.2   CHLORIDE 101 101 98   CO2 25 25 25   GLUCOSE 167* 180* 166*   BUN 24* 23* 22   CREATININE 0.61 0.67 0.65   CALCIUM 9.3 9.0 9.0         Recent Labs     12/03/22  0702 12/04/22  0345 12/05/22  0625   ASTSGOT 9* 7* 9*   ALTSGPT 15 13 14   TBILIRUBIN 0.8 0.5 1.0   ALKPHOSPHAT 96 91 89   GLOBULIN 2.2 2.1 2.1       Radiology:  No orders to display         Assessment and Plan:This is a 57 y.o. 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 Plan to perform cerebral angiogram and possibly embolize feeders if amenable to the same. Risks/benefits/alternatives discussed, consent obtained and will proceed under general anesthesia.

## 2022-12-05 NOTE — ANESTHESIA PREPROCEDURE EVALUATION
Date/Time: 12/05/22 0800    Scheduled providers: Adelina Mcdonald M.D.; Andres Polanco M.D.    Procedure: IR-EMBOLIZE-NEURO-INTRACRANIAL    Indications:       HEADACHE      Embolize tumor presurgery    Location: [facility]          Relevant Problems   CARDIAC   (positive) Hypertension       Physical Exam    Airway   Mallampati: II  TM distance: >3 FB  Neck ROM: full       Cardiovascular - normal exam  Rhythm: regular  Rate: normal  (-) murmur     Dental - normal exam        Facial Hair   Pulmonary - normal exam  Breath sounds clear to auscultation     Abdominal    Neurological - normal exam                 Anesthesia Plan    ASA 2       Plan - general       Airway plan will be ETT          Induction: intravenous    Postoperative Plan: Postoperative administration of opioids is intended.    Pertinent diagnostic labs and testing reviewed    Informed Consent:    Anesthetic plan and risks discussed with patient.    Use of blood products discussed with: patient whom consented to blood products.

## 2022-12-05 NOTE — PROGRESS NOTES
4 Eyes Skin Assessment Completed by Yesi, RN and PERCY Du.    Head WDL  Ears WDL  Nose WDL  Mouth WDL  Neck WDL  Breast/Chest WDL  Shoulder Blades WDL  Spine WDL  (R) Arm/Elbow/Hand WDL  (L) Arm/Elbow/Hand WDL  Abdomen WDL  Groin Incision  Scrotum/Coccyx/Buttocks WDL  (R) Leg WDL  (L) Leg WDL  (R) Heel/Foot/Toe WDL  (L) Heel/Foot/Toe WDL          Devices In Places ECG, Blood Pressure Cuff, Pulse Ox, Arterial Line, and SCD's      Interventions In Place Sacral Mepilex, Pillows, Q2 Turns, and Low Air Loss Mattress    Possible Skin Injury No    Pictures Uploaded Into Epic N/A  Wound Consult Placed N/A  RN Wound Prevention Protocol Ordered Yes

## 2022-12-05 NOTE — HOSPITAL COURSE
"Ambrosio Hardy is a 57-year-old male who presented 11/30/2022 with headache x2 months.  He describes the pain located in the back of his head 6-7/10.  He presented to [facility] in [facility] where CT showed occipital mass with shift.  He was sent here for neurosurgery evaluation.  He was evaluated by Dr. Donis who started him on Decadron and Keppra.  CT chest/abdomen/pelvis showed no evidence of metastasis.  Dr. Donis plans on surgery Wednesday 12/7 and recommended preoperative angiogram for possible embolization.  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  These vessels were embolized with 150-300 UM particles and Gelfoam\" (Dr. Mcdonald procedure note 12/5).     12/5: Mass embolization  12/7: Mass resection, returned on vent  "

## 2022-12-05 NOTE — CARE PLAN
The patient is Stable - Low risk of patient condition declining or worsening    Shift Goals  Clinical Goals: Stable Neuro Status; NPO at midnight  Patient Goals: Rest  Family Goals: NURA    Progress made toward(s) clinical / shift goals:  Assumed care of pt at 1845. POC discussed with pt. Pt A/Ox 4 and verbalized understanding. Pt on fall/seizure/aspiration precautions. Pt on Q4hr neuro checks. Pt educated on NPO status at midnight. Pt made NPO. Linen change completed. CHG in morning before IR embolization. Pt educated to use the call light for assistance. Call light within reach. Pt rounded on frequently throughout the shift.      Problem: Pain - Standard  Goal: Alleviation of pain or a reduction in pain to the patient’s comfort goal  Outcome: Progressing  Note: Pt's pain assessed throughout shift. Patient offered pharmacological and non-pharmacological interventions.      Problem: Fall Risk  Outcome: Progressing  Note: Fall precautions in place. Bed locked and in lowest position, bed alarm in place, treaded socks used when ambulated, call light within reach. Pt educated to call for assistance. Pt rounded on frequently throughout shift.       Problem: Neuro Status  Goal: Neuro status will remain stable or improve  Outcome: Progressing  Note: Pt's neuro status assessed throughout the shift with frequent neuro checks. Pt has maintained a stable neuro status.         Patient is not progressing towards the following goals:

## 2022-12-05 NOTE — ASSESSMENT & PLAN NOTE
Patient presented to outside facility with headache x2 months  No family history  MRI brain here showed 6.7 x 4.7 x 5.9 cm size enhancing extra-axial mass noted bilateral mid posterior parietal region with 6 mm midline shift  Neurosurgery consulted and started on Keppra and steroids.    Surgical resection 12/7  IR embolization 12/6

## 2022-12-05 NOTE — CONSULTS
Critical Care Consultation    Date of consult: 12/5/2022    Referring Physician  Dieudonne Donis M.D.    Reason for Consultation  I was asked to consult on this patient status post IR angiogram for ICU admission for serial neurologic exams and close hemodynamic monitoring.    History of Presenting Illness  Ambrosio Hardy is a 57-year-old male with PMH significant for HTN who presented 11/30/2022 with headache x2 months.  He describes the pain located in the back of his head 6-7/10.  He presented to [facility] in [facility] where CT showed occipital mass with shift.  He was sent here for neurosurgery evaluation.  He was evaluated by Dr. Donis who started him on Decadron and Keppra.  CT chest/abdomen/pelvis showed no evidence of metastasis.  Dr. Donis plans on surgery Wednesday 12/7 and recommended preoperative angiogram for possible embolization.    Code Status  Full Code    Review of Systems  Review of Systems   Constitutional:  Negative for chills and fever.   HENT:  Negative for hearing loss and tinnitus.    Eyes:  Negative for blurred vision and double vision.   Respiratory:  Negative for cough and shortness of breath.    Cardiovascular:  Negative for chest pain and palpitations.   Gastrointestinal:  Negative for abdominal pain, nausea and vomiting.   Genitourinary:  Negative for dysuria, frequency and urgency.   Musculoskeletal:  Negative for back pain, myalgias and neck pain.   Neurological:  Positive for headaches. Negative for sensory change, speech change and focal weakness.   Psychiatric/Behavioral:  Negative for memory loss. The patient is not nervous/anxious and does not have insomnia.    All other systems reviewed and are negative.    Past Medical History   has a past medical history of Hypertension and Known health problems: none.    Surgical History   has no past surgical history on file.    Family History  family history is not on file.    Social History   reports that he has never smoked. He  does not have any smokeless tobacco history on file. He reports that he does not drink alcohol and does not use drugs.    Medications  Home Medications       Reviewed by Casie Fry (Pharmacy Tech) on 11/30/22 at 2052  Med List Status: Complete     Medication Last Dose Status   losartan (COZAAR) 50 MG Tab 11/30/2022 Active                  Current Facility-Administered Medications   Medication Dose Route Frequency Provider Last Rate Last Admin    labetalol (NORMODYNE/TRANDATE) injection 10-20 mg  10-20 mg Intravenous Q4HRS PRN SAWYER Peña        levETIRAcetam (KEPPRA) tablet 500 mg  500 mg Oral BID Yossi Williamson M.D.   500 mg at 12/05/22 0503    dexamethasone (DECADRON) injection 4 mg  4 mg Intravenous Q6HRS Yossi Williamson M.D.   4 mg at 12/05/22 0503    losartan (COZAAR) tablet 50 mg  50 mg Oral BID Yossi Williamson M.D.   50 mg at 12/05/22 0503    senna-docusate (PERICOLACE or SENOKOT S) 8.6-50 MG per tablet 2 Tablet  2 Tablet Oral BID Yossi Williamson M.D.   2 Tablet at 12/05/22 0503    And    polyethylene glycol/lytes (MIRALAX) PACKET 1 Packet  1 Packet Oral QDAY PRN Yossi Williamson M.D.        And    magnesium hydroxide (MILK OF MAGNESIA) suspension 30 mL  30 mL Oral QDAY PRN Yossi Williamson M.D.        And    bisacodyl (DULCOLAX) suppository 10 mg  10 mg Rectal QDAY PRN Yossi Williamson M.D.        Respiratory Therapy Consult   Nebulization Continuous RT Yossi Williamson M.D.        acetaminophen (Tylenol) tablet 650 mg  650 mg Oral Q6HRS PRN Yossi Williamson M.D.   650 mg at 12/03/22 2343    ondansetron (ZOFRAN) syringe/vial injection 4 mg  4 mg Intravenous Q4HRS PRN Yossi Williamson M.D.        ondansetron (ZOFRAN ODT) dispertab 4 mg  4 mg Oral Q4HRS PRN Yossi Williamson M.D.        promethazine (PHENERGAN) tablet 12.5-25 mg  12.5-25 mg Oral Q4HRS PRN Yossi Williamson M.D.        promethazine (PHENERGAN) suppository  12.5-25 mg  12.5-25 mg Rectal Q4HRS PRN Yossi Williamson M.D.        prochlorperazine (COMPAZINE) injection 5-10 mg  5-10 mg Intravenous Q4HRS PROREN Williamson M.D.        hydrALAZINE (APRESOLINE) injection 10 mg  10 mg Intravenous Q4HRS PROREN Williamson M.D.           Allergies  No Known Allergies    Vital Signs last 24 hours  Temp:  [36.1 °C (96.9 °F)-37.2 °C (99 °F)] 36.5 °C (97.7 °F)  Pulse:  [53-67] 67  Resp:  [13-20] 16  BP: (117-143)/(67-82) 126/72  SpO2:  [93 %-99 %] 95 %    Physical Exam  Physical Exam  Vitals and nursing note reviewed.   Constitutional:       General: He is not in acute distress.     Appearance: Normal appearance. He is well-developed. He is not ill-appearing or toxic-appearing.   HENT:      Head: Normocephalic.      Right Ear: Hearing normal.      Left Ear: Hearing normal.      Nose: Nose normal.      Mouth/Throat:      Lips: Pink.      Mouth: Mucous membranes are moist.   Eyes:      Pupils: Pupils are equal, round, and reactive to light.      Visual Fields: Right eye visual fields normal and left eye visual fields normal.   Cardiovascular:      Rate and Rhythm: Normal rate and regular rhythm.      Pulses: Normal pulses.      Heart sounds: Normal heart sounds.      Comments: Left radial artline  Pulmonary:      Effort: Pulmonary effort is normal.      Breath sounds: Normal breath sounds. No wheezing.   Abdominal:      General: Bowel sounds are normal.      Palpations: Abdomen is soft.      Tenderness: There is no abdominal tenderness. There is no guarding or rebound.   Musculoskeletal:      Right lower leg: No edema.      Left lower leg: No edema.      Comments: CHAVARRIA 5/5   Skin:     General: Skin is warm and dry.      Capillary Refill: Capillary refill takes less than 2 seconds.   Neurological:      General: No focal deficit present.      Mental Status: He is alert and oriented to person, place, and time.      GCS: GCS eye subscore is 4. GCS verbal subscore is 5. GCS  motor subscore is 6.      Cranial Nerves: Cranial nerves 2-12 are intact.      Sensory: Sensation is intact.      Motor: Motor function is intact.      Coordination: Coordination is intact.   Psychiatric:         Mood and Affect: Mood normal.         Behavior: Behavior is cooperative.         Cognition and Memory: Cognition normal.         Judgment: Judgment normal.       Fluids    Intake/Output Summary (Last 24 hours) at 12/5/2022 1438  Last data filed at 12/5/2022 1430  Gross per 24 hour   Intake 700 ml   Output 500 ml   Net 200 ml       Laboratory  Recent Results (from the past 48 hour(s))   Comp Metabolic Panel    Collection Time: 12/04/22  3:45 AM   Result Value Ref Range    Sodium 135 135 - 145 mmol/L    Potassium 4.6 3.6 - 5.5 mmol/L    Chloride 101 96 - 112 mmol/L    Co2 25 20 - 33 mmol/L    Anion Gap 9.0 7.0 - 16.0    Glucose 180 (H) 65 - 99 mg/dL    Bun 23 (H) 8 - 22 mg/dL    Creatinine 0.67 0.50 - 1.40 mg/dL    Calcium 9.0 8.5 - 10.5 mg/dL    AST(SGOT) 7 (L) 12 - 45 U/L    ALT(SGPT) 13 2 - 50 U/L    Alkaline Phosphatase 91 30 - 99 U/L    Total Bilirubin 0.5 0.1 - 1.5 mg/dL    Albumin 3.6 3.2 - 4.9 g/dL    Total Protein 5.7 (L) 6.0 - 8.2 g/dL    Globulin 2.1 1.9 - 3.5 g/dL    A-G Ratio 1.7 g/dL   ESTIMATED GFR    Collection Time: 12/04/22  3:45 AM   Result Value Ref Range    GFR (CKD-EPI) 109 >60 mL/min/1.73 m 2   Comp Metabolic Panel    Collection Time: 12/05/22  6:25 AM   Result Value Ref Range    Sodium 133 (L) 135 - 145 mmol/L    Potassium 4.2 3.6 - 5.5 mmol/L    Chloride 98 96 - 112 mmol/L    Co2 25 20 - 33 mmol/L    Anion Gap 10.0 7.0 - 16.0    Glucose 166 (H) 65 - 99 mg/dL    Bun 22 8 - 22 mg/dL    Creatinine 0.65 0.50 - 1.40 mg/dL    Calcium 9.0 8.5 - 10.5 mg/dL    AST(SGOT) 9 (L) 12 - 45 U/L    ALT(SGPT) 14 2 - 50 U/L    Alkaline Phosphatase 89 30 - 99 U/L    Total Bilirubin 1.0 0.1 - 1.5 mg/dL    Albumin 3.7 3.2 - 4.9 g/dL    Total Protein 5.8 (L) 6.0 - 8.2 g/dL    Globulin 2.1 1.9 - 3.5 g/dL     A-G Ratio 1.8 g/dL   ESTIMATED GFR    Collection Time: 12/05/22  6:25 AM   Result Value Ref Range    GFR (CKD-EPI) 110 >60 mL/min/1.73 m 2   POCT activated clotting time device results    Collection Time: 12/05/22 10:15 AM   Result Value Ref Range    Istat Activated Clotting Time 203 (H) 74 - 137 sec       Imaging  MR-STEALTH BRAIN WITH & W/O   Final Result      There is an approximately 6.7 x 4.7 x 5.9 cm sized enhancing extra-axial mass noted in the bilateral mid posterior parietal region along the both sides of posterior falx cerebri. The superior sagittal sinus is infiltrated and surrounded by this tumor.    There is also abnormal adjacent dural enhancement .The adjacent parietal/occipital bones are infiltrated by this tumor. There is also hyperostosis of the calvarial surface of the bones with enhancement. There are multiple enlarged blood vessels are noted    along the anterior margin of the lesion. There is diffuse white matter edema noted in the bilateral parietal and occipital lobes surrounding the lesion.There is an approximately 6 mm midline shift towards right side. 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 The other differential diagnosis includes hemangiopericytoma and dural based metastasis. Findings were discussed with SUSY PRATT on 12/1/2022 7:43 AM.      MR-BRAIN-WITH & W/O   Final Result      There is an approximately 6.7 x 4.7 x 5.9 cm sized enhancing extra-axial mass noted in the bilateral mid posterior parietal region along the both sides of posterior falx cerebri. The superior sagittal sinus is infiltrated and surrounded by this tumor.    There is also abnormal adjacent dural enhancement .The adjacent parietal/occipital bones are infiltrated by this tumor. There is also hyperostosis of the calvarial surface of the bones with enhancement. There are multiple enlarged blood vessels are noted    along the  anterior margin of the lesion. There is diffuse white matter edema noted in the bilateral parietal and occipital lobes surrounding the lesion.There is an approximately 6 mm midline shift towards right side. 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 The other differential diagnosis includes hemangiopericytoma and dural based metastasis. Findings were discussed with SUSY PRATT on 12/1/2022 7:43 AM.      CT-CHEST,ABDOMEN,PELVIS WITH   Final Result         1.  No significant abnormality in thorax, abdomen and pelvis CT scan.   2.  Hepatomegaly   3.  Cholelithiasis   4.  Segment 2 duodenal diverticula   5.  Atherosclerosis   6.  Fat-containing left inguinal hernia   7.  Fat-containing umbilical hernia   8.  Diverticulosis   9.  Main pulmonary artery dilatation, appearance suggests changes related to pulmonary artery aneurysm or pulmonary hypertension.      IR-EMBOLIZE-NEURO-INTRACRANIAL    (Results Pending)       Assessment/Plan  * Brain mass- (present on admission)  Assessment & Plan  -Patient presented to outside facility with headache x2 months  -No focal neurodeficits  -No family history of cancer  -MRI brain here showed 6.7 x 4.7 x 5.9 cm size enhancing extra-axial mass noted bilateral mid posterior parietal region with 6 mm midline shift  -Neurosurgery consulted and started on Keppra and steroids.  Plan for surgical intervention 12/7  -To IR for angio today    Hyponatremia- (present on admission)  Assessment & Plan  -With hyperglycemia  -Mild  -Follow BMP    Hyperglycemia- (present on admission)  Assessment & Plan  -On steroids, no known history of DM2  -A1c 6.4  -Monitor -goal 140-180    Hypertension- (present on admission)  Assessment & Plan  -On losartan as outpatient  -As needed IV antihypertensives with parameters        Discussed patient condition and risk of morbidity and/or mortality with RN, Patient, and my attending  Dr. Sidhu .    The patient remains critically ill.  Critical care time = 42 minutes in directly providing and coordinating critical care and extensive data review.  No time overlap and excludes procedures.    Please note that this dictation was created using voice recognition software. I have made every reasonable attempt to correct obvious errors, but there may be errors of grammar and possibly content that I did not discover before finalizing the note.    YENI Peña.

## 2022-12-05 NOTE — ANESTHESIA PROCEDURE NOTES
Arterial Line  Performed by: Andres Polanco M.D.  Authorized by: Andres Polanco M.D.     Start Time:  12/5/2022 8:40 AM  End Time:  12/5/2022 8:43 AM  Localization: surface landmarks    Patient Location:  OR  Indication: continuous blood pressure monitoring        Catheter Size:  20 G  Seldinger Technique?: Yes    Laterality:  Left  Site:  Radial artery  Line Secured:  Antimicrobial disc, tape and transparent dressing  Events: patient tolerated procedure well with no complications

## 2022-12-05 NOTE — OR NURSING
Patient recovered well in post-op. AAOx4. VSS, on RA. R groin clean, dry, soft. Strict bedrest throughout recovery. Declines pain/nausea. Patient tolerating fluids. Family updated and discussed POC. No belongings in pacu. Report called to PERCY Key. Patient transported to R105 on monitor w/ RN.

## 2022-12-05 NOTE — ANESTHESIA PROCEDURE NOTES
Airway    Date/Time: 12/5/2022 8:36 AM  Performed by: Andres Polanco M.D.  Authorized by: Andres Polanco M.D.     Location:  OR  Urgency:  Elective  Difficult Airway: No    Indications for Airway Management:  Anesthesia      Spontaneous Ventilation: absent    Sedation Level:  Deep  Preoxygenated: Yes    Patient Position:  Sniffing  Mask Difficulty Assessment:  2 - vent by mask + OA or adjuvant +/- NMBA  Final Airway Type:  Endotracheal airway  Final Endotracheal Airway:  ETT  Cuffed: Yes    Technique Used for Successful ETT Placement:  Direct laryngoscopy    Insertion Site:  Oral  Blade Type:  Wilson  Laryngoscope Blade/Videolaryngoscope Blade Size:  2  ETT Size (mm):  8.0  Measured from:  Teeth  ETT to Teeth (cm):  24  Placement Verified by: auscultation and capnometry    Cormack-Lehane Classification:  Grade IIa - partial view of glottis  Number of Attempts at Approach:  1

## 2022-12-05 NOTE — PROGRESS NOTES
IR Nursing Note     Neuro embolization by MD Woods assisted by RT Patten, right femoral artery access site.  Consents signed, pt connected to monitor and placed in a supine position. Procedure site verified by MD using imaging guidance. Procedure done under general anesthesia, by MD Polanco. Anesthesiologist assumes complete care of patient, RN to assist with case. For complete vital signs and meds given refer to anesthesiologist flowsheets. Embospheres and Surgifoam used for embolization.    Procedure end time 1050 am.   A Tegaderm and gauze dressing was placed over surgical site.       Report given to RN Gwen GREER.  Patient transported to PACU via his hospital bed. IR RN and anesthesia monitored then transferred care to report RN.      Embosphere 100-330um  Ref #[id number]  Lot #[id number]  Exp:  11/14/2024  Surgifoam ref#[id number]    Perclose Prostyle closure device  Ref #[id number]  Lot #[id number]

## 2022-12-05 NOTE — ANESTHESIA POSTPROCEDURE EVALUATION
Patient: Ambrosio Hardy    Procedure Summary     Date: 12/05/22 Room / Location: [facility]    Anesthesia Start: 0821 Anesthesia Stop: 1114    Procedure: IR-EMBOLIZE-NEURO-INTRACRANIAL Diagnosis:       (HEADACHE)      (Embolize tumor presurgery)    Scheduled Providers: Adelina Mcdonald M.D.; Andres Polanco M.D. Responsible Provider: Andres Polanco M.D.    Anesthesia Type: general ASA Status: 2          Final Anesthesia Type: general  Last vitals  BP        Temp        Pulse       Resp        SpO2          Anesthesia Post Evaluation    Patient location during evaluation: PACU  Patient participation: complete - patient participated  Level of consciousness: awake and alert  Pain score: 0    Airway patency: patent  Anesthetic complications: no  Cardiovascular status: hemodynamically stable  Respiratory status: acceptable  Hydration status: euvolemic    PONV: none          No notable events documented.     Nurse Pain Score: 0 (NPRS)

## 2022-12-05 NOTE — PROGRESS NOTES
Neurosurgery Progress Note    Subjective:  No acute events over night per RN      Exam:    Pt down in IR          BP  Min: 131/75  Max: 143/76  Pulse  Av.3  Min: 60  Max: 67  Resp  Av  Min: 18  Max: 18  Temp  Av.1 °C (98.8 °F)  Min: 37 °C (98.6 °F)  Max: 37.2 °C (99 °F)  SpO2  Av %  Min: 96 %  Max: 96 %    No data recorded          Recent Labs     22  0702 22  0345 22  0625   SODIUM 137 135 133*   POTASSIUM 4.4 4.6 4.2   CHLORIDE 101 101 98   CO2 25 25 25   GLUCOSE 167* 180* 166*   BUN 24* 23* 22   CREATININE 0.61 0.67 0.65   CALCIUM 9.3 9.0 9.0                   Intake/Output                         22 0700 - 22 0659 22 0700 - 22 0659      Total  1022-7393 Total                 Intake    P.O.  360  -- 360  --  -- --    P.O. 360 -- 360 -- -- --    Total Intake 360 -- 360 -- -- --       Output    Urine  --  -- --  --  -- --    Number of Times Voided -- 2 x 2 x -- -- --    Total Output -- -- -- -- -- --       Net I/O     360 -- 360 -- -- --              Intake/Output Summary (Last 24 hours) at 2022 0950  Last data filed at 2022 1400  Gross per 24 hour   Intake 360 ml   Output --   Net 360 ml               clevidipine       levETIRAcetam  500 mg BID    dexamethasone  4 mg Q6HRS    losartan  50 mg BID    senna-docusate  2 Tablet BID    And    polyethylene glycol/lytes  1 Packet QDAY PRN    And    magnesium hydroxide  30 mL QDAY PRN    And    bisacodyl  10 mg QDAY PRN    Respiratory Therapy Consult   Continuous RT    acetaminophen  650 mg Q6HRS PRN    ondansetron  4 mg Q4HRS PRN    ondansetron  4 mg Q4HRS PRN    promethazine  12.5-25 mg Q4HRS PRN    promethazine  12.5-25 mg Q4HRS PRN    prochlorperazine  5-10 mg Q4HRS PRN    hydrALAZINE  10 mg Q4HRS PRN       Assessment and Plan:  Hospital day  5 hypervascular heterogenous enhancing mass based off of the skull on the dura causing significant either invasion or expansion of  the skull and encasement or invasion into the superior sagittal sinus distal to the torcula there is also hypervascularity of the cortical surface in the left occipital lobe.  POD #na  Prophylactic anticoagulation: ambulating    Keppra 500mg BID  On steroids  OR on Wednesday for crani.  MRI stealth completed

## 2022-12-05 NOTE — PROGRESS NOTES
Pt arrived to unit at 1323 with PACU RN and PACU tech. No monitor noted on patient at time of arrival however, VSS on arrival. Skin check performed with RN Ana Laura. Pt has chronic dermatitis throughout body but otherwise skin unremarkable. Belongings delivered to bedside from Neuroscience team. Nothing noted to be missing. Family aware of admission to ICU per PACU RN. No concerns.

## 2022-12-06 ENCOUNTER — APPOINTMENT (OUTPATIENT)
Dept: RADIOLOGY | Facility: MEDICAL CENTER | Age: 57
DRG: 025 | End: 2022-12-06
Attending: NURSE PRACTITIONER
Payer: COMMERCIAL

## 2022-12-06 LAB
ALBUMIN SERPL BCP-MCNC: 3.5 G/DL (ref 3.2–4.9)
ALBUMIN/GLOB SERPL: 1.8 G/DL
ALP SERPL-CCNC: 84 U/L (ref 30–99)
ALT SERPL-CCNC: 12 U/L (ref 2–50)
ANION GAP SERPL CALC-SCNC: 11 MMOL/L (ref 7–16)
APTT PPP: 26.2 SEC (ref 24.7–36)
AST SERPL-CCNC: 8 U/L (ref 12–45)
BILIRUB SERPL-MCNC: 1 MG/DL (ref 0.1–1.5)
BUN SERPL-MCNC: 24 MG/DL (ref 8–22)
CALCIUM SERPL-MCNC: 8.8 MG/DL (ref 8.5–10.5)
CHLORIDE SERPL-SCNC: 98 MMOL/L (ref 96–112)
CO2 SERPL-SCNC: 23 MMOL/L (ref 20–33)
CREAT SERPL-MCNC: 0.63 MG/DL (ref 0.5–1.4)
EKG IMPRESSION: NORMAL
ERYTHROCYTE [DISTWIDTH] IN BLOOD BY AUTOMATED COUNT: 43.6 FL (ref 35.9–50)
GFR SERPLBLD CREATININE-BSD FMLA CKD-EPI: 111 ML/MIN/1.73 M 2
GLOBULIN SER CALC-MCNC: 2 G/DL (ref 1.9–3.5)
GLUCOSE SERPL-MCNC: 166 MG/DL (ref 65–99)
HCT VFR BLD AUTO: 43.9 % (ref 42–52)
HGB BLD-MCNC: 15 G/DL (ref 14–18)
INR PPP: 1.02 (ref 0.87–1.13)
MAGNESIUM SERPL-MCNC: 2.2 MG/DL (ref 1.5–2.5)
MCH RBC QN AUTO: 29.8 PG (ref 27–33)
MCHC RBC AUTO-ENTMCNC: 34.2 G/DL (ref 33.7–35.3)
MCV RBC AUTO: 87.3 FL (ref 81.4–97.8)
PHOSPHATE SERPL-MCNC: 4.2 MG/DL (ref 2.5–4.5)
PLATELET # BLD AUTO: 184 K/UL (ref 164–446)
PMV BLD AUTO: 9.4 FL (ref 9–12.9)
POTASSIUM SERPL-SCNC: 4.4 MMOL/L (ref 3.6–5.5)
PROT SERPL-MCNC: 5.5 G/DL (ref 6–8.2)
PROTHROMBIN TIME: 13.3 SEC (ref 12–14.6)
RBC # BLD AUTO: 5.03 M/UL (ref 4.7–6.1)
SODIUM SERPL-SCNC: 132 MMOL/L (ref 135–145)
WBC # BLD AUTO: 11.1 K/UL (ref 4.8–10.8)

## 2022-12-06 PROCEDURE — 84100 ASSAY OF PHOSPHORUS: CPT

## 2022-12-06 PROCEDURE — 99291 CRITICAL CARE FIRST HOUR: CPT | Performed by: NURSE PRACTITIONER

## 2022-12-06 PROCEDURE — 85027 COMPLETE CBC AUTOMATED: CPT

## 2022-12-06 PROCEDURE — 700102 HCHG RX REV CODE 250 W/ 637 OVERRIDE(OP): Performed by: HOSPITALIST

## 2022-12-06 PROCEDURE — 93010 ELECTROCARDIOGRAM REPORT: CPT | Performed by: INTERNAL MEDICINE

## 2022-12-06 PROCEDURE — 85610 PROTHROMBIN TIME: CPT

## 2022-12-06 PROCEDURE — 83735 ASSAY OF MAGNESIUM: CPT

## 2022-12-06 PROCEDURE — A9270 NON-COVERED ITEM OR SERVICE: HCPCS | Performed by: HOSPITALIST

## 2022-12-06 PROCEDURE — 770022 HCHG ROOM/CARE - ICU (200)

## 2022-12-06 PROCEDURE — 71045 X-RAY EXAM CHEST 1 VIEW: CPT

## 2022-12-06 PROCEDURE — 85730 THROMBOPLASTIN TIME PARTIAL: CPT

## 2022-12-06 PROCEDURE — 700111 HCHG RX REV CODE 636 W/ 250 OVERRIDE (IP): Performed by: HOSPITALIST

## 2022-12-06 PROCEDURE — 80053 COMPREHEN METABOLIC PANEL: CPT

## 2022-12-06 PROCEDURE — 93005 ELECTROCARDIOGRAM TRACING: CPT | Performed by: NURSE PRACTITIONER

## 2022-12-06 RX ADMIN — LEVETIRACETAM 500 MG: 500 TABLET, FILM COATED ORAL at 18:28

## 2022-12-06 RX ADMIN — DEXAMETHASONE SODIUM PHOSPHATE 4 MG: 4 INJECTION, SOLUTION INTRA-ARTICULAR; INTRALESIONAL; INTRAMUSCULAR; INTRAVENOUS; SOFT TISSUE at 12:24

## 2022-12-06 RX ADMIN — LOSARTAN POTASSIUM 50 MG: 50 TABLET, FILM COATED ORAL at 05:34

## 2022-12-06 RX ADMIN — DEXAMETHASONE SODIUM PHOSPHATE 4 MG: 4 INJECTION, SOLUTION INTRA-ARTICULAR; INTRALESIONAL; INTRAMUSCULAR; INTRAVENOUS; SOFT TISSUE at 23:59

## 2022-12-06 RX ADMIN — LOSARTAN POTASSIUM 50 MG: 50 TABLET, FILM COATED ORAL at 18:28

## 2022-12-06 RX ADMIN — DOCUSATE SODIUM 50 MG AND SENNOSIDES 8.6 MG 2 TABLET: 8.6; 5 TABLET, FILM COATED ORAL at 18:28

## 2022-12-06 RX ADMIN — DEXAMETHASONE SODIUM PHOSPHATE 4 MG: 4 INJECTION, SOLUTION INTRA-ARTICULAR; INTRALESIONAL; INTRAMUSCULAR; INTRAVENOUS; SOFT TISSUE at 18:28

## 2022-12-06 RX ADMIN — ACETAMINOPHEN 650 MG: 325 TABLET, FILM COATED ORAL at 02:30

## 2022-12-06 RX ADMIN — DOCUSATE SODIUM 50 MG AND SENNOSIDES 8.6 MG 2 TABLET: 8.6; 5 TABLET, FILM COATED ORAL at 05:34

## 2022-12-06 RX ADMIN — LEVETIRACETAM 500 MG: 500 TABLET, FILM COATED ORAL at 05:35

## 2022-12-06 RX ADMIN — DEXAMETHASONE SODIUM PHOSPHATE 4 MG: 4 INJECTION, SOLUTION INTRA-ARTICULAR; INTRALESIONAL; INTRAMUSCULAR; INTRAVENOUS; SOFT TISSUE at 05:35

## 2022-12-06 ASSESSMENT — ENCOUNTER SYMPTOMS
CARDIOVASCULAR NEGATIVE: 1
NERVOUS/ANXIOUS: 0
INSOMNIA: 0
MUSCULOSKELETAL NEGATIVE: 1
COUGH: 0
BACK PAIN: 0
FEVER: 0
DOUBLE VISION: 0
CONSTITUTIONAL NEGATIVE: 1
HEADACHES: 1
ABDOMINAL PAIN: 0
SHORTNESS OF BREATH: 0
VOMITING: 0
NECK PAIN: 0
GASTROINTESTINAL NEGATIVE: 1
NAUSEA: 0
PSYCHIATRIC NEGATIVE: 1
MEMORY LOSS: 0
FOCAL WEAKNESS: 0
CHILLS: 0
EYES NEGATIVE: 1
BLURRED VISION: 1
SENSORY CHANGE: 0
SPEECH CHANGE: 0
PHOTOPHOBIA: 0
MYALGIAS: 0
PALPITATIONS: 0
RESPIRATORY NEGATIVE: 1

## 2022-12-06 ASSESSMENT — PAIN DESCRIPTION - PAIN TYPE
TYPE: ACUTE PAIN

## 2022-12-06 ASSESSMENT — PATIENT HEALTH QUESTIONNAIRE - PHQ9
SUM OF ALL RESPONSES TO PHQ9 QUESTIONS 1 AND 2: 0
2. FEELING DOWN, DEPRESSED, IRRITABLE, OR HOPELESS: NOT AT ALL
1. LITTLE INTEREST OR PLEASURE IN DOING THINGS: NOT AT ALL

## 2022-12-06 NOTE — NON-PROVIDER
Progress Note    Date of admission  11/30/2022    Chief Complaint  Vision changes and headache    Hospital Course  Ambrosio Hardy is a 57-year-old male who presented 11/30/2022 with headache x2 months and vision changes to [facility] in [place].  He described the pain located in the back of his head as 6-7/10. CT  at [place] showed occipital mass with midline shift, and he was sent here to [facility] for neurosurgery evaluation.  He was evaluated by Dr. Donis upon his arrival. CT chest/abdomen/pelvis showed no evidence of metastasis. He was started on Keppra prophylactically and Decadron for edema. He was taken to IR on 12/5 for preoperative angiogram with possible embolization and transferred to RICU after procedure. Dr. Donis plans on surgery Wednesday 12/7.      Interval Problem Update  Reviewed last 24 hour events:  -Pt remains stable overnight without fever,seizures, or focal deficits. BP stable, normotensive, and HR SB to SR. Continued c/o of HA with use of acetaminophen for pain as ordered that was effective in pain control per the pt. Pt denies swallowing difficulties with eating today or nausea. LBM reported as 12/3 by pt.    Review of Systems  Review of Systems   Constitutional: Negative.    HENT: Negative.     Eyes: Negative.    Respiratory: Negative.     Cardiovascular: Negative.    Gastrointestinal: Negative.    Genitourinary: Negative.    Musculoskeletal: Negative.    Neurological:  Positive for headaches.   Endo/Heme/Allergies: Negative.    Psychiatric/Behavioral: Negative.        Vital Signs for last 24 hours   Temp:  [36.1 °C (96.9 °F)-37.1 °C (98.7 °F)] 37.1 °C (98.7 °F)  Pulse:  [53-81] 66  Resp:  [13-69] 15  BP: (106-132)/(61-75) 123/69  SpO2:  [92 %-99 %] 94 %    Physical Exam   Physical Exam  Constitutional:       Appearance: Normal appearance. He is normal weight.   HENT:      Head:      Jaw: There is normal jaw occlusion.        Comments: Enlarged occipital area without  injury in approximate area drawn.     Mouth/Throat:      Mouth: Mucous membranes are moist.      Pharynx: Oropharynx is clear.   Eyes:      General: Lids are normal. Gaze aligned appropriately.      Extraocular Movements: Extraocular movements intact.      Right eye: No nystagmus.      Conjunctiva/sclera: Conjunctivae normal.      Pupils: Pupils are equal, round, and reactive to light.   Cardiovascular:      Rate and Rhythm: Normal rate and regular rhythm.      Pulses: Normal pulses.           Radial pulses are 2+ on the right side and 2+ on the left side.        Posterior tibial pulses are 2+ on the right side and 2+ on the left side.      Heart sounds: S1 normal and S2 normal.   Pulmonary:      Effort: Pulmonary effort is normal.      Breath sounds: Normal breath sounds and air entry. No wheezing, rhonchi or rales.   Abdominal:      General: Abdomen is flat. Bowel sounds are normal.      Palpations: Abdomen is soft.      Tenderness: There is no abdominal tenderness.   Musculoskeletal:         General: Normal range of motion.      Cervical back: Normal range of motion and neck supple.      Right lower leg: No edema.      Left lower leg: No edema.   Skin:     General: Skin is warm and dry.      Capillary Refill: Capillary refill takes less than 2 seconds.   Neurological:      General: No focal deficit present.      Mental Status: He is alert and oriented to person, place, and time.      GCS: GCS eye subscore is 4. GCS verbal subscore is 5. GCS motor subscore is 6.      Cranial Nerves: Cranial nerves 2-12 are intact.      Sensory: Sensation is intact.      Motor: Motor function is intact.      Coordination: Coordination is intact.      Deep Tendon Reflexes:      Reflex Scores:       Tricep reflexes are 2+ on the right side and 2+ on the left side.       Bicep reflexes are 2+ on the right side and 2+ on the left side.       Brachioradialis reflexes are 2+ on the right side and 2+ on the left side.       Patellar  reflexes are 2+ on the right side and 2+ on the left side.       Achilles reflexes are 2+ on the right side and 2+ on the left side.     Comments: Gait not assessed at this time.    Psychiatric:         Mood and Affect: Mood normal.         Behavior: Behavior normal.         Thought Content: Thought content normal.         Judgment: Judgment normal.       Medications  Current Facility-Administered Medications   Medication Dose Route Frequency Provider Last Rate Last Admin    labetalol (NORMODYNE/TRANDATE) injection 10-20 mg  10-20 mg Intravenous Q4HRS PRN SAWYER Peña        levETIRAcetam (KEPPRA) tablet 500 mg  500 mg Oral BID Yossi Williamson M.D.   500 mg at 12/06/22 0535    dexamethasone (DECADRON) injection 4 mg  4 mg Intravenous Q6HRS Yossi Williamson M.D.   4 mg at 12/06/22 0535    losartan (COZAAR) tablet 50 mg  50 mg Oral BID Yossi Williamson M.D.   50 mg at 12/06/22 0534    senna-docusate (PERICOLACE or SENOKOT S) 8.6-50 MG per tablet 2 Tablet  2 Tablet Oral BID Yossi Williamson M.D.   2 Tablet at 12/06/22 0534    And    polyethylene glycol/lytes (MIRALAX) PACKET 1 Packet  1 Packet Oral QDAY PRN Yossi Williamson M.D.        And    magnesium hydroxide (MILK OF MAGNESIA) suspension 30 mL  30 mL Oral QDAY PRN Yossi Williamson M.D.        And    bisacodyl (DULCOLAX) suppository 10 mg  10 mg Rectal QDAY PRN Yossi Williamson M.D.        Respiratory Therapy Consult   Nebulization Continuous RT Yossi Williamson M.D.        acetaminophen (Tylenol) tablet 650 mg  650 mg Oral Q6HRS PRN Yossi Williamson M.D.   650 mg at 12/06/22 0230    ondansetron (ZOFRAN) syringe/vial injection 4 mg  4 mg Intravenous Q4HRS PRN Yossi Williamson M.D.        ondansetron (ZOFRAN ODT) dispertab 4 mg  4 mg Oral Q4HRS PRN Yossi Williamson M.D.        promethazine (PHENERGAN) tablet 12.5-25 mg  12.5-25 mg Oral Q4HRS PRN Yossi Williamson M.D.        promanaazine  (PHENERGAN) suppository 12.5-25 mg  12.5-25 mg Rectal Q4HRS PRN Yossi Williamson M.D.        prochlorperazine (COMPAZINE) injection 5-10 mg  5-10 mg Intravenous Q4HRS PRN Yossi Williamson M.D.        hydrALAZINE (APRESOLINE) injection 10 mg  10 mg Intravenous Q4HRS PRN BRANDON PeñaRDENIA           Fluids    Intake/Output Summary (Last 24 hours) at 12/6/2022 0813  Last data filed at 12/6/2022 0400  Gross per 24 hour   Intake 1420 ml   Output 1900 ml   Net -480 ml       Laboratory          Recent Labs     12/04/22  0345 12/05/22  0625 12/06/22  0450   SODIUM 135 133* 132*   POTASSIUM 4.6 4.2 4.4   CHLORIDE 101 98 98   CO2 25 25 23   BUN 23* 22 24*   CREATININE 0.67 0.65 0.63   MAGNESIUM  --   --  2.2   PHOSPHORUS  --   --  4.2   CALCIUM 9.0 9.0 8.8     Recent Labs     12/04/22  0345 12/05/22  0625 12/06/22  0450   ALTSGPT 13 14 12   ASTSGOT 7* 9* 8*   ALKPHOSPHAT 91 89 84   TBILIRUBIN 0.5 1.0 1.0   GLUCOSE 180* 166* 166*     Recent Labs     12/04/22  0345 12/05/22  0625 12/06/22  0450   WBC  --   --  11.1*   ASTSGOT 7* 9* 8*   ALTSGPT 13 14 12   ALKPHOSPHAT 91 89 84   TBILIRUBIN 0.5 1.0 1.0     Recent Labs     12/06/22  0450   RBC 5.03   HEMOGLOBIN 15.0   HEMATOCRIT 43.9   PLATELETCT 184       Imaging  CT:    Head without contrast performed on 11/30/2022 as read by Dr. Jatinder Salazar M.D.:  Occipital bone lesion, occipital mass with adjacent white matter changes and mass effect of the frontal horns to the right. Further evaluation with MRI is recommended.  CT Chest/Abd/Pelvis with contrast performed on 11/30/2022 as read by Dr. Aby Rodríguez M.D.:  1.  No significant abnormality in thorax, abdomen and pelvis CT scan.  2.  Hepatomegaly  3.  Cholelithiasis  4.  Segment 2 duodenal diverticula  5.  Atherosclerosis  6.  Fat-containing left inguinal hernia  7.  Fat-containing umbilical hernia  8.  Diverticulosis  9.  Main pulmonary artery dilatation, appearance suggests changes related to pulmonary artery  aneurysm or pulmonary hypertension.    MRI:   MRI-[facility] Brain with & without contrast as read by Dr. Vamsi Maldonado M.D.:    There is an approximately 6.7 x 4.7 x 5.9 cm sized enhancing extra-axial mass noted in the bilateral mid posterior parietal region along the both sides of posterior falx cerebri. The superior sagittal sinus is infiltrated and surrounded by this tumor.   There is also abnormal adjacent dural enhancement .The adjacent parietal/occipital bones are infiltrated by this tumor. There is also hyperostosis of the calvarial surface of the bones with enhancement. There are multiple enlarged blood vessels are noted   along the anterior margin of the lesion. There is diffuse white matter edema noted in the bilateral parietal and occipital lobes surrounding the lesion.There is an approximately 6 mm midline shift towards right side. Findings likely represent   infiltrating vascular high-grade meningioma with superior sagittal sinus and bone infiltration. The other differential diagnosis includes hemangiopericytoma and dural based metastasis.    Assessment/Plan  * Brain mass- (present on admission)  Assessment & Plan  -Patient presented to outside facility with headache x2 months and reported occipital enlargement over the last 4 years  -Initial visual obscuration right sided minimal vision loss initially documented not currently present  -No family history of cancer  -MRI brain here showed 6.7 x 4.7 x 5.9 cm size enhancing extra-axial mass noted bilateral mid posterior parietal region with 6 mm midline shift  -Neurosurgery consulted and notes mass appears to be consistent with likely aggressive meningioma  - Continue Keppra 500 mg PO BID and Dexamethasone 4 mg IV Q6 hrs.  -Pt to be NPO after midnight for OR tomorrow, appears to be scheduled for 11:45 A.M.  -Change neuro assessment frequency from Q2hrs to Q4 hrs today    Hyponatremia- (present on admission)  Assessment & Plan  -In association with  hyperglycemia likely secondary to steroid use  -Mild with current level today at 132  -Follow BMPs    Hyperglycemia- (present on admission)  Assessment & Plan  -Likely secondary to steroids use  -no known history of DM2  -A1c 6.4 on 11/30  -Continue monitoring, goal 140-180    Hypertension- (present on admission)  Assessment & Plan  -Appears well-controlled  -Continue home medication regimen of Losartan 50 mg PO BID  -Hydralazine 10 mg IV Q4 hrs PRN for SBP >160 mmHg and Labetalol 10-20mg IV Q4 hrs PRN for SBP > 160 nnHg as long as HR is > 60 bpm for as needed hypertension management already ordered and to be continued.         VTE:  Contraindicated- high risk for bleeding, SCDs in use  Ulcer: Not Indicated  Lines: None    I have performed a physical exam and reviewed and updated ROS and Plan today (12/6/2022). In review of yesterday's note (12/5/2022), there are no changes except as documented above.     Discussed patient condition and risk of morbidity and/or mortality with Patient,  and during rounds with APRN preceptor, Mandie Samson, and attending, Dr. Sidhu.

## 2022-12-06 NOTE — PROGRESS NOTES
"Critical Care Progress Note    Date of admission  11/30/2022    Chief Complaint  Headaches    Hospital Course  Ambrosio Hardy is a 57-year-old male who presented 11/30/2022 with headache x2 months.  He describes the pain located in the back of his head 6-7/10.  He presented to [facility] in [facility] where CT showed occipital mass with shift.  He was sent here for neurosurgery evaluation.  He was evaluated by Dr. Donis who started him on Decadron and Keppra.  CT chest/abdomen/pelvis showed no evidence of metastasis.  Dr. Donis plans on surgery Wednesday 12/7 and recommended preoperative angiogram for possible embolization.  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  These vessels were embolized with 150-300 UM particles and Gelfoam\" (Dr. Mcdonald procedure note 12/5).     Interval Problem Update  No acute events overnight  No focal deficits. A/O x4, intermittent blurry vision reported today.  Baseline mild headache.  Afebrile  SBP's 110s 120s  SB/SR 57-97  Regular diet - BM 12/3. NPO after midnight for planned surgical procedure tomorrow  II/O: 1400/1900  PIV's, voiding  Mobility 3B    Review of Systems  Review of Systems   Constitutional:  Negative for chills and fever.   HENT:  Negative for hearing loss and tinnitus.    Eyes:  Positive for blurred vision (comes and goes, mild). Negative for double vision and photophobia.   Respiratory:  Negative for cough and shortness of breath.    Cardiovascular:  Negative for chest pain and palpitations.   Gastrointestinal:  Negative for abdominal pain, nausea and vomiting.   Genitourinary:  Negative for dysuria, frequency and urgency.   Musculoskeletal:  Negative for back pain, myalgias and neck pain.   Neurological:  Positive for headaches. Negative for sensory change, speech change and focal weakness.   Psychiatric/Behavioral:  Negative for memory loss. The patient is not nervous/anxious and does not have " insomnia.    All other systems reviewed and are negative.     Vital Signs for last 24 hours   Temp:  [36.2 °C (97.2 °F)-37.1 °C (98.7 °F)] 36.8 °C (98.2 °F)  Pulse:  [55-97] 97  Resp:  [12-69] 16  BP: (106-127)/(61-75) 109/67  SpO2:  [92 %-97 %] 97 %    Hemodynamic parameters for last 24 hours       Respiratory Information for the last 24 hours       Physical Exam   Physical Exam  Vitals and nursing note reviewed.   Constitutional:       General: He is not in acute distress.     Appearance: Normal appearance. He is well-developed. He is not ill-appearing or toxic-appearing.   HENT:      Head: Normocephalic.      Right Ear: Hearing normal.      Left Ear: Hearing normal.      Nose: Nose normal.      Mouth/Throat:      Lips: Pink.      Mouth: Mucous membranes are moist.   Eyes:      Pupils: Pupils are equal, round, and reactive to light.      Visual Fields: Right eye visual fields normal and left eye visual fields normal.   Cardiovascular:      Rate and Rhythm: Normal rate and regular rhythm.      Pulses: Normal pulses.      Heart sounds: Normal heart sounds.   Pulmonary:      Effort: Pulmonary effort is normal.      Breath sounds: Normal breath sounds. No wheezing.   Abdominal:      General: Bowel sounds are normal.      Palpations: Abdomen is soft.      Tenderness: There is no abdominal tenderness. There is no guarding or rebound.   Musculoskeletal:      Right lower leg: No edema.      Left lower leg: No edema.      Comments: CHAVARRIA 5/5   Skin:     General: Skin is warm and dry.      Capillary Refill: Capillary refill takes less than 2 seconds.   Neurological:      General: No focal deficit present.      Mental Status: He is alert and oriented to person, place, and time.      GCS: GCS eye subscore is 4. GCS verbal subscore is 5. GCS motor subscore is 6.      Cranial Nerves: Cranial nerves 2-12 are intact.      Sensory: Sensation is intact.      Motor: Motor function is intact.      Coordination: Coordination is intact.    Psychiatric:         Mood and Affect: Mood normal.         Speech: Speech normal.         Behavior: Behavior is cooperative.         Cognition and Memory: Cognition normal.         Judgment: Judgment normal.       Medications  Current Facility-Administered Medications   Medication Dose Route Frequency Provider Last Rate Last Admin    labetalol (NORMODYNE/TRANDATE) injection 10-20 mg  10-20 mg Intravenous Q4HRS PRN SAWYER Peña        levETIRAcetam (KEPPRA) tablet 500 mg  500 mg Oral BID Yossi Williamson M.D.   500 mg at 12/06/22 0535    dexamethasone (DECADRON) injection 4 mg  4 mg Intravenous Q6HRS Yossi Williamson M.D.   4 mg at 12/06/22 1224    losartan (COZAAR) tablet 50 mg  50 mg Oral BID Yossi Williamson M.D.   50 mg at 12/06/22 0534    senna-docusate (PERICOLACE or SENOKOT S) 8.6-50 MG per tablet 2 Tablet  2 Tablet Oral BID Yossi Williamson M.D.   2 Tablet at 12/06/22 0534    And    polyethylene glycol/lytes (MIRALAX) PACKET 1 Packet  1 Packet Oral QDAY PRN Yossi Williamson M.D.        And    magnesium hydroxide (MILK OF MAGNESIA) suspension 30 mL  30 mL Oral QDAY PRN Yossi Williamson M.D.        And    bisacodyl (DULCOLAX) suppository 10 mg  10 mg Rectal QDAY PRN Yossi Williamson M.D.        Respiratory Therapy Consult   Nebulization Continuous RT Yossi Williamson M.D.        acetaminophen (Tylenol) tablet 650 mg  650 mg Oral Q6HRS PRN Yossi Williamson M.D.   650 mg at 12/06/22 0230    ondansetron (ZOFRAN) syringe/vial injection 4 mg  4 mg Intravenous Q4HRS PRN Yossi Williamson M.D.        ondansetron (ZOFRAN ODT) dispertab 4 mg  4 mg Oral Q4HRS PRN Yossi Williamson M.D.        promethazine (PHENERGAN) tablet 12.5-25 mg  12.5-25 mg Oral Q4HRS PRN Yossi Williamson M.D.        promethazine (PHENERGAN) suppository 12.5-25 mg  12.5-25 mg Rectal Q4HRS PRN Yossi Williamson M.D.        prochlorperazine (COMPAZINE) injection 5-10 mg  5-10  mg Intravenous Q4HRS PRN Yossi Williamson M.D.        hydrALAZINE (APRESOLINE) injection 10 mg  10 mg Intravenous Q4HRS PRN SAWYER Peña           Fluids    Intake/Output Summary (Last 24 hours) at 12/6/2022 1256  Last data filed at 12/6/2022 1200  Gross per 24 hour   Intake 720 ml   Output 2700 ml   Net -1980 ml       Laboratory          Recent Labs     12/04/22  0345 12/05/22  0625 12/06/22  0450   SODIUM 135 133* 132*   POTASSIUM 4.6 4.2 4.4   CHLORIDE 101 98 98   CO2 25 25 23   BUN 23* 22 24*   CREATININE 0.67 0.65 0.63   MAGNESIUM  --   --  2.2   PHOSPHORUS  --   --  4.2   CALCIUM 9.0 9.0 8.8     Recent Labs     12/04/22  0345 12/05/22  0625 12/06/22  0450   ALTSGPT 13 14 12   ASTSGOT 7* 9* 8*   ALKPHOSPHAT 91 89 84   TBILIRUBIN 0.5 1.0 1.0   GLUCOSE 180* 166* 166*     Recent Labs     12/04/22  0345 12/05/22  0625 12/06/22  0450   WBC  --   --  11.1*   ASTSGOT 7* 9* 8*   ALTSGPT 13 14 12   ALKPHOSPHAT 91 89 84   TBILIRUBIN 0.5 1.0 1.0     Recent Labs     12/06/22  0450   RBC 5.03   HEMOGLOBIN 15.0   HEMATOCRIT 43.9   PLATELETCT 184       Imaging  X-Ray:  I have personally reviewed the images and compared with prior images.  EKG:  I have personally reviewed the images and compared with prior images.    Assessment/Plan  * Brain mass- (present on admission)  Assessment & Plan  -Patient presented to outside facility with headache x2 months  -No focal neurodeficits  -No family history of cancer  -MRI brain here showed 6.7 x 4.7 x 5.9 cm size enhancing extra-axial mass noted bilateral mid posterior parietal region with 6 mm midline shift  -Neurosurgery consulted and started on Keppra and steroids.  Plan for surgical intervention 12/7  -To IR for angio today    Hyponatremia- (present on admission)  Assessment & Plan  -With hyperglycemia  -Mild  -Follow BMP    Hyperglycemia- (present on admission)  Assessment & Plan  -On steroids, no known history of DM2  -A1c 6.4  -Monitor -goal  140-180    Hypertension- (present on admission)  Assessment & Plan  -On losartan as outpatient  -As needed IV antihypertensives with parameters       VTE:  Contraindicated  Ulcer: Not Indicated  Lines: None    I have performed a physical exam and reviewed and updated ROS and Plan today (12/6/2022). In review of yesterday's note (12/5/2022), there are no changes except as documented above.     Discussed patient condition and risk of morbidity and/or mortality with RN, RT, Pharmacy, Patient, neurosurgery, and my attending Dr. Sidhu  The patient remains critically ill.  Critical care time = 33 minutes in directly providing and coordinating critical care and extensive data review.  No time overlap and excludes procedures.    Please note that this dictation was created using voice recognition software. I have made every reasonable attempt to correct obvious errors, but there may be errors of grammar and possibly content that I did not discover before finalizing the note.    YENI Peña.

## 2022-12-06 NOTE — PROGRESS NOTES
Neurosurgery Progress Note    Subjective:  No acute events over night   Reports feeling/doing well      Exam:  A/Ox4  CRISTY CHAVARRIA no focal deficitis          BP  Min: 106/63  Max: 127/75  Pulse  Av.8  Min: 55  Max: 97  Resp  Av.2  Min: 12  Max: 69  Temp  Av.6 °C (97.9 °F)  Min: 36.2 °C (97.2 °F)  Max: 37.1 °C (98.7 °F)  SpO2  Av.8 %  Min: 92 %  Max: 97 %    No data recorded    Recent Labs     22  0450   WBC 11.1*   RBC 5.03   HEMOGLOBIN 15.0   HEMATOCRIT 43.9   MCV 87.3   MCH 29.8   MCHC 34.2   RDW 43.6   PLATELETCT 184   MPV 9.4       Recent Labs     22  0345 22  0625 22  0450   SODIUM 135 133* 132*   POTASSIUM 4.6 4.2 4.4   CHLORIDE 101 98 98   CO2 25 25 23   GLUCOSE 180* 166* 166*   BUN 23* 22 24*   CREATININE 0.67 0.65 0.63   CALCIUM 9.0 9.0 8.8                   Intake/Output                         22 0700 - 22 0659 22 0700 - 22 0659     [id number] 0470-3821 Total 3019-8949 8145-5014 Total                 Intake    P.O.  --  720 720  --  -- --    P.O. -- 720 720 -- -- --    I.V.  700  -- 700  --  -- --    Volume (mL) (electrolyte-A (PLASMALYTE-A) infusion) 700 -- 700 -- -- --    Total Intake  -- -- --       Output    Urine  1100  800 1900  800  -- 800    Number of Times Voided 2 x 2 x 4 x 2 x -- 2 x    Urine Void (mL) 5210 811 9305 800 -- 800    Stool  --  -- --  --  -- --    Number of Times Stooled 0 x 0 x 0 x 0 x -- 0 x    Total Output 5076 555 8997 800 -- 800       Net I/O     -400 -80 -480 -800 -- -800              Intake/Output Summary (Last 24 hours) at 2022 1435  Last data filed at 2022 1200  Gross per 24 hour   Intake 720 ml   Output 2200 ml   Net -1480 ml               labetalol  10-20 mg Q4HRS PRN    levETIRAcetam  500 mg BID    dexamethasone  4 mg Q6HRS    losartan  50 mg BID    senna-docusate  2 Tablet BID    And    polyethylene glycol/lytes  1 Packet QDAY PRN    And    magnesium hydroxide  30 mL QDAY PRN     And    bisacodyl  10 mg QDAY PRN    Respiratory Therapy Consult   Continuous RT    acetaminophen  650 mg Q6HRS PRN    ondansetron  4 mg Q4HRS PRN    ondansetron  4 mg Q4HRS PRN    promethazine  12.5-25 mg Q4HRS PRN    promethazine  12.5-25 mg Q4HRS PRN    prochlorperazine  5-10 mg Q4HRS PRN    hydrALAZINE  10 mg Q4HRS PRN       Assessment and Plan:  Hospital day  6 hypervascular heterogenous enhancing mass based off of the skull on the dura causing significant either invasion or expansion of the skull and encasement or invasion into the superior sagittal sinus distal to the torcula there is also hypervascularity of the cortical surface in the left occipital lobe.  POD #na  Prophylactic anticoagulation: ambulating    12/5 IR embolization of bilateral MMA and right occipital artery    Keppra 500mg BID  On steroids  OR tmrw with Dr. Donis  NPO at [place]  Preop labs ordered  MRI stealth completed

## 2022-12-07 ENCOUNTER — APPOINTMENT (OUTPATIENT)
Dept: RADIOLOGY | Facility: MEDICAL CENTER | Age: 57
DRG: 025 | End: 2022-12-07
Attending: STUDENT IN AN ORGANIZED HEALTH CARE EDUCATION/TRAINING PROGRAM
Payer: COMMERCIAL

## 2022-12-07 ENCOUNTER — ANESTHESIA EVENT (OUTPATIENT)
Dept: SURGERY | Facility: MEDICAL CENTER | Age: 57
DRG: 025 | End: 2022-12-07
Payer: COMMERCIAL

## 2022-12-07 ENCOUNTER — APPOINTMENT (OUTPATIENT)
Dept: RADIOLOGY | Facility: MEDICAL CENTER | Age: 57
DRG: 025 | End: 2022-12-07
Attending: NURSE PRACTITIONER
Payer: COMMERCIAL

## 2022-12-07 ENCOUNTER — ANESTHESIA (OUTPATIENT)
Dept: SURGERY | Facility: MEDICAL CENTER | Age: 57
DRG: 025 | End: 2022-12-07
Payer: COMMERCIAL

## 2022-12-07 PROBLEM — Z99.11 ON MECHANICALLY ASSISTED VENTILATION (HCC): Status: ACTIVE | Noted: 2022-12-07

## 2022-12-07 LAB
ABO + RH BLD: NORMAL
ABO GROUP BLD: NORMAL
ALBUMIN SERPL BCP-MCNC: 3.5 G/DL (ref 3.2–4.9)
ALBUMIN/GLOB SERPL: 1.7 G/DL
ALP SERPL-CCNC: 81 U/L (ref 30–99)
ALT SERPL-CCNC: 12 U/L (ref 2–50)
ANION GAP SERPL CALC-SCNC: 11 MMOL/L (ref 7–16)
AST SERPL-CCNC: 12 U/L (ref 12–45)
BARCODED ABORH UBTYP: 5100
BARCODED ABORH UBTYP: 6200
BARCODED ABORH UBTYP: 7300
BARCODED ABORH UBTYP: 8400
BARCODED PRD CODE UBPRD: NORMAL
BARCODED UNIT NUM UBUNT: NORMAL
BASE EXCESS BLDA CALC-SCNC: -2 MMOL/L (ref -4–3)
BASE EXCESS BLDA CALC-SCNC: -3 MMOL/L (ref -4–3)
BILIRUB SERPL-MCNC: 0.9 MG/DL (ref 0.1–1.5)
BLD GP AB SCN SERPL QL: NORMAL
BODY TEMPERATURE: ABNORMAL DEGREES
BODY TEMPERATURE: ABNORMAL DEGREES
BREATHS SETTING VENT: 16
BUN SERPL-MCNC: 22 MG/DL (ref 8–22)
CA-I BLD ISE-SCNC: 1.03 MMOL/L (ref 1.1–1.3)
CALCIUM SERPL-MCNC: 8.8 MG/DL (ref 8.5–10.5)
CHLORIDE SERPL-SCNC: 101 MMOL/L (ref 96–112)
CO2 BLDA-SCNC: 23 MMOL/L (ref 20–33)
CO2 BLDA-SCNC: 26 MMOL/L (ref 20–33)
CO2 SERPL-SCNC: 21 MMOL/L (ref 20–33)
COMPONENT F 8504F: NORMAL
COMPONENT R 8504R: NORMAL
CREAT SERPL-MCNC: 0.57 MG/DL (ref 0.5–1.4)
DELSYS IDSYS: ABNORMAL
ERYTHROCYTE [DISTWIDTH] IN BLOOD BY AUTOMATED COUNT: 44.6 FL (ref 35.9–50)
GFR SERPLBLD CREATININE-BSD FMLA CKD-EPI: 114 ML/MIN/1.73 M 2
GLOBULIN SER CALC-MCNC: 2.1 G/DL (ref 1.9–3.5)
GLUCOSE SERPL-MCNC: 160 MG/DL (ref 65–99)
HCO3 BLDA-SCNC: 22.3 MMOL/L (ref 17–25)
HCO3 BLDA-SCNC: 24.3 MMOL/L (ref 17–25)
HCT VFR BLD AUTO: 42.5 % (ref 42–52)
HCT VFR BLD CALC: 41 % (ref 42–52)
HGB BLD-MCNC: 13.9 G/DL (ref 14–18)
HGB BLD-MCNC: 14.7 G/DL (ref 14–18)
HOROWITZ INDEX BLDA+IHG-RTO: 400 MM[HG]
MAGNESIUM SERPL-MCNC: 2.1 MG/DL (ref 1.5–2.5)
MCH RBC QN AUTO: 30.6 PG (ref 27–33)
MCHC RBC AUTO-ENTMCNC: 34.6 G/DL (ref 33.7–35.3)
MCV RBC AUTO: 88.5 FL (ref 81.4–97.8)
MODE IMODE: ABNORMAL
O2/TOTAL GAS SETTING VFR VENT: 100 %
PCO2 BLDA: 34.7 MMHG (ref 26–37)
PCO2 BLDA: 51.6 MMHG (ref 26–37)
PCO2 TEMP ADJ BLDA: 33.2 MMHG (ref 26–37)
PEEP END EXPIRATORY PRESSURE IPEEP: 8 CMH20
PH BLDA: 7.28 [PH] (ref 7.4–7.5)
PH BLDA: 7.42 [PH] (ref 7.4–7.5)
PH TEMP ADJ BLDA: 7.43 [PH] (ref 7.4–7.5)
PHOSPHATE SERPL-MCNC: 3.6 MG/DL (ref 2.5–4.5)
PLATELET # BLD AUTO: 145 K/UL (ref 164–446)
PMV BLD AUTO: 9.7 FL (ref 9–12.9)
PO2 BLDA: 396 MMHG (ref 64–87)
PO2 BLDA: 400 MMHG (ref 64–87)
PO2 TEMP ADJ BLDA: 394 MMHG (ref 64–87)
POTASSIUM BLD-SCNC: 4.7 MMOL/L (ref 3.6–5.5)
POTASSIUM SERPL-SCNC: 4.4 MMOL/L (ref 3.6–5.5)
PRODUCT TYPE UPROD: NORMAL
PROT SERPL-MCNC: 5.6 G/DL (ref 6–8.2)
RBC # BLD AUTO: 4.8 M/UL (ref 4.7–6.1)
RH BLD: NORMAL
SAO2 % BLDA: 100 % (ref 93–99)
SAO2 % BLDA: 100 % (ref 93–99)
SODIUM BLD-SCNC: 134 MMOL/L (ref 135–145)
SODIUM SERPL-SCNC: 133 MMOL/L (ref 135–145)
SPECIMEN DRAWN FROM PATIENT: ABNORMAL
SPECIMEN DRAWN FROM PATIENT: ABNORMAL
TIDAL VOLUME IVT: 440 ML
UNIT STATUS USTAT: NORMAL
WBC # BLD AUTO: 9.6 K/UL (ref 4.8–10.8)

## 2022-12-07 PROCEDURE — 95939 C MOTOR EVOKED UPR&LWR LIMBS: CPT | Performed by: NEUROLOGICAL SURGERY

## 2022-12-07 PROCEDURE — 30233N1 TRANSFUSION OF NONAUTOLOGOUS RED BLOOD CELLS INTO PERIPHERAL VEIN, PERCUTANEOUS APPROACH: ICD-10-PCS | Performed by: ANESTHESIOLOGY

## 2022-12-07 PROCEDURE — 8E09XBH COMPUTER ASSISTED PROCEDURE OF HEAD AND NECK REGION, WITH MAGNETIC RESONANCE IMAGING: ICD-10-PCS | Performed by: NEUROLOGICAL SURGERY

## 2022-12-07 PROCEDURE — 700111 HCHG RX REV CODE 636 W/ 250 OVERRIDE (IP): Performed by: INTERNAL MEDICINE

## 2022-12-07 PROCEDURE — 700101 HCHG RX REV CODE 250: Performed by: ANESTHESIOLOGY

## 2022-12-07 PROCEDURE — 71045 X-RAY EXAM CHEST 1 VIEW: CPT

## 2022-12-07 PROCEDURE — 700111 HCHG RX REV CODE 636 W/ 250 OVERRIDE (IP): Performed by: HOSPITALIST

## 2022-12-07 PROCEDURE — 700111 HCHG RX REV CODE 636 W/ 250 OVERRIDE (IP): Performed by: STUDENT IN AN ORGANIZED HEALTH CARE EDUCATION/TRAINING PROGRAM

## 2022-12-07 PROCEDURE — 85014 HEMATOCRIT: CPT

## 2022-12-07 PROCEDURE — 00B10ZZ EXCISION OF CEREBRAL MENINGES, OPEN APPROACH: ICD-10-PCS | Performed by: NEUROLOGICAL SURGERY

## 2022-12-07 PROCEDURE — 700101 HCHG RX REV CODE 250: Performed by: PHYSICIAN ASSISTANT

## 2022-12-07 PROCEDURE — 700105 HCHG RX REV CODE 258: Performed by: PHYSICIAN ASSISTANT

## 2022-12-07 PROCEDURE — 00210 ANES INTRACRANIAL PX NOS: CPT | Performed by: ANESTHESIOLOGY

## 2022-12-07 PROCEDURE — 36556 INSERT NON-TUNNEL CV CATH: CPT

## 2022-12-07 PROCEDURE — 84100 ASSAY OF PHOSPHORUS: CPT

## 2022-12-07 PROCEDURE — P9016 RBC LEUKOCYTES REDUCED: HCPCS | Mod: 91

## 2022-12-07 PROCEDURE — 88342 IMHCHEM/IMCYTCHM 1ST ANTB: CPT

## 2022-12-07 PROCEDURE — A9270 NON-COVERED ITEM OR SERVICE: HCPCS | Performed by: NEUROLOGICAL SURGERY

## 2022-12-07 PROCEDURE — 110371 HCHG SHELL REV 272: Performed by: NEUROLOGICAL SURGERY

## 2022-12-07 PROCEDURE — 0NR30JZ REPLACEMENT OF RIGHT PARIETAL BONE WITH SYNTHETIC SUBSTITUTE, OPEN APPROACH: ICD-10-PCS | Performed by: NEUROLOGICAL SURGERY

## 2022-12-07 PROCEDURE — C1713 ANCHOR/SCREW BN/BN,TIS/BN: HCPCS | Performed by: NEUROLOGICAL SURGERY

## 2022-12-07 PROCEDURE — 160031 HCHG SURGERY MINUTES - 1ST 30 MINS LEVEL 5: Performed by: NEUROLOGICAL SURGERY

## 2022-12-07 PROCEDURE — 110454 HCHG SHELL REV 250: Performed by: NEUROLOGICAL SURGERY

## 2022-12-07 PROCEDURE — 502000 HCHG MISC OR IMPLANTS RC 0278: Performed by: NEUROLOGICAL SURGERY

## 2022-12-07 PROCEDURE — 94799 UNLISTED PULMONARY SVC/PX: CPT

## 2022-12-07 PROCEDURE — 88307 TISSUE EXAM BY PATHOLOGIST: CPT

## 2022-12-07 PROCEDURE — 83735 ASSAY OF MAGNESIUM: CPT

## 2022-12-07 PROCEDURE — 160009 HCHG ANES TIME/MIN: Performed by: NEUROLOGICAL SURGERY

## 2022-12-07 PROCEDURE — 37799 UNLISTED PX VASCULAR SURGERY: CPT

## 2022-12-07 PROCEDURE — 84132 ASSAY OF SERUM POTASSIUM: CPT

## 2022-12-07 PROCEDURE — 86923 COMPATIBILITY TEST ELECTRIC: CPT

## 2022-12-07 PROCEDURE — 86850 RBC ANTIBODY SCREEN: CPT

## 2022-12-07 PROCEDURE — 95867 NDL EMG CRANIAL NRV MUSC UNI: CPT | Performed by: NEUROLOGICAL SURGERY

## 2022-12-07 PROCEDURE — 160048 HCHG OR STATISTICAL LEVEL 1-5: Performed by: NEUROLOGICAL SURGERY

## 2022-12-07 PROCEDURE — 95938 SOMATOSENSORY TESTING: CPT | Performed by: NEUROLOGICAL SURGERY

## 2022-12-07 PROCEDURE — 95937 NEUROMUSCULAR JUNCTION TEST: CPT | Performed by: NEUROLOGICAL SURGERY

## 2022-12-07 PROCEDURE — 36556 INSERT NON-TUNNEL CV CATH: CPT | Mod: RT | Performed by: NURSE PRACTITIONER

## 2022-12-07 PROCEDURE — 02HV33Z INSERTION OF INFUSION DEVICE INTO SUPERIOR VENA CAVA, PERCUTANEOUS APPROACH: ICD-10-PCS | Performed by: STUDENT IN AN ORGANIZED HEALTH CARE EDUCATION/TRAINING PROGRAM

## 2022-12-07 PROCEDURE — 82803 BLOOD GASES ANY COMBINATION: CPT | Mod: 91

## 2022-12-07 PROCEDURE — 700111 HCHG RX REV CODE 636 W/ 250 OVERRIDE (IP): Performed by: ANESTHESIOLOGY

## 2022-12-07 PROCEDURE — 95940 IONM IN OPERATNG ROOM 15 MIN: CPT | Performed by: NEUROLOGICAL SURGERY

## 2022-12-07 PROCEDURE — 84295 ASSAY OF SERUM SODIUM: CPT

## 2022-12-07 PROCEDURE — 36620 INSERTION CATHETER ARTERY: CPT | Performed by: ANESTHESIOLOGY

## 2022-12-07 PROCEDURE — 700101 HCHG RX REV CODE 250: Performed by: NEUROLOGICAL SURGERY

## 2022-12-07 PROCEDURE — 95864 NEEDLE EMG 4 EXTREMITIES: CPT | Performed by: NEUROLOGICAL SURGERY

## 2022-12-07 PROCEDURE — 30233K1 TRANSFUSION OF NONAUTOLOGOUS FROZEN PLASMA INTO PERIPHERAL VEIN, PERCUTANEOUS APPROACH: ICD-10-PCS | Performed by: ANESTHESIOLOGY

## 2022-12-07 PROCEDURE — 160042 HCHG SURGERY MINUTES - EA ADDL 1 MIN LEVEL 5: Performed by: NEUROLOGICAL SURGERY

## 2022-12-07 PROCEDURE — 82330 ASSAY OF CALCIUM: CPT

## 2022-12-07 PROCEDURE — C1751 CATH, INF, PER/CENT/MIDLINE: HCPCS

## 2022-12-07 PROCEDURE — 700102 HCHG RX REV CODE 250 W/ 637 OVERRIDE(OP): Performed by: NEUROLOGICAL SURGERY

## 2022-12-07 PROCEDURE — 36430 TRANSFUSION BLD/BLD COMPNT: CPT

## 2022-12-07 PROCEDURE — A9270 NON-COVERED ITEM OR SERVICE: HCPCS | Performed by: HOSPITALIST

## 2022-12-07 PROCEDURE — 93005 ELECTROCARDIOGRAM TRACING: CPT | Performed by: STUDENT IN AN ORGANIZED HEALTH CARE EDUCATION/TRAINING PROGRAM

## 2022-12-07 PROCEDURE — 85027 COMPLETE CBC AUTOMATED: CPT

## 2022-12-07 PROCEDURE — 80053 COMPREHEN METABOLIC PANEL: CPT

## 2022-12-07 PROCEDURE — 94003 VENT MGMT INPAT SUBQ DAY: CPT

## 2022-12-07 PROCEDURE — 700105 HCHG RX REV CODE 258: Performed by: ANESTHESIOLOGY

## 2022-12-07 PROCEDURE — 770022 HCHG ROOM/CARE - ICU (200)

## 2022-12-07 PROCEDURE — 86901 BLOOD TYPING SEROLOGIC RH(D): CPT

## 2022-12-07 PROCEDURE — 700111 HCHG RX REV CODE 636 W/ 250 OVERRIDE (IP): Performed by: NEUROLOGICAL SURGERY

## 2022-12-07 PROCEDURE — 700111 HCHG RX REV CODE 636 W/ 250 OVERRIDE (IP): Performed by: PHYSICIAN ASSISTANT

## 2022-12-07 PROCEDURE — 700106 HCHG RX REV CODE 271: Performed by: NEUROLOGICAL SURGERY

## 2022-12-07 PROCEDURE — C1725 CATH, TRANSLUMIN NON-LASER: HCPCS | Performed by: NEUROLOGICAL SURGERY

## 2022-12-07 PROCEDURE — 5A1935Z RESPIRATORY VENTILATION, LESS THAN 24 CONSECUTIVE HOURS: ICD-10-PCS | Performed by: STUDENT IN AN ORGANIZED HEALTH CARE EDUCATION/TRAINING PROGRAM

## 2022-12-07 PROCEDURE — 700101 HCHG RX REV CODE 250: Performed by: STUDENT IN AN ORGANIZED HEALTH CARE EDUCATION/TRAINING PROGRAM

## 2022-12-07 PROCEDURE — 88341 IMHCHEM/IMCYTCHM EA ADD ANTB: CPT | Mod: 91

## 2022-12-07 PROCEDURE — 99291 CRITICAL CARE FIRST HOUR: CPT | Performed by: STUDENT IN AN ORGANIZED HEALTH CARE EDUCATION/TRAINING PROGRAM

## 2022-12-07 PROCEDURE — 86900 BLOOD TYPING SEROLOGIC ABO: CPT

## 2022-12-07 PROCEDURE — 00U20KZ SUPPLEMENT DURA MATER WITH NONAUTOLOGOUS TISSUE SUBSTITUTE, OPEN APPROACH: ICD-10-PCS | Performed by: NEUROLOGICAL SURGERY

## 2022-12-07 PROCEDURE — P9017 PLASMA 1 DONOR FRZ W/IN 8 HR: HCPCS | Mod: 91

## 2022-12-07 PROCEDURE — 94002 VENT MGMT INPAT INIT DAY: CPT

## 2022-12-07 PROCEDURE — 700105 HCHG RX REV CODE 258: Performed by: STUDENT IN AN ORGANIZED HEALTH CARE EDUCATION/TRAINING PROGRAM

## 2022-12-07 PROCEDURE — 700102 HCHG RX REV CODE 250 W/ 637 OVERRIDE(OP): Performed by: HOSPITALIST

## 2022-12-07 DEVICE — IMPLANTABLE DEVICE: Type: IMPLANTABLE DEVICE | Site: CRANIAL | Status: FUNCTIONAL

## 2022-12-07 DEVICE — GRAFT DURAMATRIX ONLAY PLUS 3IN X 3IN OR 7.5CM X 7.5CM: Type: IMPLANTABLE DEVICE | Site: CRANIAL | Status: FUNCTIONAL

## 2022-12-07 DEVICE — SEALANT DURAL AUTOSPRAY ADHERUS (5EA/PK): Type: IMPLANTABLE DEVICE | Site: CRANIAL | Status: FUNCTIONAL

## 2022-12-07 DEVICE — SCREW STRYK NC 1.5X5MM (6NCX40=240) (80EA/PK) CONSIGNED QTY 240 PRE-LOAD: Type: IMPLANTABLE DEVICE | Site: CRANIAL | Status: FUNCTIONAL

## 2022-12-07 RX ORDER — OXYCODONE HYDROCHLORIDE 5 MG/1
5 TABLET ORAL
Status: DISCONTINUED | OUTPATIENT
Start: 2022-12-07 | End: 2022-12-15 | Stop reason: HOSPADM

## 2022-12-07 RX ORDER — FAMOTIDINE 20 MG/1
20 TABLET, FILM COATED ORAL EVERY 12 HOURS
Status: DISCONTINUED | OUTPATIENT
Start: 2022-12-07 | End: 2022-12-09

## 2022-12-07 RX ORDER — OXYCODONE HYDROCHLORIDE 10 MG/1
10 TABLET ORAL
Status: DISCONTINUED | OUTPATIENT
Start: 2022-12-07 | End: 2022-12-15 | Stop reason: HOSPADM

## 2022-12-07 RX ORDER — SCOLOPAMINE TRANSDERMAL SYSTEM 1 MG/1
1 PATCH, EXTENDED RELEASE TRANSDERMAL
Status: DISCONTINUED | OUTPATIENT
Start: 2022-12-07 | End: 2022-12-15 | Stop reason: HOSPADM

## 2022-12-07 RX ORDER — LABETALOL HYDROCHLORIDE 5 MG/ML
10 INJECTION, SOLUTION INTRAVENOUS
Status: DISCONTINUED | OUTPATIENT
Start: 2022-12-07 | End: 2022-12-08

## 2022-12-07 RX ORDER — PHENYLEPHRINE HCL IN 0.9% NACL 0.5 MG/5ML
SYRINGE (ML) INTRAVENOUS PRN
Status: DISCONTINUED | OUTPATIENT
Start: 2022-12-07 | End: 2022-12-07

## 2022-12-07 RX ORDER — DEXMEDETOMIDINE HYDROCHLORIDE 4 UG/ML
.1-1.5 INJECTION, SOLUTION INTRAVENOUS CONTINUOUS
Status: DISCONTINUED | OUTPATIENT
Start: 2022-12-07 | End: 2022-12-08

## 2022-12-07 RX ORDER — DIPHENHYDRAMINE HYDROCHLORIDE 50 MG/ML
25 INJECTION INTRAMUSCULAR; INTRAVENOUS EVERY 6 HOURS PRN
Status: DISCONTINUED | OUTPATIENT
Start: 2022-12-07 | End: 2022-12-15 | Stop reason: HOSPADM

## 2022-12-07 RX ORDER — ENEMA 19; 7 G/133ML; G/133ML
1 ENEMA RECTAL
Status: DISCONTINUED | OUTPATIENT
Start: 2022-12-07 | End: 2022-12-15 | Stop reason: HOSPADM

## 2022-12-07 RX ORDER — DEXTROSE MONOHYDRATE, SODIUM CHLORIDE, AND POTASSIUM CHLORIDE 50; 1.49; 9 G/1000ML; G/1000ML; G/1000ML
INJECTION, SOLUTION INTRAVENOUS CONTINUOUS
Status: DISCONTINUED | OUTPATIENT
Start: 2022-12-07 | End: 2022-12-08

## 2022-12-07 RX ORDER — ONDANSETRON 2 MG/ML
4 INJECTION INTRAMUSCULAR; INTRAVENOUS EVERY 4 HOURS PRN
Status: DISCONTINUED | OUTPATIENT
Start: 2022-12-07 | End: 2022-12-15 | Stop reason: HOSPADM

## 2022-12-07 RX ORDER — LEVETIRACETAM 500 MG/5ML
INJECTION, SOLUTION, CONCENTRATE INTRAVENOUS PRN
Status: DISCONTINUED | OUTPATIENT
Start: 2022-12-07 | End: 2022-12-07 | Stop reason: SURG

## 2022-12-07 RX ORDER — AMOXICILLIN 250 MG
1 CAPSULE ORAL NIGHTLY
Status: DISCONTINUED | OUTPATIENT
Start: 2022-12-07 | End: 2022-12-15 | Stop reason: HOSPADM

## 2022-12-07 RX ORDER — CLONIDINE HYDROCHLORIDE 0.1 MG/1
0.1 TABLET ORAL EVERY 4 HOURS PRN
Status: DISCONTINUED | OUTPATIENT
Start: 2022-12-07 | End: 2022-12-08

## 2022-12-07 RX ORDER — HEPARIN SODIUM,PORCINE 1000/ML
VIAL (ML) INJECTION
Status: DISCONTINUED | OUTPATIENT
Start: 2022-12-07 | End: 2022-12-07 | Stop reason: HOSPADM

## 2022-12-07 RX ORDER — POLYETHYLENE GLYCOL 3350 17 G/17G
1 POWDER, FOR SOLUTION ORAL 2 TIMES DAILY PRN
Status: DISCONTINUED | OUTPATIENT
Start: 2022-12-07 | End: 2022-12-15 | Stop reason: HOSPADM

## 2022-12-07 RX ORDER — ACETAMINOPHEN 500 MG
1000 TABLET ORAL EVERY 6 HOURS PRN
Status: DISCONTINUED | OUTPATIENT
Start: 2022-12-12 | End: 2022-12-08

## 2022-12-07 RX ORDER — BISACODYL 10 MG
10 SUPPOSITORY, RECTAL RECTAL
Status: DISCONTINUED | OUTPATIENT
Start: 2022-12-07 | End: 2022-12-15 | Stop reason: HOSPADM

## 2022-12-07 RX ORDER — SODIUM CHLORIDE 9 MG/ML
INJECTION, SOLUTION INTRAVENOUS
Status: DISCONTINUED | OUTPATIENT
Start: 2022-12-07 | End: 2022-12-07 | Stop reason: SURG

## 2022-12-07 RX ORDER — HYDRALAZINE HYDROCHLORIDE 20 MG/ML
10 INJECTION INTRAMUSCULAR; INTRAVENOUS
Status: DISCONTINUED | OUTPATIENT
Start: 2022-12-07 | End: 2022-12-08

## 2022-12-07 RX ORDER — LIDOCAINE HYDROCHLORIDE 20 MG/ML
INJECTION, SOLUTION EPIDURAL; INFILTRATION; INTRACAUDAL; PERINEURAL PRN
Status: DISCONTINUED | OUTPATIENT
Start: 2022-12-07 | End: 2022-12-07 | Stop reason: SURG

## 2022-12-07 RX ORDER — DOCUSATE SODIUM 100 MG/1
100 CAPSULE, LIQUID FILLED ORAL 2 TIMES DAILY
Status: DISCONTINUED | OUTPATIENT
Start: 2022-12-07 | End: 2022-12-15 | Stop reason: HOSPADM

## 2022-12-07 RX ORDER — DEXAMETHASONE SODIUM PHOSPHATE 4 MG/ML
INJECTION, SOLUTION INTRA-ARTICULAR; INTRALESIONAL; INTRAMUSCULAR; INTRAVENOUS; SOFT TISSUE PRN
Status: DISCONTINUED | OUTPATIENT
Start: 2022-12-07 | End: 2022-12-07 | Stop reason: SURG

## 2022-12-07 RX ORDER — NOREPINEPHRINE BITARTRATE 0.03 MG/ML
0-1 INJECTION, SOLUTION INTRAVENOUS CONTINUOUS
Status: DISCONTINUED | OUTPATIENT
Start: 2022-12-07 | End: 2022-12-09

## 2022-12-07 RX ORDER — MAGNESIUM HYDROXIDE 1200 MG/15ML
LIQUID ORAL
Status: COMPLETED | OUTPATIENT
Start: 2022-12-07 | End: 2022-12-07

## 2022-12-07 RX ORDER — BUPIVACAINE HYDROCHLORIDE AND EPINEPHRINE 5; 5 MG/ML; UG/ML
INJECTION, SOLUTION EPIDURAL; INTRACAUDAL; PERINEURAL
Status: DISCONTINUED | OUTPATIENT
Start: 2022-12-07 | End: 2022-12-07 | Stop reason: HOSPADM

## 2022-12-07 RX ORDER — HYDROMORPHONE HYDROCHLORIDE 2 MG/ML
INJECTION, SOLUTION INTRAMUSCULAR; INTRAVENOUS; SUBCUTANEOUS PRN
Status: DISCONTINUED | OUTPATIENT
Start: 2022-12-07 | End: 2022-12-07 | Stop reason: SURG

## 2022-12-07 RX ORDER — ROCURONIUM BROMIDE 10 MG/ML
INJECTION, SOLUTION INTRAVENOUS PRN
Status: DISCONTINUED | OUTPATIENT
Start: 2022-12-07 | End: 2022-12-07 | Stop reason: SURG

## 2022-12-07 RX ORDER — CEFAZOLIN SODIUM 1 G/3ML
INJECTION, POWDER, FOR SOLUTION INTRAMUSCULAR; INTRAVENOUS PRN
Status: DISCONTINUED | OUTPATIENT
Start: 2022-12-07 | End: 2022-12-07 | Stop reason: SURG

## 2022-12-07 RX ORDER — ADENOSINE 3 MG/ML
INJECTION, SOLUTION INTRAVENOUS
Status: COMPLETED
Start: 2022-12-07 | End: 2022-12-08

## 2022-12-07 RX ORDER — PHENYLEPHRINE HYDROCHLORIDE 10 MG/ML
INJECTION, SOLUTION INTRAMUSCULAR; INTRAVENOUS; SUBCUTANEOUS PRN
Status: DISCONTINUED | OUTPATIENT
Start: 2022-12-07 | End: 2022-12-07 | Stop reason: SURG

## 2022-12-07 RX ORDER — HYDROMORPHONE HYDROCHLORIDE 1 MG/ML
0.5 INJECTION, SOLUTION INTRAMUSCULAR; INTRAVENOUS; SUBCUTANEOUS
Status: DISCONTINUED | OUTPATIENT
Start: 2022-12-07 | End: 2022-12-15 | Stop reason: HOSPADM

## 2022-12-07 RX ORDER — ACETAMINOPHEN 500 MG
1000 TABLET ORAL EVERY 6 HOURS
Status: DISCONTINUED | OUTPATIENT
Start: 2022-12-07 | End: 2022-12-08

## 2022-12-07 RX ORDER — MIDAZOLAM HYDROCHLORIDE 1 MG/ML
5 INJECTION INTRAMUSCULAR; INTRAVENOUS ONCE
Status: COMPLETED | OUTPATIENT
Start: 2022-12-08 | End: 2022-12-07

## 2022-12-07 RX ORDER — SODIUM CHLORIDE 9 MG/ML
1000 INJECTION, SOLUTION INTRAVENOUS ONCE
Status: COMPLETED | OUTPATIENT
Start: 2022-12-07 | End: 2022-12-07

## 2022-12-07 RX ORDER — SUCCINYLCHOLINE CHLORIDE 20 MG/ML
INJECTION INTRAMUSCULAR; INTRAVENOUS PRN
Status: DISCONTINUED | OUTPATIENT
Start: 2022-12-07 | End: 2022-12-07 | Stop reason: SURG

## 2022-12-07 RX ORDER — MIDAZOLAM HYDROCHLORIDE 1 MG/ML
INJECTION INTRAMUSCULAR; INTRAVENOUS PRN
Status: DISCONTINUED | OUTPATIENT
Start: 2022-12-07 | End: 2022-12-07 | Stop reason: SURG

## 2022-12-07 RX ORDER — CEFAZOLIN SODIUM 1 G/3ML
INJECTION, POWDER, FOR SOLUTION INTRAMUSCULAR; INTRAVENOUS
Status: DISCONTINUED | OUTPATIENT
Start: 2022-12-07 | End: 2022-12-07 | Stop reason: HOSPADM

## 2022-12-07 RX ORDER — SODIUM CHLORIDE, SODIUM LACTATE, POTASSIUM CHLORIDE, CALCIUM CHLORIDE 600; 310; 30; 20 MG/100ML; MG/100ML; MG/100ML; MG/100ML
INJECTION, SOLUTION INTRAVENOUS
Status: DISCONTINUED | OUTPATIENT
Start: 2022-12-07 | End: 2022-12-07 | Stop reason: SURG

## 2022-12-07 RX ORDER — CALCIUM GLUCONATE 94 MG/ML
INJECTION, SOLUTION INTRAVENOUS PRN
Status: DISCONTINUED | OUTPATIENT
Start: 2022-12-07 | End: 2022-12-07 | Stop reason: SURG

## 2022-12-07 RX ORDER — DIPHENHYDRAMINE HCL 25 MG
25 TABLET ORAL EVERY 6 HOURS PRN
Status: DISCONTINUED | OUTPATIENT
Start: 2022-12-07 | End: 2022-12-15 | Stop reason: HOSPADM

## 2022-12-07 RX ORDER — DEXAMETHASONE SODIUM PHOSPHATE 4 MG/ML
4 INJECTION, SOLUTION INTRA-ARTICULAR; INTRALESIONAL; INTRAMUSCULAR; INTRAVENOUS; SOFT TISSUE
Status: COMPLETED | OUTPATIENT
Start: 2022-12-07 | End: 2022-12-10

## 2022-12-07 RX ORDER — AMOXICILLIN 250 MG
1 CAPSULE ORAL
Status: DISCONTINUED | OUTPATIENT
Start: 2022-12-07 | End: 2022-12-15 | Stop reason: HOSPADM

## 2022-12-07 RX ORDER — VASOPRESSIN 20 U/ML
INJECTION PARENTERAL PRN
Status: DISCONTINUED | OUTPATIENT
Start: 2022-12-07 | End: 2022-12-07 | Stop reason: SURG

## 2022-12-07 RX ORDER — SODIUM CHLORIDE, SODIUM LACTATE, POTASSIUM CHLORIDE, AND CALCIUM CHLORIDE .6; .31; .03; .02 G/100ML; G/100ML; G/100ML; G/100ML
IRRIGANT IRRIGATION
Status: DISCONTINUED | OUTPATIENT
Start: 2022-12-07 | End: 2022-12-07 | Stop reason: HOSPADM

## 2022-12-07 RX ORDER — BACITRACIN ZINC 500 [USP'U]/G
OINTMENT TOPICAL
Status: DISCONTINUED | OUTPATIENT
Start: 2022-12-07 | End: 2022-12-07 | Stop reason: HOSPADM

## 2022-12-07 RX ORDER — SODIUM CHLORIDE, SODIUM GLUCONATE, SODIUM ACETATE, POTASSIUM CHLORIDE AND MAGNESIUM CHLORIDE 526; 502; 368; 37; 30 MG/100ML; MG/100ML; MG/100ML; MG/100ML; MG/100ML
INJECTION, SOLUTION INTRAVENOUS
Status: DISCONTINUED | OUTPATIENT
Start: 2022-12-07 | End: 2022-12-07 | Stop reason: SURG

## 2022-12-07 RX ADMIN — HYDROMORPHONE HYDROCHLORIDE 1 MG: 2 INJECTION INTRAMUSCULAR; INTRAVENOUS; SUBCUTANEOUS at 17:00

## 2022-12-07 RX ADMIN — Medication 50 MCG/HR: at 21:33

## 2022-12-07 RX ADMIN — PHENYLEPHRINE HYDROCHLORIDE 100 MCG: 10 INJECTION INTRAVENOUS at 14:55

## 2022-12-07 RX ADMIN — MIDAZOLAM HYDROCHLORIDE 5 MG: 1 INJECTION, SOLUTION INTRAMUSCULAR; INTRAVENOUS at 23:46

## 2022-12-07 RX ADMIN — FENTANYL CITRATE 200 MCG: 50 INJECTION INTRAMUSCULAR; INTRAVENOUS at 22:46

## 2022-12-07 RX ADMIN — LOSARTAN POTASSIUM 50 MG: 50 TABLET, FILM COATED ORAL at 05:02

## 2022-12-07 RX ADMIN — VASOPRESSIN 0.5 UNITS: 20 INJECTION INTRAVENOUS at 18:18

## 2022-12-07 RX ADMIN — PHENYLEPHRINE HYDROCHLORIDE 100 MCG: 10 INJECTION INTRAVENOUS at 13:44

## 2022-12-07 RX ADMIN — PHENYLEPHRINE HYDROCHLORIDE 100 MCG: 10 INJECTION INTRAVENOUS at 13:50

## 2022-12-07 RX ADMIN — SUCCINYLCHOLINE CHLORIDE 200 MG: 20 INJECTION, SOLUTION INTRAMUSCULAR; INTRAVENOUS; PARENTERAL at 12:14

## 2022-12-07 RX ADMIN — EPHEDRINE SULFATE 10 MG: 50 INJECTION, SOLUTION INTRAVENOUS at 13:33

## 2022-12-07 RX ADMIN — FENTANYL CITRATE 50 MCG: 50 INJECTION, SOLUTION INTRAMUSCULAR; INTRAVENOUS at 12:44

## 2022-12-07 RX ADMIN — EPHEDRINE SULFATE 10 MG: 50 INJECTION, SOLUTION INTRAVENOUS at 14:11

## 2022-12-07 RX ADMIN — FENTANYL CITRATE 100 MCG: 50 INJECTION INTRAMUSCULAR; INTRAVENOUS at 19:34

## 2022-12-07 RX ADMIN — PHENYLEPHRINE HYDROCHLORIDE 100 MCG: 10 INJECTION INTRAVENOUS at 15:28

## 2022-12-07 RX ADMIN — DEXAMETHASONE SODIUM PHOSPHATE 4 MG: 4 INJECTION, SOLUTION INTRA-ARTICULAR; INTRALESIONAL; INTRAMUSCULAR; INTRAVENOUS; SOFT TISSUE at 05:02

## 2022-12-07 RX ADMIN — EPHEDRINE SULFATE 10 MG: 50 INJECTION, SOLUTION INTRAVENOUS at 13:56

## 2022-12-07 RX ADMIN — MIDAZOLAM HYDROCHLORIDE 2 MG: 1 INJECTION, SOLUTION INTRAMUSCULAR; INTRAVENOUS at 12:10

## 2022-12-07 RX ADMIN — SODIUM CHLORIDE 1000 ML: 9 INJECTION, SOLUTION INTRAVENOUS at 22:25

## 2022-12-07 RX ADMIN — VASOPRESSIN 0.5 UNITS: 20 INJECTION INTRAVENOUS at 17:04

## 2022-12-07 RX ADMIN — CEFAZOLIN 1 G: 330 INJECTION, POWDER, FOR SOLUTION INTRAMUSCULAR; INTRAVENOUS at 16:01

## 2022-12-07 RX ADMIN — EPHEDRINE SULFATE 10 MG: 50 INJECTION, SOLUTION INTRAVENOUS at 14:04

## 2022-12-07 RX ADMIN — FENTANYL CITRATE 150 MCG: 50 INJECTION, SOLUTION INTRAMUSCULAR; INTRAVENOUS at 17:22

## 2022-12-07 RX ADMIN — PROPOFOL 200 MG: 10 INJECTION, EMULSION INTRAVENOUS at 12:14

## 2022-12-07 RX ADMIN — FENTANYL CITRATE 100 MCG: 50 INJECTION INTRAMUSCULAR; INTRAVENOUS at 20:10

## 2022-12-07 RX ADMIN — PHENYLEPHRINE HYDROCHLORIDE 100 MCG: 10 INJECTION INTRAVENOUS at 14:50

## 2022-12-07 RX ADMIN — DEXAMETHASONE SODIUM PHOSPHATE 4 MG: 4 INJECTION, SOLUTION INTRA-ARTICULAR; INTRALESIONAL; INTRAMUSCULAR; INTRAVENOUS; SOFT TISSUE at 23:18

## 2022-12-07 RX ADMIN — LIDOCAINE HYDROCHLORIDE 90 MG: 20 INJECTION, SOLUTION EPIDURAL; INFILTRATION; INTRACAUDAL at 12:14

## 2022-12-07 RX ADMIN — PHENYLEPHRINE HYDROCHLORIDE 100 MCG: 10 INJECTION INTRAVENOUS at 15:57

## 2022-12-07 RX ADMIN — PHENYLEPHRINE HYDROCHLORIDE 100 MCG: 10 INJECTION INTRAVENOUS at 13:56

## 2022-12-07 RX ADMIN — FENTANYL CITRATE 100 MCG: 50 INJECTION INTRAMUSCULAR; INTRAVENOUS at 20:46

## 2022-12-07 RX ADMIN — DOCUSATE SODIUM 50 MG AND SENNOSIDES 8.6 MG 2 TABLET: 8.6; 5 TABLET, FILM COATED ORAL at 05:01

## 2022-12-07 RX ADMIN — PHENYLEPHRINE HYDROCHLORIDE 100 MCG: 10 INJECTION INTRAVENOUS at 16:02

## 2022-12-07 RX ADMIN — PHENYLEPHRINE HYDROCHLORIDE 100 MCG: 10 INJECTION INTRAVENOUS at 14:01

## 2022-12-07 RX ADMIN — FENTANYL CITRATE 200 MCG: 50 INJECTION INTRAMUSCULAR; INTRAVENOUS at 23:26

## 2022-12-07 RX ADMIN — POTASSIUM CHLORIDE, DEXTROSE MONOHYDRATE AND SODIUM CHLORIDE: 150; 5; 900 INJECTION, SOLUTION INTRAVENOUS at 19:44

## 2022-12-07 RX ADMIN — PHENYLEPHRINE HYDROCHLORIDE 100 MCG: 10 INJECTION INTRAVENOUS at 15:40

## 2022-12-07 RX ADMIN — NOREPINEPHRINE BITARTRATE 0.45 MCG/KG/MIN: 1 INJECTION, SOLUTION, CONCENTRATE INTRAVENOUS at 21:50

## 2022-12-07 RX ADMIN — FENTANYL CITRATE 200 MCG: 50 INJECTION INTRAMUSCULAR; INTRAVENOUS at 21:52

## 2022-12-07 RX ADMIN — EPHEDRINE SULFATE 5 MG: 50 INJECTION, SOLUTION INTRAVENOUS at 12:40

## 2022-12-07 RX ADMIN — PHENYLEPHRINE HYDROCHLORIDE 100 MCG: 10 INJECTION INTRAVENOUS at 16:30

## 2022-12-07 RX ADMIN — SODIUM CHLORIDE, SODIUM GLUCONATE, SODIUM ACETATE, POTASSIUM CHLORIDE AND MAGNESIUM CHLORIDE: 526; 502; 368; 37; 30 INJECTION, SOLUTION INTRAVENOUS at 12:26

## 2022-12-07 RX ADMIN — PHENYLEPHRINE HYDROCHLORIDE 100 MCG: 10 INJECTION INTRAVENOUS at 14:11

## 2022-12-07 RX ADMIN — PHENYLEPHRINE HYDROCHLORIDE 100 MCG: 10 INJECTION INTRAVENOUS at 15:50

## 2022-12-07 RX ADMIN — FENTANYL CITRATE 100 MCG: 50 INJECTION, SOLUTION INTRAMUSCULAR; INTRAVENOUS at 13:09

## 2022-12-07 RX ADMIN — DEXAMETHASONE SODIUM PHOSPHATE 10 MG: 4 INJECTION, SOLUTION INTRA-ARTICULAR; INTRALESIONAL; INTRAMUSCULAR; INTRAVENOUS; SOFT TISSUE at 12:14

## 2022-12-07 RX ADMIN — PROPOFOL 45 MCG/KG/MIN: 10 INJECTION, EMULSION INTRAVENOUS at 21:34

## 2022-12-07 RX ADMIN — CALCIUM GLUCONATE 1 G: 98 INJECTION, SOLUTION INTRAVENOUS at 16:13

## 2022-12-07 RX ADMIN — PHENYLEPHRINE HYDROCHLORIDE 100 MCG: 10 INJECTION INTRAVENOUS at 14:36

## 2022-12-07 RX ADMIN — FAMOTIDINE 20 MG: 10 INJECTION, SOLUTION INTRAVENOUS at 23:18

## 2022-12-07 RX ADMIN — FENTANYL CITRATE 100 MCG: 50 INJECTION, SOLUTION INTRAMUSCULAR; INTRAVENOUS at 12:14

## 2022-12-07 RX ADMIN — FENTANYL CITRATE 100 MCG: 50 INJECTION, SOLUTION INTRAMUSCULAR; INTRAVENOUS at 17:41

## 2022-12-07 RX ADMIN — NOREPINEPHRINE BITARTRATE 0.05 MCG/KG/MIN: 1 INJECTION, SOLUTION, CONCENTRATE INTRAVENOUS at 20:02

## 2022-12-07 RX ADMIN — ROCURONIUM BROMIDE 40 MG: 10 INJECTION, SOLUTION INTRAVENOUS at 12:14

## 2022-12-07 RX ADMIN — REMIFENTANIL HYDROCHLORIDE 0.17 MCG/KG/MIN: 1 INJECTION, POWDER, LYOPHILIZED, FOR SOLUTION INTRAVENOUS at 12:14

## 2022-12-07 RX ADMIN — LEVETIRACETAM 500 MG: 100 INJECTION, SOLUTION INTRAVENOUS at 12:40

## 2022-12-07 RX ADMIN — LEVETIRACETAM 500 MG: 500 TABLET, FILM COATED ORAL at 05:02

## 2022-12-07 RX ADMIN — SODIUM CHLORIDE, POTASSIUM CHLORIDE, SODIUM LACTATE AND CALCIUM CHLORIDE: 600; 310; 30; 20 INJECTION, SOLUTION INTRAVENOUS at 12:08

## 2022-12-07 RX ADMIN — CEFAZOLIN 2 G: 330 INJECTION, POWDER, FOR SOLUTION INTRAMUSCULAR; INTRAVENOUS at 12:14

## 2022-12-07 RX ADMIN — SODIUM CHLORIDE: 9 INJECTION, SOLUTION INTRAVENOUS at 14:05

## 2022-12-07 RX ADMIN — PROPOFOL 35 MCG/KG/MIN: 10 INJECTION, EMULSION INTRAVENOUS at 19:30

## 2022-12-07 RX ADMIN — PHENYLEPHRINE HYDROCHLORIDE 100 MCG: 10 INJECTION INTRAVENOUS at 15:07

## 2022-12-07 RX ADMIN — PHENYLEPHRINE HYDROCHLORIDE 100 MCG: 10 INJECTION INTRAVENOUS at 16:21

## 2022-12-07 RX ADMIN — HYDROMORPHONE HYDROCHLORIDE 1 MG: 2 INJECTION INTRAMUSCULAR; INTRAVENOUS; SUBCUTANEOUS at 17:04

## 2022-12-07 RX ADMIN — PHENYLEPHRINE HYDROCHLORIDE 100 MCG: 10 INJECTION INTRAVENOUS at 14:04

## 2022-12-07 RX ADMIN — DEXMEDETOMIDINE 0.2 MCG/KG/HR: 200 INJECTION, SOLUTION INTRAVENOUS at 22:40

## 2022-12-07 RX ADMIN — CEFAZOLIN 2 G: 2 INJECTION, POWDER, FOR SOLUTION INTRAMUSCULAR; INTRAVENOUS at 21:57

## 2022-12-07 ASSESSMENT — PAIN DESCRIPTION - PAIN TYPE
TYPE: ACUTE PAIN

## 2022-12-07 ASSESSMENT — PAIN SCALES - GENERAL: PAIN_LEVEL: 0

## 2022-12-07 ASSESSMENT — FIBROSIS 4 INDEX: FIB4 SCORE: 1.36

## 2022-12-07 NOTE — OR NURSING
Pt arrived to room via bed with transport. VSS, and preop checklist complete with interpretor on the ipad. Two IVs present and both are patent. Surgeon and anesthesia both already saw the patient at the bedside. Care will continue in preop until pt is taken to the OR.

## 2022-12-07 NOTE — PROGRESS NOTES
Pt left unit in bed and off monitor with transporter. Per JOHNY Lockwood, pt okay to be off monitor and without RN for transport to pre-op. No concerns.

## 2022-12-07 NOTE — CARE PLAN
The patient is Stable - Low risk of patient condition declining or worsening    Shift Goals  Clinical Goals: Maintain current stable neuro exam.  Patient Goals: Rest  Family Goals: NURA    Progress made toward(s) clinical / shift goals:  Q4H neuro checks remain unchanged prior to surgery.    Patient is not progressing towards the following goals:N/A      Problem: Neuro Status  Goal: Neuro status will remain stable or improve  Outcome: Progressing     Problem: Urinary Elimination  Goal: Establish and maintain regular urinary output  Outcome: Progressing     Problem: Mobility  Goal: Patient's capacity to carry out activities will improve  Outcome: Progressing

## 2022-12-07 NOTE — ANESTHESIA PROCEDURE NOTES
Arterial Line  Performed by: Andres Zambrano D.O.  Authorized by: Andres Zambrano D.O.     Start Time:  12/7/2022 12:18 PM  End Time:  12/7/2022 12:22 PM  Localization: ultrasound guidance and surface landmarks  Image captured, interpreted and electronically stored.  Patient Location:  OR  Indication: continuous blood pressure monitoring        Catheter Size:  20 G  Seldinger Technique?: Yes    Laterality:  Left  Site:  Radial artery  Line Secured:  Antimicrobial disc, tape and transparent dressing  Events: patient tolerated procedure well with no complications

## 2022-12-07 NOTE — PROGRESS NOTES
Neurosurgery Progress Note    Subjective:  No acute events over night   Reports feeling/doing well      Exam:  A/Ox4  CRISTY HARRISE no focal deficitis          BP  Min: 99/56  Max: 139/73  Pulse  Av.7  Min: 54  Max: 97  Resp  Av.1  Min: 10  Max: 32  Temp  Av.5 °C (97.7 °F)  Min: 36.1 °C (97 °F)  Max: 36.8 °C (98.2 °F)  SpO2  Av.3 %  Min: 93 %  Max: 98 %    No data recorded    Recent Labs     22  0450 22  0519   WBC 11.1* 9.6   RBC 5.03 4.80   HEMOGLOBIN 15.0 14.7   HEMATOCRIT 43.9 42.5   MCV 87.3 88.5   MCH 29.8 30.6   MCHC 34.2 34.6   RDW 43.6 44.6   PLATELETCT 184 145*   MPV 9.4 9.7       Recent Labs     22  0625 22  0450 22  0519   SODIUM 133* 132* 133*   POTASSIUM 4.2 4.4 4.4   CHLORIDE 98 98 101   CO2 25 23 21   GLUCOSE 166* 166* 160*   BUN 22 24* 22   CREATININE 0.65 0.63 0.57   CALCIUM 9.0 8.8 8.8       Recent Labs     22  1530   APTT 26.2   INR 1.02             Intake/Output                         22 07 - 22 0659 22 07 - 22 0659      Total  Total                 Intake    Total Intake -- -- -- -- -- --       Output    Urine  800  1350 2150  350  -- 350    Number of Times Voided 2 x 2 x 4 x 0 x -- 0 x    Urine Void (mL) 800 1350 2150 350 -- 350    Stool  --  0 0  --  -- --    Number of Times Stooled 0 x 0 x 0 x 0 x -- 0 x    Measurable Stool (mL) -- 0 0 -- -- --    Total Output 800 1350 2150 350 -- 350       Net I/O     -800 -1350 -2150 -350 -- -350              Intake/Output Summary (Last 24 hours) at 2022 1109  Last data filed at 2022 0800  Gross per 24 hour   Intake --   Output 2000 ml   Net -2000 ml               [MAR Hold] labetalol  10-20 mg Q4HRS PRN    [MAR Hold] levETIRAcetam  500 mg BID    [MAR Hold] dexamethasone  4 mg Q6HRS    [MAR Hold] losartan  50 mg BID    [MAR Hold] senna-docusate  2 Tablet BID    And    [MAR Hold] polyethylene glycol/lytes  1 Packet QDAY PRN     And    [MAR Hold] magnesium hydroxide  30 mL QDAY PRN    And    [MAR Hold] bisacodyl  10 mg QDAY PRN    [MAR Hold] Respiratory Therapy Consult   Continuous RT    [MAR Hold] acetaminophen  650 mg Q6HRS PRN    [MAR Hold] ondansetron  4 mg Q4HRS PRN    [MAR Hold] ondansetron  4 mg Q4HRS PRN    [MAR Hold] promethazine  12.5-25 mg Q4HRS PRN    [MAR Hold] promethazine  12.5-25 mg Q4HRS PRN    [MAR Hold] prochlorperazine  5-10 mg Q4HRS PRN    [MAR Hold] hydrALAZINE  10 mg Q4HRS PRN       Assessment and Plan:  Hospital day  7 h  POD #2 embo   Prophylactic anticoagulation: ambulating    12/5 IR embolization of bilateral MMA and right occipital artery  OR today for Bilateral parietal occipital craniectomy for resection of brain lesion and mesh cranioplasty using neuro monitoring     Post op   Admit to ICU   Ct head in am   Keppra 500mg BID  Dex  -145   Q1 hr neuro checks   HVC in am       Gagandeep

## 2022-12-07 NOTE — ANESTHESIA PREPROCEDURE EVALUATION
Case: [id number] Date/Time: 12/07/22 1145    Procedure: STEALTH PARIETAL OCCIPITAL CRANIOTOMY FOR TUMOR RESECTION, MIDLINE    Location: [facility] OR 05 / SURGERY [facility]    Surgeons: Dieudonne Donis M.D.          Relevant Problems   CARDIAC   (positive) Hypertension      Other   (positive) Brain mass       Physical Exam    Airway   Mallampati: II  TM distance: >3 FB  Neck ROM: full       Cardiovascular - normal exam  Rhythm: regular  Rate: normal  (-) murmur     Dental - normal exam           Pulmonary - normal exam  Breath sounds clear to auscultation     Abdominal    Neurological - normal exam                 Anesthesia Plan    ASA 2       Plan - general       Airway plan will be ETT          Induction: intravenous    Postoperative Plan: Postoperative administration of opioids is intended.    Pertinent diagnostic labs and testing reviewed    Informed Consent:    Anesthetic plan and risks discussed with patient.    Use of blood products discussed with: patient whom consented to blood products.

## 2022-12-07 NOTE — CARE PLAN
The patient is Stable - Low risk of patient condition declining or worsening    Shift Goals  Clinical Goals: CHG, neuro assessments as ordered  Patient Goals: Rest, brush teeth  Family Goals: NURA    Progress made toward(s) clinical / shift goals:  Neuro assessments as ordered, CHG bath explained      Problem: Knowledge Deficit - Standard  Goal: Patient and family/care givers will demonstrate understanding of plan of care, disease process/condition, diagnostic tests and medications  Outcome: Progressing  Patient verbally demonstrates understanding of disease process and goals of care.     Problem: Pain - Standard  Goal: Alleviation of pain or a reduction in pain to the patient’s comfort goal  Outcome: Progressing  Patient declines pain at this time. Patient verbally demonstrates understanding of pain rating scale and to communicate with staff if/when pain occurs.      Problem: Fall Risk  Goal: Patient will remain free from falls  Outcome: Progressing  Patient educated on fall risks, use of the call light, and safety precautions. Appropriate assessment and signs in place.      Problem: Neuro Status  Goal: Neuro status will remain stable or improve  Outcome: Progressing  Patient's neuro status remains stable at this time.      Problem: Mobility  Goal: Patient's capacity to carry out activities will improve  Outcome: Progressing  Patient's mobility increasing as tolerated at this time.        Patient is not progressing towards the following goals: N/A

## 2022-12-07 NOTE — ANESTHESIA PROCEDURE NOTES
Airway    Date/Time: 12/7/2022 12:15 PM  Performed by: Andres Zambrano D.O.  Authorized by: Andres Zambrano D.O.     Location:  OR  Urgency:  Elective  Indications for Airway Management:  Anesthesia      Spontaneous Ventilation: absent    Sedation Level:  Deep  Preoxygenated: Yes    Patient Position:  Sniffing  Mask Difficulty Assessment:  0 - not attempted  Final Airway Type:  Endotracheal airway  Final Endotracheal Airway:  ETT  Cuffed: Yes    Technique Used for Successful ETT Placement:  Direct laryngoscopy    Insertion Site:  Oral  Blade Type:  Dale  Laryngoscope Blade/Videolaryngoscope Blade Size:  4  ETT Size (mm):  8.0  Measured from:  Teeth  ETT to Teeth (cm):  24  Placement Verified by: auscultation and capnometry    Cormack-Lehane Classification:  Grade IIa - partial view of glottis  Number of Attempts at Approach:  1

## 2022-12-07 NOTE — PROGRESS NOTES
"Critical Care Progress Note    Date of admission  11/30/2022    Chief Complaint  57 y.o. male admitted 11/30/2022 with intracranial mass    Hospital Course  Ambrosio Hardy is a 57-year-old male who presented 11/30/2022 with headache x2 months.  He describes the pain located in the back of his head 6-7/10.  He presented to [facility] in [facility] where CT showed occipital mass with shift.  He was sent here for neurosurgery evaluation.  He was evaluated by Dr. Donis who started him on Decadron and Keppra.  CT chest/abdomen/pelvis showed no evidence of metastasis.  Dr. Donis plans on surgery Wednesday 12/7 and recommended preoperative angiogram for possible embolization.  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  These vessels were embolized with 150-300 UM particles and Gelfoam\" (Dr. Mcdonald procedure note 12/5).       Interval Problem Update  Reviewed last 24 hour events:   - No acute events overnight   - Neuro: AOx4,   - HR: 50s-90s   - SBP: 100s-110s   - GI: NPO for surgery, last BM PTA   - UOP: 2.15 L/24 hrs   - Tsai: no   - Tm: 37.1   - Lines: PIV   - PPx: GI not indicated, DVT not indicated   - RA   - CXR (personally reviewed and compared to prior): No acute   - To OR this AM, not examined due to being in OR    Review of Systems  Review of Systems   Reason unable to perform ROS: deferred to post-op.      Vital Signs for last 24 hours   Temp:  [36.1 °C (97 °F)-37.1 °C (98.7 °F)] 36.1 °C (97 °F)  Pulse:  [54-97] 55  Resp:  [10-32] 12  BP: ()/(56-77) 111/62  SpO2:  [94 %-98 %] 94 %    Hemodynamic parameters for last 24 hours       Respiratory Information for the last 24 hours       Physical Exam   Physical Exam  Vitals reviewed: In OR.       Medications  Current Facility-Administered Medications   Medication Dose Route Frequency Provider Last Rate Last Admin    labetalol (NORMODYNE/TRANDATE) injection 10-20 mg  10-20 mg Intravenous Q4HRS " PRN SAWYER Peña        levETIRAcetam (KEPPRA) tablet 500 mg  500 mg Oral BID Yossi Williamson M.D.   500 mg at 12/07/22 0502    dexamethasone (DECADRON) injection 4 mg  4 mg Intravenous Q6HRS Yossi Williamson M.D.   4 mg at 12/07/22 0502    losartan (COZAAR) tablet 50 mg  50 mg Oral BID Yossi Williamson M.D.   50 mg at 12/07/22 0502    senna-docusate (PERICOLACE or SENOKOT S) 8.6-50 MG per tablet 2 Tablet  2 Tablet Oral BID Yossi Williamson M.D.   2 Tablet at 12/07/22 0501    And    polyethylene glycol/lytes (MIRALAX) PACKET 1 Packet  1 Packet Oral QDAY PRN Yossi Williamson M.D.        And    magnesium hydroxide (MILK OF MAGNESIA) suspension 30 mL  30 mL Oral QDAY PRN Yossi Williamson M.D.        And    bisacodyl (DULCOLAX) suppository 10 mg  10 mg Rectal QDAY PRN Yossi Williamson M.D.        Respiratory Therapy Consult   Nebulization Continuous RT Yossi Williamson M.D.        acetaminophen (Tylenol) tablet 650 mg  650 mg Oral Q6HRS PRN Yossi Williamson M.D.   650 mg at 12/06/22 0230    ondansetron (ZOFRAN) syringe/vial injection 4 mg  4 mg Intravenous Q4HRS PRN Yossi Williamson M.D.        ondansetron (ZOFRAN ODT) dispertab 4 mg  4 mg Oral Q4HRS PRN Yossi Williamson M.D.        promethazine (PHENERGAN) tablet 12.5-25 mg  12.5-25 mg Oral Q4HRS PRN Yossi Williamson M.D.        promethazine (PHENERGAN) suppository 12.5-25 mg  12.5-25 mg Rectal Q4HRS PRN Yossi Williamson M.D.        prochlorperazine (COMPAZINE) injection 5-10 mg  5-10 mg Intravenous Q4HRS PRN Yossi Williamson M.D.        hydrALAZINE (APRESOLINE) injection 10 mg  10 mg Intravenous Q4HRS PRN KEN PeñaPByronRByronN.           Fluids    Intake/Output Summary (Last 24 hours) at 12/7/2022 0758  Last data filed at 12/7/2022 0600  Gross per 24 hour   Intake --   Output 2150 ml   Net -2150 ml       Laboratory          Recent Labs     12/05/22  0625 12/06/22  0450 12/07/22  0519    SODIUM 133* 132* 133*   POTASSIUM 4.2 4.4 4.4   CHLORIDE 98 98 101   CO2 25 23 21   BUN 22 24* 22   CREATININE 0.65 0.63 0.57   MAGNESIUM  --  2.2 2.1   PHOSPHORUS  --  4.2 3.6   CALCIUM 9.0 8.8 8.8     Recent Labs     12/05/22  0625 12/06/22  0450 12/07/22  0519   ALTSGPT 14 12 12   ASTSGOT 9* 8* 12   ALKPHOSPHAT 89 84 81   TBILIRUBIN 1.0 1.0 0.9   GLUCOSE 166* 166* 160*     Recent Labs     12/05/22  0625 12/06/22  0450 12/07/22  0519   WBC  --  11.1* 9.6   ASTSGOT 9* 8* 12   ALTSGPT 14 12 12   ALKPHOSPHAT 89 84 81   TBILIRUBIN 1.0 1.0 0.9     Recent Labs     12/06/22  0450 12/06/22  1530 12/07/22  0519   RBC 5.03  --  4.80   HEMOGLOBIN 15.0  --  14.7   HEMATOCRIT 43.9  --  42.5   PLATELETCT 184  --  145*   PROTHROMBTM  --  13.3  --    APTT  --  26.2  --    INR  --  1.02  --        Imaging  No new    Assessment/Plan  * Brain mass- (present on admission)  Assessment & Plan  Patient presented to outside facility with headache x2 months  No family history  MRI brain here showed 6.7 x 4.7 x 5.9 cm size enhancing extra-axial mass noted bilateral mid posterior parietal region with 6 mm midline shift  Neurosurgery consulted and started on Keppra and steroids.  Plan for surgical intervention 12/7  IR embolization 12/6  To OR today    Hyponatremia- (present on admission)  Assessment & Plan  Stable  Mild  Follow BMP    Hyperglycemia- (present on admission)  Assessment & Plan  On steroids, no known history of DM2  A1c 6.4  Monitor  Goal 140-180    Hypertension- (present on admission)  Assessment & Plan  On losartan as outpatient  As needed IV antihypertensives with parameters <160 mmHg       VTE:  Contraindicated  Ulcer: Not Indicated  Lines: None    I have performed a physical exam and reviewed and updated ROS and Plan today (12/7/2022). In review of yesterday's note (12/6/2022), there are no changes except as documented above.     Please note that this dictation was created using voice recognition software. The accuracy  of the dictation is limited to the abilities of the software. I have made every reasonable attempt to correct obvious errors, but I expect that there are errors of grammar and possibly content that I did not discover before finalizing the note.     Discussed patient condition and risk of morbidity and/or mortality with RN, RT, Pharmacy, Patient, and neurosurgery

## 2022-12-08 ENCOUNTER — APPOINTMENT (OUTPATIENT)
Dept: RADIOLOGY | Facility: MEDICAL CENTER | Age: 57
DRG: 025 | End: 2022-12-08
Attending: NURSE PRACTITIONER
Payer: COMMERCIAL

## 2022-12-08 ENCOUNTER — HOSPITAL ENCOUNTER (OUTPATIENT)
Dept: RADIOLOGY | Facility: MEDICAL CENTER | Age: 57
End: 2022-12-08
Attending: PHYSICIAN ASSISTANT
Payer: COMMERCIAL

## 2022-12-08 PROBLEM — E87.20 LACTIC ACID ACIDOSIS: Status: ACTIVE | Noted: 2022-12-08

## 2022-12-08 PROBLEM — R65.10 SIRS (SYSTEMIC INFLAMMATORY RESPONSE SYNDROME) (HCC): Status: ACTIVE | Noted: 2022-12-08

## 2022-12-08 LAB
ALBUMIN SERPL BCP-MCNC: 2.8 G/DL (ref 3.2–4.9)
ALBUMIN/GLOB SERPL: 1.6 G/DL
ALP SERPL-CCNC: 67 U/L (ref 30–99)
ALT SERPL-CCNC: 16 U/L (ref 2–50)
ANION GAP SERPL CALC-SCNC: 14 MMOL/L (ref 7–16)
ANION GAP SERPL CALC-SCNC: 8 MMOL/L (ref 7–16)
APPEARANCE UR: CLEAR
AST SERPL-CCNC: 16 U/L (ref 12–45)
BASE EXCESS BLDA CALC-SCNC: -7 MMOL/L (ref -4–3)
BILIRUB SERPL-MCNC: 1.1 MG/DL (ref 0.1–1.5)
BILIRUB UR QL STRIP.AUTO: NEGATIVE
BODY TEMPERATURE: ABNORMAL DEGREES
BREATHS SETTING VENT: 16
BUN SERPL-MCNC: 23 MG/DL (ref 8–22)
BUN SERPL-MCNC: 24 MG/DL (ref 8–22)
CA-I SERPL-SCNC: 1.1 MMOL/L (ref 1.1–1.3)
CALCIUM SERPL-MCNC: 8 MG/DL (ref 8.5–10.5)
CALCIUM SERPL-MCNC: 8.3 MG/DL (ref 8.5–10.5)
CHLORIDE SERPL-SCNC: 104 MMOL/L (ref 96–112)
CHLORIDE SERPL-SCNC: 107 MMOL/L (ref 96–112)
CK SERPL-CCNC: 146 U/L (ref 0–154)
CO2 BLDA-SCNC: 19 MMOL/L (ref 20–33)
CO2 SERPL-SCNC: 17 MMOL/L (ref 20–33)
CO2 SERPL-SCNC: 19 MMOL/L (ref 20–33)
COLOR UR: ABNORMAL
CREAT SERPL-MCNC: 0.81 MG/DL (ref 0.5–1.4)
CREAT SERPL-MCNC: 0.87 MG/DL (ref 0.5–1.4)
DELSYS IDSYS: ABNORMAL
EKG IMPRESSION: NORMAL
EPI CELLS #/AREA URNS HPF: ABNORMAL /HPF
ERYTHROCYTE [DISTWIDTH] IN BLOOD BY AUTOMATED COUNT: 46.5 FL (ref 35.9–50)
ERYTHROCYTE [DISTWIDTH] IN BLOOD BY AUTOMATED COUNT: 46.9 FL (ref 35.9–50)
GFR SERPLBLD CREATININE-BSD FMLA CKD-EPI: 101 ML/MIN/1.73 M 2
GFR SERPLBLD CREATININE-BSD FMLA CKD-EPI: 103 ML/MIN/1.73 M 2
GLOBULIN SER CALC-MCNC: 1.8 G/DL (ref 1.9–3.5)
GLUCOSE SERPL-MCNC: 259 MG/DL (ref 65–99)
GLUCOSE SERPL-MCNC: 263 MG/DL (ref 65–99)
GLUCOSE UR STRIP.AUTO-MCNC: NEGATIVE MG/DL
HCO3 BLDA-SCNC: 17.6 MMOL/L (ref 17–25)
HCT VFR BLD AUTO: 38.3 % (ref 42–52)
HCT VFR BLD AUTO: 39.7 % (ref 42–52)
HGB BLD-MCNC: 13.4 G/DL (ref 14–18)
HGB BLD-MCNC: 13.8 G/DL (ref 14–18)
HOROWITZ INDEX BLDA+IHG-RTO: 215 MM[HG]
HYALINE CASTS #/AREA URNS LPF: ABNORMAL /LPF
KETONES UR STRIP.AUTO-MCNC: NEGATIVE MG/DL
LACTATE SERPL-SCNC: 3.3 MMOL/L (ref 0.5–2)
LACTATE SERPL-SCNC: 3.8 MMOL/L (ref 0.5–2)
LACTATE SERPL-SCNC: 3.9 MMOL/L (ref 0.5–2)
LACTATE SERPL-SCNC: 4.7 MMOL/L (ref 0.5–2)
LACTATE SERPL-SCNC: 5 MMOL/L (ref 0.5–2)
LEUKOCYTE ESTERASE UR QL STRIP.AUTO: NEGATIVE
MAGNESIUM SERPL-MCNC: 1.9 MG/DL (ref 1.5–2.5)
MAGNESIUM SERPL-MCNC: 2 MG/DL (ref 1.5–2.5)
MCH RBC QN AUTO: 30.3 PG (ref 27–33)
MCH RBC QN AUTO: 30.9 PG (ref 27–33)
MCHC RBC AUTO-ENTMCNC: 34.8 G/DL (ref 33.7–35.3)
MCHC RBC AUTO-ENTMCNC: 35 G/DL (ref 33.7–35.3)
MCV RBC AUTO: 87.3 FL (ref 81.4–97.8)
MCV RBC AUTO: 88.5 FL (ref 81.4–97.8)
MICRO URNS: ABNORMAL
MODE IMODE: ABNORMAL
NITRITE UR QL STRIP.AUTO: NEGATIVE
O2/TOTAL GAS SETTING VFR VENT: 40 %
PATHOLOGY CONSULT NOTE: NORMAL
PCO2 BLDA: 31.6 MMHG (ref 26–37)
PCO2 TEMP ADJ BLDA: 34.7 MMHG (ref 26–37)
PEEP END EXPIRATORY PRESSURE IPEEP: 8 CMH20
PH BLDA: 7.35 [PH] (ref 7.4–7.5)
PH TEMP ADJ BLDA: 7.32 [PH] (ref 7.4–7.5)
PH UR STRIP.AUTO: 5 [PH] (ref 5–8)
PHOSPHATE SERPL-MCNC: 4.4 MG/DL (ref 2.5–4.5)
PHOSPHATE SERPL-MCNC: 4.9 MG/DL (ref 2.5–4.5)
PLATELET # BLD AUTO: 113 K/UL (ref 164–446)
PLATELET # BLD AUTO: 134 K/UL (ref 164–446)
PMV BLD AUTO: 10.1 FL (ref 9–12.9)
PMV BLD AUTO: 10.2 FL (ref 9–12.9)
PO2 BLDA: 86 MMHG (ref 64–87)
PO2 TEMP ADJ BLDA: 99 MMHG (ref 64–87)
POTASSIUM SERPL-SCNC: 4.9 MMOL/L (ref 3.6–5.5)
POTASSIUM SERPL-SCNC: 5.5 MMOL/L (ref 3.6–5.5)
PROT SERPL-MCNC: 4.6 G/DL (ref 6–8.2)
PROT UR QL STRIP: NEGATIVE MG/DL
RBC # BLD AUTO: 4.33 M/UL (ref 4.7–6.1)
RBC # BLD AUTO: 4.55 M/UL (ref 4.7–6.1)
RBC # URNS HPF: ABNORMAL /HPF
RBC UR QL AUTO: ABNORMAL
SAO2 % BLDA: 96 % (ref 93–99)
SCCMEC + MECA PNL NOSE NAA+PROBE: NEGATIVE
SODIUM SERPL-SCNC: 134 MMOL/L (ref 135–145)
SODIUM SERPL-SCNC: 135 MMOL/L (ref 135–145)
SP GR UR STRIP.AUTO: 1.03
SPECIMEN DRAWN FROM PATIENT: ABNORMAL
TIDAL VOLUME IVT: 440 ML
UROBILINOGEN UR STRIP.AUTO-MCNC: 1 MG/DL
WBC # BLD AUTO: 32.9 K/UL (ref 4.8–10.8)
WBC # BLD AUTO: 41.8 K/UL (ref 4.8–10.8)
WBC #/AREA URNS HPF: ABNORMAL /HPF

## 2022-12-08 PROCEDURE — 700101 HCHG RX REV CODE 250: Performed by: STUDENT IN AN ORGANIZED HEALTH CARE EDUCATION/TRAINING PROGRAM

## 2022-12-08 PROCEDURE — A9270 NON-COVERED ITEM OR SERVICE: HCPCS | Performed by: STUDENT IN AN ORGANIZED HEALTH CARE EDUCATION/TRAINING PROGRAM

## 2022-12-08 PROCEDURE — 82550 ASSAY OF CK (CPK): CPT

## 2022-12-08 PROCEDURE — 700102 HCHG RX REV CODE 250 W/ 637 OVERRIDE(OP): Performed by: PHYSICIAN ASSISTANT

## 2022-12-08 PROCEDURE — 81001 URINALYSIS AUTO W/SCOPE: CPT

## 2022-12-08 PROCEDURE — 700105 HCHG RX REV CODE 258: Performed by: INTERNAL MEDICINE

## 2022-12-08 PROCEDURE — 93010 ELECTROCARDIOGRAM REPORT: CPT | Performed by: INTERNAL MEDICINE

## 2022-12-08 PROCEDURE — 700105 HCHG RX REV CODE 258: Performed by: STUDENT IN AN ORGANIZED HEALTH CARE EDUCATION/TRAINING PROGRAM

## 2022-12-08 PROCEDURE — 80053 COMPREHEN METABOLIC PANEL: CPT

## 2022-12-08 PROCEDURE — 87040 BLOOD CULTURE FOR BACTERIA: CPT | Mod: 91

## 2022-12-08 PROCEDURE — 82803 BLOOD GASES ANY COMBINATION: CPT

## 2022-12-08 PROCEDURE — 94799 UNLISTED PULMONARY SVC/PX: CPT

## 2022-12-08 PROCEDURE — 37799 UNLISTED PX VASCULAR SURGERY: CPT

## 2022-12-08 PROCEDURE — 700101 HCHG RX REV CODE 250: Performed by: PHYSICIAN ASSISTANT

## 2022-12-08 PROCEDURE — 87641 MR-STAPH DNA AMP PROBE: CPT

## 2022-12-08 PROCEDURE — 83605 ASSAY OF LACTIC ACID: CPT | Mod: 91

## 2022-12-08 PROCEDURE — 700111 HCHG RX REV CODE 636 W/ 250 OVERRIDE (IP)

## 2022-12-08 PROCEDURE — 700111 HCHG RX REV CODE 636 W/ 250 OVERRIDE (IP): Performed by: INTERNAL MEDICINE

## 2022-12-08 PROCEDURE — 70450 CT HEAD/BRAIN W/O DYE: CPT

## 2022-12-08 PROCEDURE — 700105 HCHG RX REV CODE 258: Performed by: PHYSICIAN ASSISTANT

## 2022-12-08 PROCEDURE — 700102 HCHG RX REV CODE 250 W/ 637 OVERRIDE(OP): Performed by: STUDENT IN AN ORGANIZED HEALTH CARE EDUCATION/TRAINING PROGRAM

## 2022-12-08 PROCEDURE — 770022 HCHG ROOM/CARE - ICU (200)

## 2022-12-08 PROCEDURE — 700102 HCHG RX REV CODE 250 W/ 637 OVERRIDE(OP): Performed by: NURSE PRACTITIONER

## 2022-12-08 PROCEDURE — 700105 HCHG RX REV CODE 258: Performed by: NURSE PRACTITIONER

## 2022-12-08 PROCEDURE — 85027 COMPLETE CBC AUTOMATED: CPT | Mod: 91

## 2022-12-08 PROCEDURE — 94760 N-INVAS EAR/PLS OXIMETRY 1: CPT

## 2022-12-08 PROCEDURE — 71045 X-RAY EXAM CHEST 1 VIEW: CPT

## 2022-12-08 PROCEDURE — 84100 ASSAY OF PHOSPHORUS: CPT | Mod: 91

## 2022-12-08 PROCEDURE — 700111 HCHG RX REV CODE 636 W/ 250 OVERRIDE (IP): Performed by: STUDENT IN AN ORGANIZED HEALTH CARE EDUCATION/TRAINING PROGRAM

## 2022-12-08 PROCEDURE — 80048 BASIC METABOLIC PNL TOTAL CA: CPT

## 2022-12-08 PROCEDURE — 94003 VENT MGMT INPAT SUBQ DAY: CPT

## 2022-12-08 PROCEDURE — A9270 NON-COVERED ITEM OR SERVICE: HCPCS | Performed by: PHYSICIAN ASSISTANT

## 2022-12-08 PROCEDURE — 700111 HCHG RX REV CODE 636 W/ 250 OVERRIDE (IP): Performed by: HOSPITALIST

## 2022-12-08 PROCEDURE — A9270 NON-COVERED ITEM OR SERVICE: HCPCS | Performed by: NURSE PRACTITIONER

## 2022-12-08 PROCEDURE — 700111 HCHG RX REV CODE 636 W/ 250 OVERRIDE (IP): Performed by: NURSE PRACTITIONER

## 2022-12-08 PROCEDURE — 99292 CRITICAL CARE ADDL 30 MIN: CPT | Performed by: INTERNAL MEDICINE

## 2022-12-08 PROCEDURE — 83735 ASSAY OF MAGNESIUM: CPT | Mod: 91

## 2022-12-08 PROCEDURE — 82330 ASSAY OF CALCIUM: CPT

## 2022-12-08 PROCEDURE — 700111 HCHG RX REV CODE 636 W/ 250 OVERRIDE (IP): Performed by: PHYSICIAN ASSISTANT

## 2022-12-08 RX ORDER — HALOPERIDOL 5 MG/ML
1 INJECTION INTRAMUSCULAR
Status: DISCONTINUED | OUTPATIENT
Start: 2022-12-08 | End: 2022-12-08

## 2022-12-08 RX ORDER — PHENYLEPHRINE HYDROCHLORIDE 10 MG/ML
INJECTION, SOLUTION INTRAMUSCULAR; INTRAVENOUS; SUBCUTANEOUS
Status: COMPLETED
Start: 2022-12-08 | End: 2022-12-08

## 2022-12-08 RX ORDER — ADENOSINE 3 MG/ML
6 INJECTION, SOLUTION INTRAVENOUS ONCE
Status: COMPLETED | OUTPATIENT
Start: 2022-12-08 | End: 2022-12-08

## 2022-12-08 RX ORDER — ACETAMINOPHEN 500 MG
1000 TABLET ORAL EVERY 6 HOURS
Status: DISCONTINUED | OUTPATIENT
Start: 2022-12-08 | End: 2022-12-15 | Stop reason: HOSPADM

## 2022-12-08 RX ORDER — MEPERIDINE HYDROCHLORIDE 50 MG/ML
25 INJECTION INTRAMUSCULAR; INTRAVENOUS; SUBCUTANEOUS
Status: DISCONTINUED | OUTPATIENT
Start: 2022-12-08 | End: 2022-12-08

## 2022-12-08 RX ORDER — MIDAZOLAM HYDROCHLORIDE 1 MG/ML
1 INJECTION INTRAMUSCULAR; INTRAVENOUS
Status: DISCONTINUED | OUTPATIENT
Start: 2022-12-08 | End: 2022-12-08

## 2022-12-08 RX ORDER — METOPROLOL TARTRATE 1 MG/ML
1 INJECTION, SOLUTION INTRAVENOUS
Status: DISCONTINUED | OUTPATIENT
Start: 2022-12-08 | End: 2022-12-08

## 2022-12-08 RX ORDER — HYDROMORPHONE HYDROCHLORIDE 1 MG/ML
0.1 INJECTION, SOLUTION INTRAMUSCULAR; INTRAVENOUS; SUBCUTANEOUS
Status: DISCONTINUED | OUTPATIENT
Start: 2022-12-08 | End: 2022-12-08

## 2022-12-08 RX ORDER — DIPHENHYDRAMINE HYDROCHLORIDE 50 MG/ML
12.5 INJECTION INTRAMUSCULAR; INTRAVENOUS
Status: DISCONTINUED | OUTPATIENT
Start: 2022-12-08 | End: 2022-12-08

## 2022-12-08 RX ORDER — HYDRALAZINE HYDROCHLORIDE 20 MG/ML
5 INJECTION INTRAMUSCULAR; INTRAVENOUS
Status: DISCONTINUED | OUTPATIENT
Start: 2022-12-08 | End: 2022-12-08

## 2022-12-08 RX ORDER — PHENYLEPHRINE HCL IN 0.9% NACL 0.5 MG/5ML
SYRINGE (ML) INTRAVENOUS
Status: DISCONTINUED
Start: 2022-12-08 | End: 2022-12-08

## 2022-12-08 RX ORDER — MEPERIDINE HYDROCHLORIDE 25 MG/ML
12.5 INJECTION INTRAMUSCULAR; INTRAVENOUS; SUBCUTANEOUS
Status: DISCONTINUED | OUTPATIENT
Start: 2022-12-08 | End: 2022-12-08

## 2022-12-08 RX ORDER — HYDROMORPHONE HYDROCHLORIDE 1 MG/ML
0.2 INJECTION, SOLUTION INTRAMUSCULAR; INTRAVENOUS; SUBCUTANEOUS
Status: DISCONTINUED | OUTPATIENT
Start: 2022-12-08 | End: 2022-12-08

## 2022-12-08 RX ORDER — LEVETIRACETAM 500 MG/5ML
500 INJECTION, SOLUTION, CONCENTRATE INTRAVENOUS EVERY 12 HOURS
Status: DISCONTINUED | OUTPATIENT
Start: 2022-12-08 | End: 2022-12-09

## 2022-12-08 RX ORDER — SODIUM CHLORIDE, SODIUM LACTATE, POTASSIUM CHLORIDE, CALCIUM CHLORIDE 600; 310; 30; 20 MG/100ML; MG/100ML; MG/100ML; MG/100ML
INJECTION, SOLUTION INTRAVENOUS CONTINUOUS
Status: DISCONTINUED | OUTPATIENT
Start: 2022-12-08 | End: 2022-12-08

## 2022-12-08 RX ORDER — HYDROMORPHONE HYDROCHLORIDE 1 MG/ML
0.4 INJECTION, SOLUTION INTRAMUSCULAR; INTRAVENOUS; SUBCUTANEOUS
Status: DISCONTINUED | OUTPATIENT
Start: 2022-12-08 | End: 2022-12-08

## 2022-12-08 RX ORDER — SODIUM CHLORIDE, SODIUM LACTATE, POTASSIUM CHLORIDE, AND CALCIUM CHLORIDE .6; .31; .03; .02 G/100ML; G/100ML; G/100ML; G/100ML
1000 INJECTION, SOLUTION INTRAVENOUS ONCE
Status: COMPLETED | OUTPATIENT
Start: 2022-12-08 | End: 2022-12-08

## 2022-12-08 RX ORDER — SODIUM CHLORIDE 9 MG/ML
1000 INJECTION, SOLUTION INTRAVENOUS ONCE
Status: COMPLETED | OUTPATIENT
Start: 2022-12-08 | End: 2022-12-08

## 2022-12-08 RX ORDER — OXYCODONE HCL 5 MG/5 ML
10 SOLUTION, ORAL ORAL
Status: DISCONTINUED | OUTPATIENT
Start: 2022-12-08 | End: 2022-12-08

## 2022-12-08 RX ORDER — OXYCODONE HCL 5 MG/5 ML
5 SOLUTION, ORAL ORAL
Status: DISCONTINUED | OUTPATIENT
Start: 2022-12-08 | End: 2022-12-08

## 2022-12-08 RX ORDER — ONDANSETRON 2 MG/ML
4 INJECTION INTRAMUSCULAR; INTRAVENOUS
Status: DISCONTINUED | OUTPATIENT
Start: 2022-12-08 | End: 2022-12-09

## 2022-12-08 RX ADMIN — ACETAMINOPHEN 1000 MG: 500 TABLET ORAL at 12:04

## 2022-12-08 RX ADMIN — CEFTRIAXONE SODIUM 2000 MG: 10 INJECTION, POWDER, FOR SOLUTION INTRAVENOUS at 18:16

## 2022-12-08 RX ADMIN — PHENYLEPHRINE HYDROCHLORIDE 40000 MCG: 10 INJECTION INTRAVENOUS at 00:10

## 2022-12-08 RX ADMIN — ADENOSINE 6 MG: 3 INJECTION INTRAVENOUS at 00:03

## 2022-12-08 RX ADMIN — PHENYLEPHRINE HYDROCHLORIDE 3.5 MCG/KG/MIN: 10 INJECTION INTRAVENOUS at 07:53

## 2022-12-08 RX ADMIN — SODIUM CHLORIDE 1000 ML: 9 INJECTION, SOLUTION INTRAVENOUS at 11:09

## 2022-12-08 RX ADMIN — VANCOMYCIN HYDROCHLORIDE 2250 MG: 500 INJECTION, POWDER, LYOPHILIZED, FOR SOLUTION INTRAVENOUS at 06:39

## 2022-12-08 RX ADMIN — CEFTRIAXONE SODIUM 2000 MG: 10 INJECTION, POWDER, FOR SOLUTION INTRAVENOUS at 08:23

## 2022-12-08 RX ADMIN — POTASSIUM CHLORIDE, DEXTROSE MONOHYDRATE AND SODIUM CHLORIDE: 150; 5; 900 INJECTION, SOLUTION INTRAVENOUS at 06:44

## 2022-12-08 RX ADMIN — FAMOTIDINE 20 MG: 20 TABLET, FILM COATED ORAL at 18:16

## 2022-12-08 RX ADMIN — DEXAMETHASONE SODIUM PHOSPHATE 4 MG: 4 INJECTION, SOLUTION INTRA-ARTICULAR; INTRALESIONAL; INTRAMUSCULAR; INTRAVENOUS; SOFT TISSUE at 12:05

## 2022-12-08 RX ADMIN — PHENYLEPHRINE HYDROCHLORIDE 1.25 MCG/KG/MIN: 10 INJECTION INTRAVENOUS at 23:00

## 2022-12-08 RX ADMIN — ADENOSINE 6 MG: 3 INJECTION, SOLUTION INTRAVENOUS at 00:03

## 2022-12-08 RX ADMIN — FAMOTIDINE 20 MG: 10 INJECTION, SOLUTION INTRAVENOUS at 05:18

## 2022-12-08 RX ADMIN — SODIUM CHLORIDE, POTASSIUM CHLORIDE, SODIUM LACTATE AND CALCIUM CHLORIDE 1000 ML: 600; 310; 30; 20 INJECTION, SOLUTION INTRAVENOUS at 15:40

## 2022-12-08 RX ADMIN — ACETAMINOPHEN 1000 MG: 500 TABLET ORAL at 23:50

## 2022-12-08 RX ADMIN — OXYCODONE HYDROCHLORIDE 10 MG: 10 TABLET ORAL at 23:50

## 2022-12-08 RX ADMIN — DEXAMETHASONE SODIUM PHOSPHATE 4 MG: 4 INJECTION, SOLUTION INTRA-ARTICULAR; INTRALESIONAL; INTRAMUSCULAR; INTRAVENOUS; SOFT TISSUE at 18:17

## 2022-12-08 RX ADMIN — DOCUSATE SODIUM 50 MG AND SENNOSIDES 8.6 MG 1 TABLET: 8.6; 5 TABLET, FILM COATED ORAL at 20:47

## 2022-12-08 RX ADMIN — PHENYLEPHRINE HYDROCHLORIDE 2 MCG/KG/MIN: 10 INJECTION INTRAVENOUS at 15:36

## 2022-12-08 RX ADMIN — VANCOMYCIN HYDROCHLORIDE 1750 MG: 500 INJECTION, POWDER, LYOPHILIZED, FOR SOLUTION INTRAVENOUS at 18:37

## 2022-12-08 RX ADMIN — PHENYLEPHRINE HYDROCHLORIDE 3.5 MCG/KG/MIN: 10 INJECTION INTRAVENOUS at 03:27

## 2022-12-08 RX ADMIN — CEFAZOLIN 2 G: 2 INJECTION, POWDER, FOR SOLUTION INTRAMUSCULAR; INTRAVENOUS at 05:21

## 2022-12-08 RX ADMIN — DEXAMETHASONE SODIUM PHOSPHATE 4 MG: 4 INJECTION, SOLUTION INTRA-ARTICULAR; INTRALESIONAL; INTRAMUSCULAR; INTRAVENOUS; SOFT TISSUE at 23:51

## 2022-12-08 RX ADMIN — Medication 100 MCG/HR: at 05:23

## 2022-12-08 RX ADMIN — DEXMEDETOMIDINE 1.5 MCG/KG/HR: 200 INJECTION, SOLUTION INTRAVENOUS at 02:39

## 2022-12-08 RX ADMIN — OXYCODONE 5 MG: 5 TABLET ORAL at 20:45

## 2022-12-08 RX ADMIN — LEVETIRACETAM 500 MG: 100 INJECTION, SOLUTION INTRAVENOUS at 08:27

## 2022-12-08 RX ADMIN — DEXAMETHASONE SODIUM PHOSPHATE 4 MG: 4 INJECTION, SOLUTION INTRA-ARTICULAR; INTRALESIONAL; INTRAMUSCULAR; INTRAVENOUS; SOFT TISSUE at 05:18

## 2022-12-08 RX ADMIN — LEVETIRACETAM 500 MG: 100 INJECTION, SOLUTION INTRAVENOUS at 18:17

## 2022-12-08 RX ADMIN — DOCUSATE SODIUM 100 MG: 100 CAPSULE, LIQUID FILLED ORAL at 18:16

## 2022-12-08 RX ADMIN — POLYETHYLENE GLYCOL 3350 1 PACKET: 17 POWDER, FOR SOLUTION ORAL at 18:16

## 2022-12-08 RX ADMIN — PHENYLEPHRINE HYDROCHLORIDE 3.5 MCG/KG/MIN: 10 INJECTION INTRAVENOUS at 10:54

## 2022-12-08 RX ADMIN — PHENYLEPHRINE HYDROCHLORIDE 0.5 MCG/KG/MIN: 10 INJECTION INTRAVENOUS at 00:20

## 2022-12-08 RX ADMIN — DEXMEDETOMIDINE 1 MCG/KG/HR: 200 INJECTION, SOLUTION INTRAVENOUS at 07:53

## 2022-12-08 RX ADMIN — PHENYLEPHRINE HYDROCHLORIDE 3.5 MCG/KG/MIN: 10 INJECTION INTRAVENOUS at 05:01

## 2022-12-08 ASSESSMENT — PAIN DESCRIPTION - PAIN TYPE
TYPE: ACUTE PAIN

## 2022-12-08 NOTE — ASSESSMENT & PLAN NOTE
SIRS criteria identified on my evaluation include:  Fever, with temperature greater than 101 deg F, Tachycardia, with heart rate greater than 90 BPM, Tachypnea, with respirations greater than 20 per minute and Leukocytosis, with WBC greater than 12,000  Without clear source  Continue Abx  F/U cultures  Trend lactate  IVF PRN and vasopressors

## 2022-12-08 NOTE — PROGRESS NOTES
Critical Care Progress Note      History of Presenting Illness  57 y.o. male admitted 11/30/22 with large meningioma.  Underwent preoperative emobolization 12/6/22, on 12/7/22 he underwent operative resection with neurosurgery.      Seen post operatively.  He is intubated with plans to remain so overnight.      Code Status  Full Code    Review of Systems  Review of Systems   Unable to perform ROS: Intubated     Vitals:    12/07/22 1936   BP:    Pulse: (!) 110   Resp: 17   Temp:    SpO2: 100%         Physical Exam   Physical Exam  Vitals and nursing note reviewed. Exam conducted with a chaperone present.   Constitutional:       General: He is not in acute distress.     Interventions: He is sedated and intubated.   HENT:      Head: Normocephalic.      Comments: Drain in place to hemovac     Mouth/Throat:      Mouth: Mucous membranes are moist.   Eyes:      Extraocular Movements: Extraocular movements intact.   Cardiovascular:      Rate and Rhythm: Regular rhythm. Tachycardia present.      Pulses: Normal pulses.   Pulmonary:      Effort: Pulmonary effort is normal. He is intubated.   Abdominal:      General: There is no distension.      Palpations: Abdomen is soft.      Tenderness: There is no abdominal tenderness. There is no guarding or rebound.   Musculoskeletal:         General: Normal range of motion.      Cervical back: Normal range of motion and neck supple.   Skin:     General: Skin is warm and dry.      Capillary Refill: Capillary refill takes less than 2 seconds.   Neurological:      Mental Status: He is alert.      Comments: RASS -5           Assessment/Plan  * Brain mass- (present on admission)  Assessment & Plan  Patient presented to outside facility with headache x2 months  No family history  MRI brain here showed 6.7 x 4.7 x 5.9 cm size enhancing extra-axial mass noted bilateral mid posterior parietal region with 6 mm midline shift  Neurosurgery consulted and started on Keppra and steroids.  Plan for  surgical intervention 12/7  IR embolization 12/6  To OR today    On mechanically assisted ventilation (HCC)  Assessment & Plan  Intubated 12/7 for OR  Lung protective ventilation strategy  GI prophylaxis: H2 blocker  Daily awakening and SBT trials unless contraindicated  ABCDEF Bundle     Hyponatremia- (present on admission)  Assessment & Plan  Stable  Mild  Follow BMP    Hyperglycemia- (present on admission)  Assessment & Plan  On steroids, no known history of DM2  A1c 6.4  Monitor  Goal 140-180    Hypertension- (present on admission)  Assessment & Plan  On losartan as outpatient  As needed IV antihypertensives with parameters <160 mmHg    Discussed patient condition and risk of morbidity and/or mortality with RN, RT, Pharmacy, and neurosurgery.      The patient remains critically ill.  Critical care time = 35 minutes in directly providing and coordinating critical care and extensive data review.  No time overlap and excludes procedures.

## 2022-12-08 NOTE — OP REPORT
Surgeon Dieudonne Donis  First assist Halye Tenorio    Preoperative diagnosis was prior occipital invasive extra axial intracranial tumor causing significant mass-effect   Postoperative diagnosis was same with likely preliminary diagnosis of meningioma    Brief HPI this is a gentleman who presented to the ER with a mass after complaining of having visual acuity and decreased on the CT scan he showed a mass inducing or invading into the skull as well as the superior sagittal sinus he was transferred to [facility] with an MRI which demonstrated a likely highly vascular lesion that was dural based causing substantial expansion of the patient's bone as well as invasion into the bone and the parietal occipital region he was sent for preoperative angiogram to diagnose whether or not the superior sagittal sinus was patent at all it was determined that it was not there is also significant vascular pedicles 3 of which were able to be embolized at the time of his angiogram the patient was remained with significant vascular pedicles including substantial blood flow through the bone to the osseous or intraosseous components.    Consent was obtained from the patient apprising risk complications occasions surgery which included not limited to stroke need for more surgery and hematoma.    Procedures  Use of Stealth stereotactic guidance for resection of lesion as well as planning of craniotomy  Bilateral parietal occipital craniectomy for resection of invasive intradural extra-axial mass  Bilateral parieto-occipital craniectomy for resection of invasive intraosseous meningioma  Mesh cranioplasty using preformed Blair hemicraniectomy implant to reconstruct the patient's cranial defect postsurgically  Use of intraoperative motor evoked and sensory evoked potentials  Modifier 22 this case was at least 50% more difficult than the average patient had extensive intraosseous expansion measuring greater than 3 cm of  thickness of is of expansion of greater than 14 cm upright occipital bone running superior to the patient's superior sagittal sinus this required a substantial amount of time to remove the bone and the patient lost approximately 1 L of blood in the process of opening the flap and getting the bone off which had parasitized blood from the rest of the residual of the skull additionally once the bone was off this patient had extensive invasion into the superior sagittal sinus requiring resection of the sinus at the posterior one third of the sinus this was dopplered at the time of the surgery it was not flowing additionally the patient had substantial cortical parasitized vessels causing significant vascularity of the tumor requiring a large amount of time for meticulous dissection and ligation and removal losing another liter in the process making this take a extremely long time for the overall size of the intradural component of the tumor.    Complications none  Specimen permanent specimen was sent of the tumor    Procedure in detail  Patient was brought into the operating room where he was intubated he then had appropriate lines placed.  We then placed the patient Ramon head montoya and then rotated him into a prone position where the most prominent aspect of the skull was the expansion of the skull we then registered the patient Stealth navigation and plan a U-shaped flap on the back of the head we then from surgical timeout confirmed the correct side site procedure patient with all faculty and staff agreeing we then clipped prepped and draped patient sterile usual fashion for the preplanned U-shaped incision.    We then infiltrated the patient's scalp with lidocaine with epinephrine and then used a 10 blade sure scalpel to incise down the paracranium and then placed Christopher clips along the wound expanding our incision while we placed the Christopher clips to avoid excessive blood loss with a U-shaped incision to be  completely would have been completed we then with the pedicle towards the patient's neck we then rotated the scalp inferiorly and then tacked in place using a rubberbanded stitch and the Rodriguez retractor during this process it was very evident the patient's scalp had fistulized into the bone through the expansion of the interosseous component of the tumor during this process we were very careful not to violate the skin but it had to be removed from the tumor and the areas that had vascular fistulas were bipolared and then the a lateral lap was placed in the fold of skin to prevent it from having continuous bleeding during the case once the scalp flap was pedicle lysed inferiorly and we had the entirety of the expanded bone exposed we then brought the Stealth navigation into ensure that the tumor margins were identified as well as the superior sagittal sinus was identified once we had these traced out we then used an acorn bur to circumnavigate around the intraosseous expansion of the lesion during this process there was exposed extremely large amounts of bleeding every few centimeters we stopped to follow to relax the bone edges to prevent the fistulous tracks through the bone from continuing to bleed once we had circumnavigated the tumor we then checked for a total blood loss at this point we lost a liter of blood we paused for approximately 15 or 20 minutes to allow for anesthesia to catch up for resuscitation and during this process after we had completely skeletonized down to the level of the dura the circumnavigated portion of bone we then checked her motor and sensory evoked potentials to ensure that there was no changes due to cutting off the first layer of vascularity from the scalp and then the second layer of vascularity of the tumor being through the bone at this point the motor and sensory evoked potentials were stable with no concern for drop in any signals.    At this point we then dissected  straight down the middle of the bone flap over the superior sagittal sinus remnant we were then able to rotate out a large portion of the right parieto-occipital bone that had been freed and then we rotated out the left side away from the dura at this point we had the dura exposed and we bipolared until there was appropriate hemostasis with no further residual bleeding we also placed epidural tack ups at this time to prevent any epidural blood running we then brought in a Doppler and Doppler at the superior sagittal sinus both above and below the noted aspects of the tumor based on the Stealth navigation we did not see any patency or here in any flow through the superior sagittal sinus which had also been identified is completely occluded during the angio given this finding we therefore opened up the left side of the dura lateral to the patient's superior sagittal sinus invasion directly over the tumor and then began hollowing out the tumor at this time it was very evident that the tumor was still highly vascularized and it was still parasitizing blood flow through the falx cerebri as well as the deep cortical vessels which had not been embolized during the patient's angiogram.  Because of this we continued to use a combination of the Cassia as well as the bipolar to remove significant portion of the central aspect of the left expansile dural based mass going into the left parieto-occipital lobe while we were doing this we were trying to keep as much hemostasis as possible we continue to remove is much as possible however it was evident that we had to continue to expand our dural opening once we reached the dural margins there was a thin rind of brain tissue that was over the tumor this had to be sacrificed as it was appear to be very thinly stretched and was approximately a millimeter in thickness because of this it would not make it feasible to remove the tumor without resecting part of this cortical surface once  we had the cortical surface of the way we were able to further dissect around the edges of the tumor but there is a very poor tumor peel interface there was not easily dissected away and the brain was very edematous and macerated around the the edges of the tumor because of this we tried to work as quickly as possible manipulating the surface of the brain as little as possible however we noted that there would be not a feasible arachnoid in the arachnoid plane because of that we used fibular balls along with half by 3 patties to try to keep her planes to avoid sucker wiping the surface we continue to work the left side of the tumor continue to debulk internally and work the perimeter there were very large cortical veins that and parasitized cortical vessels that had been dilated these were directly going into the tumor and feeding it we therefore bipolared these and then ligated and cut them once we had worked approximately half the way around the tumor became not feasible to continue to work within the tumor itself without further devascularizing it we therefore then opened up the right side of the tumor attempted to do the same process of debulking followed by resecting tumor down and coring it out during this process we then decided that it would be most reasonable to resect a significant portion of the superior sagittal sinus as it had been inundated and there is still no demonstration of any flow through the superior sagittal sinus given that factor we then bipolar down the midline cut through the superior sagittal sinus slowly to see that there improved that there was no flow through the the superior sagittal sinus and there is no patency there was some tumor bleeding but this was not the same as is as a centralized lumen given that finding we cut all the way down to the center of the falx cerebri and then we began advancing both proximally and distally along the superior sagittal sinus removing chunks of it  until he reached the anterior or most proximal aspect of the superior sagittal sinus that bordered the tumor on the left and the right side allowing us to get a margin in the anterior aspect we then we did the exact same thing on the distal or posterior aspect of our resection cutting backwards on the superior sagittal sinus until we reached the posterior margin left and the right side in doing so we cut a U-shaped portion of the superior sagittal sinus and falx cerebri out allowing us to have access to the central and deep portions of the patient's tumor we continue to work the margins of the tumor devascularizing both on the outside as well as [place] the mesial surface of the tumor rolling it and on itself the right side of the falx cerebri were able to remove all of the tumor down to the falx cerebri is deep portions we left the deep aspects of the falx cerebri to ensure that we did not hurt the straight or the inferior sagittal sinus which may have been a large part of the collateral blood flow going around the sinus itself we then remove the residual of the tumor on the left side there was some very large parasitized vessels from the cord cortical surface on the deepest aspect of the tumor these again were bipolared and then ligated and cut at this point we had removed however in our opinion the entirety of the soft portion of the tumor that we could identify on both the left and the right side of the falx cerebri there was some small residual that was likely in the thickened dura at the margins of the cortical surface and there was likely some small residual there was within the sagittal sinus remanent both proximal and distal plugging the sinus so that we would not get back bleeding we did not jonn this as there was little utility in it and it could have impacted the patient's cortical drainage of the cortical surface and therefore was deemed inappropriate at this time we continue to run motor and sensory  evoked potentials to ensure that there is no evidence of any motor or sensory deficit which there had not been and at this time we determined that the patient had a near gross total resection at this point we dried the macerated surface of the brain was not a good surgical plane from a technical standpoint and there were some areas of significant peel intrusion due to both the parasitized vessels that retracted as well as the lack of a decent border between the severely edematous brain surface and the tumor itself we therefore aligned this area with Surgicel did we did place some thrombin Gelfoam to the inferior aspect of the wound once it was dry to the point where there was no more bleeding we then checked the dural surface ensure that there was no bleeding from there irrigated the wound out thoroughly bacitracin irrigation placed a piece of DuraGen over the defect used adheres glue to make sure this did not shift and then used a preformed hemicraniectomy cranioplasty from [employer] and used the rounded parietal region cut to size to complete a cranioplasty for the patient as there was no bone to return to the wound this cranioplasty was greater than 14 cm it did have good contouring at this point we placed a medium Hemovac and then we closed the wound in layers using 0 Vicryl's on the galea followed by surgical staples.  At the very end of the case there was a sensory and motor evoked potential it was run this was stable.    All counts were correct x2  My nurse practitioner/physician assistant was necessary she assisted with opening closure resection directly of the tumor and assistance with hemostasis greatly expedited the patient's case.    This case deserves a modifier 22 for both resection of the intraosseous tumor as well as resection of the intracranial intradural extra axial mass as it was extremely vascular requiring substantial amount of time for hemostasis losing approximately 2 L of blood during the case  1 L for the room opening as well as removal of the bone and a second liter estimated on the time of the resection due to how vascular the parasitized vessels were from the cortical surface.    Dieudonne Donis MD

## 2022-12-08 NOTE — ANESTHESIA POSTPROCEDURE EVALUATION
Patient: Ambrosio Hardy    Procedure Summary     Date: 12/07/22 Room / Location: [facility] 05 / SURGERY [facility]    Anesthesia Start: 1208 Anesthesia Stop: 1855    Procedure: STEALTH BILATERAL PARIETAL OCCIPITAL CRANIECTOMY FOR RESECTION OF BRAIN LESION AND MESH CRANIOPLASTY USING NEUROMONITORING (Bilateral: Head) Diagnosis: (brain mass )    Surgeons: Dieudonne Donis M.D. Responsible Provider: Jasvir Klein M.D.    Anesthesia Type: general ASA Status: 2          Final Anesthesia Type: general  Last vitals  BP   Blood Pressure: 139/83    Temp   36.2 °C (97.1 °F)    Pulse   (!) 57   Resp   16    SpO2   98 %      Anesthesia Post Evaluation    Patient location during evaluation: ICU  Patient participation: complete - patient cannot participate  Level of consciousness: sleepy but conscious and obtunded/minimal responses  Pain score: 0    Airway patency: patent  Anesthetic complications: no  Cardiovascular status: hemodynamically stable  Respiratory status: acceptable, ETT and ventilator  Hydration status: euvolemic    PONV: none          Encounter Notable Events   Notable Event Outcome Phase Comment   Unanticipated transfusion  Intraprocedure         Nurse Pain Score: 0 (NPRS)

## 2022-12-08 NOTE — PROGRESS NOTES
Pulmonary/Critical Care Medicine  Progress Note    Patient spiked a fever from 101 to 102.5 WBC from 32 to 41.  Currently on n phenylephrine IV infusion for BP support. Precedex and Fentanyl for comfort/sedation, IV Dexamethasone and Ancef (post op abx). Received 2 units of PRBC post op. Current vital signs HR : 80 to 90 bpm in SR.  -130/80 with map 90. Intubated/vented. 40% Fi02.    Plan  Obtain panculture (Blood culture) UA,  Portable Chest xray  Normothermia protocol initiated  Tylenol 1000 mg every 6hrs  Empiric antibiotic therapy      Updated Dr Velasquez, recommended blood culture, UA chest xray and empiric antibiotics  Discussed choice of empiric antibiotic therapy with pharmacy.     Patient remained in critical condition with imminent risk of deterioration/death  A total of 30 minutes critical care time, excluding procedures and no time overlap    Tala Alvarado APRORENAtrium Health

## 2022-12-08 NOTE — PROGRESS NOTES
Neurosurgery Progress Note    Subjective:  No events     Exam:  Neuro stable on vent this am MAEW to command   HVC >100    BP  Min: 62/45  Max: 152/80  Pulse  Av.7  Min: 43  Max: 170  Resp  Av.4  Min: 6  Max: 42  Monitored Temp 2  Av.4 °C (97.6 °F)  Min: 33.2 °C (91.8 °F)  Max: 39.3 °C (102.7 °F)  SpO2  Av.3 %  Min: 88 %  Max: 100 %    No data recorded    Recent Labs     22  0519 22  0020 22  0333   WBC 9.6 32.9* 41.8*   RBC 4.80 4.55* 4.33*   HEMOGLOBIN 14.7 13.8* 13.4*   HEMATOCRIT 42.5 39.7* 38.3*   MCV 88.5 87.3 88.5   MCH 30.6 30.3 30.9   MCHC 34.6 34.8 35.0   RDW 44.6 46.5 46.9   PLATELETCT 145* 134* 113*   MPV 9.7 10.2 10.1     Recent Labs     22  0519 22  0020 22  0333   SODIUM 133* 135 134*   POTASSIUM 4.4 4.9 5.5   CHLORIDE 101 104 107   CO2 21 17* 19*   GLUCOSE 160* 263* 259*   BUN 22 24* 23*   CREATININE 0.57 0.87 0.81   CALCIUM 8.8 8.3* 8.0*     Recent Labs     22  1530   APTT 26.2   INR 1.02           Intake/Output                         22 - 2259 22 - 22 0659      Total [id number] Total                 Intake    P.O.  --  -- --  600  -- 600    P.O. -- -- -- 600 -- 600    I.V.  5500  2720.9 8220.9  831.2  -- 831.2    Fentanyl Volume -- 26.6 26.6 5 -- 5    Precedex Volume -- 161 161 48.6 -- 48.6    Phenylephrine Volume -- 536.9 536.9 499.7 -- 499.7    Propofol Volume -- 478.2 478.2 -- -- --    Norepinephrine Volume -- 267.8 267.8 0 -- 0    Volume (mL) (dextrose 5 % and 0.9 % NaCl with KCl 20 mEq infusion) -- 917.4 917.4 277.9 -- 277.9    Volume (mL) (NS (BOLUS) infusion 1,000 mL) -- 333 333 -- -- --    Volume (mL) (Lactated Ringers) 2500 -- 2500 -- -- --    Volume (mL) (electrolyte-A (PLASMALYTE-A) infusion) 1000 -- 1000 -- -- --    Volume (mL) (NS infusion) 2000 -- 2000 -- -- --    Blood  1600  -- 1600  --  -- --    Volume (RELEASE FRESH FROZEN PLASMA (UNITS)) 150 -- 150 -- --  --    Volume (RELEASE FRESH FROZEN PLASMA (UNITS)) 150 -- 150 -- -- --    Volume (RELEASE RED BLOOD CELLS) 250 -- 250 -- -- --    Volume (RELEASE RED BLOOD CELLS) 250 -- 250 -- -- --    Volume (RELEASE FRESH FROZEN PLASMA (UNITS)) 150 -- 150 -- -- --    Volume (RELEASE FRESH FROZEN PLASMA (UNITS)) 150 -- 150 -- -- --    Volume (RELEASE RED BLOOD CELLS) 250 -- 250 -- -- --    Volume (RELEASE RED BLOOD CELLS) 250 -- 250 -- -- --    IV Piggyback  --  197.5 197.5  498.1  -- 498.1    Volume (mL) (ceFAZolin (Ancef) 2 g in  mL IVPB) -- 197.5 197.5 0 -- 0    Volume (mL) (ampicillin/sulbactam (UNASYN) 3 g in  mL IVPB) -- -- -- 0 -- 0    Volume (mL) (vancomycin (VANCOCIN) 2,250 mg in  mL IVPB) -- -- -- 498.1 -- 498.1    Volume (mL) (vancomycin (VANCOCIN) 1,750 mg in  mL IVPB) -- -- -- 0 -- 0    Total Intake 7100 2918.5 31350.5 1929.2 -- 1929.2       Output    Urine  1550  850 2400  505  -- 505    Urine 1200 -- 1200 -- -- --    Number of Times Voided 0 x -- 0 x -- -- --    Urine Void (mL) 350 -- 350 -- -- --    Output (mL) (Urethral Catheter Non-latex;Temperature probe 16 Fr.) -- 850 850 505 -- 505    Drains  --  380 380  40  -- 40    Output (mL) (Closed/Suction Drain Inferior Scalp Hemovac) -- 380 380 40 -- 40    Stool  --  -- --  --  -- --    Number of Times Stooled 0 x -- 0 x 0 x -- 0 x    Blood  2000 -- 2000  --  -- --    Est. Blood Loss 2000 -- 2000 -- -- --    Total Output 3550 1230 4780 545 -- 545       Net I/O     3550 1688.5 5238.5 1384.2 -- 1384.2              Intake/Output Summary (Last 24 hours) at 12/8/2022 1559  Last data filed at 12/8/2022 1536  Gross per 24 hour   Intake 6997.69 ml   Output 4975 ml   Net 2022.69 ml             phenylephrine infusion  0-5 mcg/kg/min (Ideal) Continuous    Pharmacy   PHARMACY TO DOSE    acetaminophen  1,000 mg Q6HRS    cefTRIAXone (ROCEPHIN) IV  2,000 mg Q12HRS    MD Alert...Vancomycin per Pharmacy   PHARMACY TO DOSE    vancomycin  20 mg/kg Q12HR     levETIRAcetam (Keppra) IV  500 mg Q12HRS    Pharmacy Consult Request  1 Each PHARMACY TO DOSE    MD ALERT...DO NOT ADMINISTER NSAIDS or ASPIRIN unless ORDERED By Neurosurgery  1 Each PRN    ondansetron  4 mg Q4HRS PRN    dexamethasone  4 mg Once PRN    diphenhydrAMINE  25 mg Q6HRS PRN    scopolamine  1 Patch Q72HRS PRN    labetalol  10 mg Q HOUR PRN    hydrALAZINE  10 mg Q HOUR PRN    cloNIDine  0.1 mg Q4HRS PRN    docusate sodium  100 mg BID    senna-docusate  1 Tablet Nightly    senna-docusate  1 Tablet Q24HRS PRN    polyethylene glycol/lytes  1 Packet BID PRN    magnesium hydroxide  30 mL QDAY PRN    bisacodyl  10 mg Q24HRS PRN    sodium phosphate  1 Each Once PRN    artificial tears  1 Application Q8HRS    oxyCODONE immediate-release  5 mg Q3HRS PRN    Or    oxyCODONE immediate-release  10 mg Q3HRS PRN    Or    HYDROmorphone  0.5 mg Q3HRS PRN    diphenhydrAMINE  25 mg Q6HRS PRN    Or    diphenhydrAMINE  25 mg Q6HRS PRN    benzocaine-menthol  1 Lozenge Q2HRS PRN    famotidine  20 mg Q12HRS    Or    famotidine  20 mg Q12HRS    MD Alert...Adult ICU Electrolyte Replacement per Pharmacy   PHARMACY TO DOSE    NORepinephrine  0-1 mcg/kg/min (Ideal) Continuous    vasopressin (PITRESSIN) infusion  0.03 Units/min Continuous    dexamethasone  4 mg Q6HRS    Respiratory Therapy Consult   Continuous RT    ondansetron  4 mg Q4HRS PRN    promethazine  12.5-25 mg Q4HRS PRN    promethazine  12.5-25 mg Q4HRS PRN    prochlorperazine  5-10 mg Q4HRS PRN       Assessment and Plan:  Hospital day # 1  POD# 1  Chemical prophylactic DVT therapy: No  Start date/time: tbd    Ct head stable  Wean to extubate   Keppra 500 BID  Dex  MRI pending   [facility]   Gagandeep

## 2022-12-08 NOTE — ASSESSMENT & PLAN NOTE
Intubated 12/7 for OR  RT/O2 protocols  Daily and PRN ABGs  Titration of ventilator therapy based on ABGs and patient's status  Sedation as tolerated/indicated  Daily CXR  HOB >30 degrees and peridex for VAP prevention  Pepcid for GI prophylaxis  SAT/SBT when able (ABCDEF Bundle)  Early mobility    Trial Vent liberation today

## 2022-12-08 NOTE — PROGRESS NOTES
Patient core temp and temporal temperature low. Barehugger and heat packs applied. Dr. Sidhu made aware at 1000.

## 2022-12-08 NOTE — PROCEDURES
Central Line Insertion    Date/Time: 12/7/2022 8:34 PM  Performed by: SAWYER Barajas  Authorized by: SAWYER Barajas     Consent:     Consent obtained:  Verbal, written and emergent situation    Consent given by:  Healthcare agent    Risks discussed:  Arterial puncture, bleeding, incorrect placement, infection, pneumothorax and nerve damage    Alternatives discussed:  Delayed treatment and alternative treatment  Universal protocol:     Immediately prior to procedure, a time out was called: yes      Patient identity confirmed:  Arm band and anonymous protocol, patient vented/unresponsive  Pre-procedure details:     Hand hygiene: Hand hygiene performed prior to insertion      Sterile barrier technique: All elements of maximal sterile technique followed      Skin preparation:  ChloraPrep    Skin preparation agent: Skin preparation agent completely dried prior to procedure    Sedation:     Sedation type:  Deep  Anesthesia:     Anesthesia method:  Local infiltration    Local anesthetic:  Lidocaine 1% w/o epi  Procedure details:     Location:  R internal jugular    Patient position:  Flat    Procedural supplies:  Triple lumen    Catheter size:  7 Fr    Ultrasound guidance: yes      Sterile ultrasound techniques: Sterile gel and sterile probe covers were used      Number of attempts:  1    Successful placement: yes    Post-procedure details:     Post-procedure:  Dressing applied and line sutured    Guidewire: guidewire removal confirmed      Guidewire comment:  Intact    Assessment:  Blood return through all ports, no pneumothorax on x-ray, free fluid flow and placement verified by x-ray    Patient tolerance of procedure:  Tolerated well, no immediate complications  Comments:      S/P brain tumor resection. Intubated, vented and sedated. On propofol and PRN IVP fentanyl for comfort/sedation. Hypotensive on Levophed at 0.2 mcg/kg/min. Requires CVC placement for pressors. Obtained informed consent from  Chief Complaint   Patient presents with   • Routine Prenatal Visit       HPI: 34 y.o.  at 17w2d gestation  She is doing well  Nausea mostly resolved  Uses zofran occly  Waiting for energy levels to come back  Has started feeling FM  No c/o  Did want to note that she was induced with first baby (1 wk late)  Has anatomy scan scheduled next month already    Vitals:    21 1057   BP: 112/74   Weight: 67.1 kg (148 lb)       ROS:  GI:  Negative  : na  Pulmonary: Negative     A/P  1. Intrauterine pregnancy at 17w2d   2. Pregnancy Risk:  NORMAL    Diagnoses and all orders for this visit:    1. Screening for blood or protein in urine (Primary)  -     POC Urinalysis Dipstick, Multipro    2. 17 weeks gestation of pregnancy  -     POC Urinalysis Dipstick, Multipro        -----------------------  PLAN:   Return in about 3 weeks (around 2021) for 3-4 wks for anatomy scan as scheduled.      CINDY Choi  2021 11:09 EDT     the brother.   Discussed with Dr Ruiz about CVC placement.

## 2022-12-08 NOTE — PROGRESS NOTES
"Critical Care Progress Note    Date of admission  11/30/2022    Chief Complaint  57 y.o. male admitted 11/30/2022 with intracranial mass    Hospital Course  Ambrosio Hardy is a 57-year-old male who presented 11/30/2022 with headache x2 months.  He describes the pain located in the back of his head 6-7/10.  He presented to [facility] in [facility] where CT showed occipital mass with shift.  He was sent here for neurosurgery evaluation.  He was evaluated by Dr. Donis who started him on Decadron and Keppra.  CT chest/abdomen/pelvis showed no evidence of metastasis.  Dr. oDnis plans on surgery Wednesday 12/7 and recommended preoperative angiogram for possible embolization.  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  These vessels were embolized with 150-300 UM particles and Gelfoam\" (Dr. Mcdonald procedure note 12/5).     Interval Problem Update  Reviewed last 24 hour events:   - Went to OR yesterday, large amount of blood loss. Received multiple units intraoperatively (4 pRBCs, 4 FFP)   - febrile overnight, pancultured and started on abx   - SVT   - Neuro: AOx4   - HR: 60s-90s   - SBP: 70s-110s   - GI: NPO post-op, last BM PTA (advance bowel protocol)   - UOP: 2.4 L/24 hrs   - Tsai: yes   - Tm: 39.3   - Lines: CVC   - PPx: GI pepcid, DVT not indicated   - ABG: looks good   - VD #2   - SBT: VC: 2.3, NIF -48, RSBI 20s   - CXR (personally reviewed and compared to prior): B/L infiltrates   - Mobility level 1, eligible for progression yes    Trial vent liberation    Yesterday              - No acute events overnight              - Neuro: AOx4,              - HR: 50s-90s              - SBP: 100s-110s              - GI: NPO for surgery, last BM PTA              - UOP: 2.15 L/24 hrs              - Tsai: no              - Tm: 37.1              - Lines: PIV              - PPx: GI not indicated, DVT not indicated              - RA              - CXR (personally " reviewed and compared to prior): No acute              - To OR this AM, not examined due to being in OR       Review of Systems  Review of Systems   Unable to perform ROS: Intubated      Vital Signs for last 24 hours   Temp:  [36.2 °C (97.1 °F)-36.7 °C (98.1 °F)] 36.2 °C (97.1 °F)  Pulse:  [] 68  Resp:  [8-42] 8  BP: ()/(45-86) 109/82  SpO2:  [96 %-100 %] 97 %    Hemodynamic parameters for last 24 hours  CVP:  [0 MM HG-303 MM HG] 297 MM HG    Respiratory Information for the last 24 hours  Vent Mode: APVCMV  Rate (breaths/min): 16  Vt Target (mL): 440  PEEP/CPAP: 8  MAP: 13  Control VTE (exp VT): 398    Physical Exam   Physical Exam  Vitals and nursing note reviewed.   Constitutional:       General: He is in acute distress.      Appearance: He is well-developed. He is ill-appearing.      Interventions: He is intubated.   HENT:      Head:      Comments: Head surgical dressing C/D/I     Right Ear: External ear normal.      Left Ear: External ear normal.      Mouth/Throat:      Comments: ETT in place  Eyes:      Conjunctiva/sclera: Conjunctivae normal.      Pupils: Pupils are equal, round, and reactive to light.   Neck:      Vascular: No JVD.      Trachea: No tracheal deviation.   Cardiovascular:      Rate and Rhythm: Normal rate and regular rhythm.      Pulses: Normal pulses.   Pulmonary:      Effort: He is intubated.      Breath sounds: Rales present. No wheezing.   Abdominal:      General: Bowel sounds are normal. There is no distension.      Palpations: Abdomen is soft.   Musculoskeletal:         General: No tenderness.      Cervical back: Neck supple.   Skin:     General: Skin is warm and dry.      Capillary Refill: Capillary refill takes less than 2 seconds.      Findings: No rash.   Neurological:      General: No focal deficit present.      Mental Status: He is alert.      Cranial Nerves: No cranial nerve deficit.      Sensory: No sensory deficit.      Motor: No weakness.       Medications  Current  Facility-Administered Medications   Medication Dose Route Frequency Provider Last Rate Last Admin    phenylephrine (BEATRIS-SYNEPHRINE) 40 mg in  mL infusion  0-5 mcg/kg/min (Ideal) Intravenous Continuous David Gerard M.D. 95.8 mL/hr at 12/08/22 0753 3.5 mcg/kg/min at 12/08/22 0753    PHENYLEPHRINE HCL-NACL 1-0.9 MG/10ML-% IV SOS             Pharmacy Consult: pharmacy to discontinue all other orders for acetaminophen   Other PHARMACY TO DOSE Tala Alvarado A.P.R.N.        acetaminophen (TYLENOL) tablet 1,000 mg  1,000 mg Oral Q6HRS Tala Alvarado, A.P.R.N.        busPIRone (BUSPAR) tablet 15 mg  15 mg Oral BID Tala Alvarado, A.P.R.N.        meperidine (DEMEROL) injection 25 mg  25 mg Intravenous Q HOUR PRN Tala Alvarado A.P.R.N.        cefTRIAXone (Rocephin) syringe 2,000 mg  2,000 mg Intravenous Q12HRS Tala Alvarado A.P.R.N.        MD Alert...Vancomycin per Pharmacy   Other PHARMACY TO DOSE Tala Alvarado A.P.R.N.        vancomycin (VANCOCIN) 2,250 mg in  mL IVPB  25 mg/kg Intravenous Once Tala Alvarado A.P.R.N. 166.7 mL/hr at 12/08/22 0639 2,250 mg at 12/08/22 0639    vancomycin (VANCOCIN) 1,750 mg in  mL IVPB  20 mg/kg Intravenous Q12HR Tala Alvarado, A.P.R.N.        Pharmacy Consult Request ...Pain Management Review 1 Each  1 Each Other PHARMACY TO DOSE Haley Tenorio P.A.-C. MD ALERT...DO NOT ADMINISTER NSAIDS or ASPIRIN unless ORDERED By Neurosurgery 1 Each  1 Each Other PRN Haley Tenorio P.A.-C.        ondansetron (ZOFRAN) syringe/vial injection 4 mg  4 mg Intravenous Q4HRS PRN Haley Tenorio P.A.-C.        dexamethasone (DECADRON) injection 4 mg  4 mg Intravenous Once PRN Haley Tenorio P.A.-C.        diphenhydrAMINE (BENADRYL) injection 25 mg  25 mg Intravenous Q6HRS PRN Haley Tenorio P.A.-C.        scopolamine (TRANSDERM-SCOP) patch 1 Patch  1 Patch Transdermal Q72HRS PRN Haley Tenorio P.A.-C.        labetalol (NORMODYNE/TRANDATE)  injection 10 mg  10 mg Intravenous Q HOUR PRN LAN Saenz.A.-C.        hydrALAZINE (APRESOLINE) injection 10 mg  10 mg Intravenous Q HOUR PRN LAN Saenz.A.-C.        cloNIDine (CATAPRES) tablet 0.1 mg  0.1 mg Oral Q4HRS PRN LAN Saenz.A.-C.        docusate sodium (COLACE) capsule 100 mg  100 mg Oral BID LAN Saenz.A.-C.        senna-docusate (PERICOLACE or SENOKOT S) 8.6-50 MG per tablet 1 Tablet  1 Tablet Oral Nightly LAN Saenz.A.-C.        senna-docusate (PERICOLACE or SENOKOT S) 8.6-50 MG per tablet 1 Tablet  1 Tablet Oral Q24HRS PRN LAN Saenz.A.-C.        polyethylene glycol/lytes (MIRALAX) PACKET 1 Packet  1 Packet Oral BID PRN LAN Saenz.A.-C.        magnesium hydroxide (MILK OF MAGNESIA) suspension 30 mL  30 mL Oral QDAY PRN LAN Saenz.A.-C.        bisacodyl (DULCOLAX) suppository 10 mg  10 mg Rectal Q24HRS PRN LAN Saenz.A.-C.        sodium phosphate (Fleet) enema 133 mL  1 Each Rectal Once PRN LAN Saenz.A.-C.        artificial tears (EYE LUBRICANT) ophth ointment 1 Application  1 Application Both Eyes Q8HRS LAN Saenz.A.-C.        dextrose 5 % and 0.9 % NaCl with KCl 20 mEq infusion   Intravenous Continuous LAN Saenz.A.-C. 100 mL/hr at 12/08/22 0644 New Bag at 12/08/22 0644    oxyCODONE immediate-release (ROXICODONE) tablet 5 mg  5 mg Oral Q3HRS PRN LAN Saenz.A.-C.        Or    oxyCODONE immediate release (ROXICODONE) tablet 10 mg  10 mg Oral Q3HRS PRN Haleyelpidio Tenorio P.A.-C.        Or    HYDROmorphone (Dilaudid) injection 0.5 mg  0.5 mg Intravenous Q3HRS PRN Haley Tenorio P.A.-C.        diphenhydrAMINE (BENADRYL) tablet/capsule 25 mg  25 mg Oral Q6HRS PRN Haley Tenorio P.A.-C.        Or    diphenhydrAMINE (BENADRYL) injection 25 mg  25 mg Intravenous Q6HRS PRN Haley Tenorio P.A.-C.        benzocaine-menthol (Cepacol) lozenge 1 Lozenge  1 Lozenge  Mouth/Throat Q2HRS PRN Haley Tenorio P.A.-C.        fentaNYL (SUBLIMAZE) injection 100 mcg  100 mcg Intravenous Q15 MIN PRN Korey Ruiz M.D.   100 mcg at 12/07/22 2046    And    fentaNYL (SUBLIMAZE) injection 200 mcg  200 mcg Intravenous Q15 MIN PRN Korey Ruiz M.D.   200 mcg at 12/07/22 2326    And    fentaNYL (SUBLIMAZE) 50 mcg/mL in 50mL (Continuous Infusion)   Intravenous Continuous Korey Ruiz M.D. 2 mL/hr at 12/08/22 0703 100 mcg/hr at 12/08/22 0703    famotidine (PEPCID) tablet 20 mg  20 mg Enteral Tube Q12HRS Korey Ruiz M.D.        Or    famotidine (PEPCID) injection 20 mg  20 mg Intravenous Q12HRS Korey Ruiz M.D.   20 mg at 12/08/22 0518    MD Alert...ICU Electrolyte Replacement per Pharmacy   Other PHARMACY TO DOSE Korey Ruiz M.D.        lidocaine (XYLOCAINE) 1 % injection 2 mL  2 mL Tracheal Tube Q30 MIN PRN Korey Ruiz M.D.        norepinephrine (Levophed) 8 mg in 250 mL NS infusion (premix)  0-1 mcg/kg/min (Ideal) Intravenous Continuous Korey Ruiz M.D.   Stopped at 12/08/22 0020    vasopressin (Vasostrict) 20 Units in  mL Infusion  0.03 Units/min Intravenous Continuous Korey Ruiz M.D.   Dose not Required at 12/07/22 2230    dexmedetomidine (PRECEDEX) 400 mcg/100mL NS premix infusion  0.1-1.5 mcg/kg/hr (Ideal) Intravenous Continuous Korey Ruiz M.D. 18.3 mL/hr at 12/08/22 0753 1 mcg/kg/hr at 12/08/22 0753    levETIRAcetam (KEPPRA) tablet 500 mg  500 mg Oral BID Yossi Wliliamson M.D.   500 mg at 12/07/22 0502    dexamethasone (DECADRON) injection 4 mg  4 mg Intravenous Q6HRS Yossi Williamson M.D.   4 mg at 12/08/22 0518    losartan (COZAAR) tablet 50 mg  50 mg Oral BID Yossi Williamson M.D.   50 mg at 12/07/22 0502    Respiratory Therapy Consult   Nebulization Continuous RT Yossi Williamson M.D.        ondansetron (ZOFRAN ODT) dispertab 4 mg  4 mg Oral Q4HRS PRN Yossi Williamson M.D.        promethazine (PHENERGAN) tablet 12.5-25 mg   12.5-25 mg Oral Q4HRS PRN Yossi Williamson M.D.        promethazine (PHENERGAN) suppository 12.5-25 mg  12.5-25 mg Rectal Q4HRS PRN Yossi Williamson M.D.        prochlorperazine (COMPAZINE) injection 5-10 mg  5-10 mg Intravenous Q4HRS PRN Yossi Williamson M.D.           Fluids    Intake/Output Summary (Last 24 hours) at 12/8/2022 0757  Last data filed at 12/8/2022 0600  Gross per 24 hour   Intake 12215.46 ml   Output 4780 ml   Net 5238.46 ml       Laboratory  Recent Labs     12/07/22  1553 12/07/22  1934 12/08/22  0436   ISTATAPH 7.281* 7.415 7.352*   ISTATAPCO2 51.6* 34.7 31.6   ISTATAPO2 396* 400* 86   ISTATATCO2 26 23 19*   OWWHELX6TCA 100* 100* 96   ISTATARTHCO3 24.3 22.3 17.6   ISTATARTBE -3 -2 -7*   ISTATTEMP see below 96.7 F 102.4 F   ISTATFIO2  --  100 40   ISTATSPEC Arterial Arterial Arterial   ISTATAPHTC  --  7.430 7.322*   JBOREHLK9CO  --  394* 99*         Recent Labs     12/07/22  0519 12/08/22  0020 12/08/22  0333   SODIUM 133* 135 134*   POTASSIUM 4.4 4.9 5.5   CHLORIDE 101 104 107   CO2 21 17* 19*   BUN 22 24* 23*   CREATININE 0.57 0.87 0.81   MAGNESIUM 2.1 1.9 2.0   PHOSPHORUS 3.6 4.9* 4.4   CALCIUM 8.8 8.3* 8.0*     Recent Labs     12/06/22  0450 12/07/22  0519 12/08/22  0020 12/08/22  0333   ALTSGPT 12 12  --  16   ASTSGOT 8* 12  --  16   ALKPHOSPHAT 84 81  --  67   TBILIRUBIN 1.0 0.9  --  1.1   GLUCOSE 166* 160* 263* 259*     Recent Labs     12/06/22  0450 12/07/22  0519 12/08/22  0020 12/08/22  0333   WBC 11.1* 9.6 32.9* 41.8*   ASTSGOT 8* 12  --  16   ALTSGPT 12 12  --  16   ALKPHOSPHAT 84 81  --  67   TBILIRUBIN 1.0 0.9  --  1.1     Recent Labs     12/06/22  1530 12/07/22  0519 12/08/22  0020 12/08/22  0333   RBC  --  4.80 4.55* 4.33*   HEMOGLOBIN  --  14.7 13.8* 13.4*   HEMATOCRIT  --  42.5 39.7* 38.3*   PLATELETCT  --  145* 134* 113*   PROTHROMBTM 13.3  --   --   --    APTT 26.2  --   --   --    INR 1.02  --   --   --        Imaging  X-Ray:  I have personally reviewed the  images and compared with prior images.  CT:    Reviewed    Assessment/Plan  * Brain mass- (present on admission)  Assessment & Plan  Patient presented to outside facility with headache x2 months  No family history  MRI brain here showed 6.7 x 4.7 x 5.9 cm size enhancing extra-axial mass noted bilateral mid posterior parietal region with 6 mm midline shift  Neurosurgery consulted and started on Keppra and steroids.    Surgical resection 12/7  IR embolization 12/6    SIRS (systemic inflammatory response syndrome) (HCC)- (present on admission)  Assessment & Plan  SIRS criteria identified on my evaluation include:  Fever, with temperature greater than 101 deg F, Tachycardia, with heart rate greater than 90 BPM, Tachypnea, with respirations greater than 20 per minute and Leukocytosis, with WBC greater than 12,000  Without clear source  Continue Abx  F/U cultures  Trend lactate  IVF PRN and vasopressors    Lactic acidosis  Assessment & Plan  Uncertain etiology  Post-operative  Euvolemic on exam  Continue to trend and give IVF boluses PRN  Check CPK  Optimize hemodynamics    On mechanically assisted ventilation (HCC)- (present on admission)  Assessment & Plan  Intubated 12/7 for OR  RT/O2 protocols  Daily and PRN ABGs  Titration of ventilator therapy based on ABGs and patient's status  Sedation as tolerated/indicated  Daily CXR  HOB >30 degrees and peridex for VAP prevention  Pepcid for GI prophylaxis  SAT/SBT when able (ABCDEF Bundle)  Early mobility    Trial Vent liberation today    Hyponatremia- (present on admission)  Assessment & Plan  Stable  Mild  Follow BMP    Hyperglycemia- (present on admission)  Assessment & Plan  On steroids, no known history of DM2  A1c 6.4  Monitor  Goal 140-180    Hypertension- (present on admission)  Assessment & Plan  On losartan as outpatient  As needed IV antihypertensives with parameters <160 mmHg       VTE:  Contraindicated  Ulcer: H2 Antagonist  Lines: Central Line  Ongoing indication  addressed, Arterial Line  Ongoing indication addressed, and Tsai Catheter  Ongoing indication addressed    I have performed a physical exam and reviewed and updated ROS and Plan today (12/8/2022). In review of yesterday's note (12/7/2022), there are no changes except as documented above.     Discussed patient condition and risk of morbidity and/or mortality with RN, RT, Pharmacy, Code status disscussed, Charge nurse / hot rounds, Patient, and neurosurgery    The patient remains critically ill.  Critical care time = 54 minutes in directly providing and coordinating critical care and extensive data review.  No time overlap and excludes procedures.

## 2022-12-08 NOTE — CARE PLAN
The patient is Unstable - High likelihood or risk of patient condition declining or worsening    Shift Goals  Clinical Goals: Stable hemodynamics  Patient Goals: NURA  Family Goals: Updates    Progress made toward(s) clinical / shift goals:  Pt back from surgery at 1900, presents hemodynamically unstable with hypotension, tachycardia, and febrile. However with vasopressors and fentanyl gtt, patient was able to stabilize. MD and APRN assisted RN with hemodynamic instability, made necessary changes to current medication. Ice packs and cooling blanket, and tylenol resolved fever. Family members updated. Neurosurgery updated.     Problem: Knowledge Deficit - Standard  Goal: Patient and family/care givers will demonstrate understanding of plan of care, disease process/condition, diagnostic tests and medications  Outcome: Progressing     Problem: Pain - Standard  Goal: Alleviation of pain or a reduction in pain to the patient’s comfort goal  Outcome: Progressing     Problem: Fall Risk  Goal: Patient will remain free from falls  Outcome: Progressing     Problem: Neuro Status  Goal: Neuro status will remain stable or improve  Outcome: Progressing       Patient is not progressing towards the following goals:      Problem: Bowel Elimination  Goal: Establish and maintain regular bowel function  Outcome: Not Progressing

## 2022-12-08 NOTE — PROGRESS NOTES
Pharmacy Vancomycin Kinetics Note for 12/8/2022     57 y.o. male on Vancomycin day # 1     Vancomycin Indication (Two level/Trough based Dosing): CNS Infection (goal trough 18-22)    Provider specified end date: 12/13/22    Active Antibiotics (From admission, onward)      Ordered     Ordering Provider       Thu Dec 8, 2022  6:05 AM    12/08/22 0605  cefTRIAXone (Rocephin) syringe 2,000 mg  EVERY 12 HOURS         BRANDON BarajasRByronNByorn    12/08/22 0605  MD Alert...Vancomycin per Pharmacy  PHARMACY TO DOSE        Question:  Indication(s) for vancomycin?  Answer:  Central nervous system infection    BRANDON BarajasRByronN.    12/08/22 06Alana  vancomycin (VANCOCIN) 2,250 mg in  mL IVPB  (vancomycin (VANCOCIN) IV (LD + Maintenance))  ONCE         SAWYER Barajas            Dosing Weight: 88.3 kg (194 lb 10.7 oz)      Admission History: Admitted on 11/30/2022 for Brain mass [G93.89]  Pertinent history: Pt is s/p IR embolization and crani, resection 12/7, on cefazolin post-op, now with new onset hypotension, fever, and leukocytosis. Broadening antimicrobial coverage for adequate CNS penetration    Allergies:     Patient has no known allergies.     Pertinent cultures to date:     Results       Procedure Component Value Units Date/Time    URINALYSIS [[id number]]  (Abnormal) Collected: 12/08/22 0431    Order Status: Completed Specimen: Urine, Tsai Cath Updated: 12/08/22 3959     Color DK Yellow     Character Clear     Specific Gravity 1.026     Ph 5.0     Glucose Negative mg/dL      Ketones Negative mg/dL      Protein Negative mg/dL      Bilirubin Negative     Urobilinogen, Urine 1.0     Nitrite Negative     Leukocyte Esterase Negative     Occult Blood Small     Micro Urine Req Microscopic     Comment: Corrected result; previously reported as Microscopic on 12/08/22 at 04:46       Narrative:      Collected By: [id number] SILVIA ROGERS    BLOOD CULTURE [[id number]] Collected: 12/08/22 0358    Order Status:  "Sent Specimen: Blood from Peripheral Updated: 22    Narrative:      Per Hospital Policy: Only change Specimen Src: to \"Line\" if  specified by physician order.    BLOOD CULTURE [[id number]] Collected: 22    Order Status: Sent Specimen: Blood from Peripheral Updated: 22    Narrative:      Per Hospital Policy: Only change Specimen Src: to \"Line\" if  specified by physician order.            Labs:     Estimated Creatinine Clearance: 112.6 mL/min (by C-G formula based on SCr of 0.81 mg/dL).  Recent Labs     22  0519 22  0020 22  0333   WBC 11.1* 9.6 32.9* 41.8*     Recent Labs     22  0519 22  0020 22  0333   BUN 24* 22 24* 23*   CREATININE 0.63 0.57 0.87 0.81   ALBUMIN 3.5 3.5  --  2.8*       Intake/Output Summary (Last 24 hours) at 2022 0652  Last data filed at 2022 0400  Gross per 24 hour   Intake 9501.99 ml   Output 4630 ml   Net 4871.99 ml      /86   Pulse 72   Temp 36.2 °C (97.1 °F) (Temporal)   Resp (!) 9   Ht 1.778 m (5' 10\")   Wt 88.3 kg (194 lb 10.7 oz)   SpO2 97%  Temp (24hrs), Av.5 °C (97.7 °F), Min:36.2 °C (97.1 °F), Max:36.7 °C (98.1 °F)      List concerns for Vancomycin clearance:     BUN/Scr ratio greater than 20:1;Malnutrition/Low albumin;Pressors/Hypotension    A/P:     -  Vancomycin dose: 1,750 mg (20 mg/kg) Q12H (07:00 ; 19:00)    -  Next vancomycin level(s):    - 4th-5th dose or sooner if renal function changes    -  Comments: Moderate concern for vancomycin accumulation. MRSA PCR ordered to guide therapy. Pharmacy will monitor and adjust dosing as appropriate.     Willard Collins, PharmD    "

## 2022-12-08 NOTE — PROGRESS NOTES
1953 - Notified MD for HOTN, SBP 60-70s, MAP 50s. Propofol paused. Patient awake and restless. Following commands. Received orders for Levophed - see MAR. Plan to insert CVC. Family at bedside agrees to plan with APRN.

## 2022-12-08 NOTE — CARE PLAN
Pt extubated and on room air. No stridor present at this time. Bilateral breath sounds auscultated. Pt is able to cough and talk.    Problem: Ventilation  Goal: Ability to achieve and maintain unassisted ventilation or tolerate decreased levels of ventilator support  Description: Target End Date:  4 days     Document on Vent flowsheet    1.  Support and monitor invasive and noninvasive mechanical ventilation  2.  Monitor ventilator weaning response  3.  Perform ventilator associated pneumonia prevention interventions  4.  Manage ventilation therapy by monitoring diagnostic test results  Outcome: Met

## 2022-12-08 NOTE — PROGRESS NOTES
Pt arrived to unit on monitor and in bed with OR team at 1845hrs. VSS on arrival. Propofol infusing for sedation. Family aware that pt is back in ICU. Per Dr. Donis, plan is to keep pt intubated overnight and reassess in the AM after imaging.

## 2022-12-08 NOTE — PROGRESS NOTES
Writer notified Dr. Sidhu of pupillary change and nystagmus at approximately 1430. No other neurological changes noted at this time. See flow sheet for data.

## 2022-12-08 NOTE — ASSESSMENT & PLAN NOTE
Uncertain etiology  Post-operative  Euvolemic on exam  Continue to trend and give IVF boluses PRN  Check CPK  Optimize hemodynamics

## 2022-12-08 NOTE — PROGRESS NOTES
0002: Dr Gerard at bedside.  . /79. SPO2 99  0003: 6mg adenosine  0004:  BP 90/62  0004:  /65  New orders from Dr Gerard  40mg phenylephrine mixed in 250mL 2Rn verify by Waylon Flanagan RN.

## 2022-12-09 ENCOUNTER — APPOINTMENT (OUTPATIENT)
Dept: RADIOLOGY | Facility: MEDICAL CENTER | Age: 57
DRG: 025 | End: 2022-12-09
Attending: STUDENT IN AN ORGANIZED HEALTH CARE EDUCATION/TRAINING PROGRAM
Payer: COMMERCIAL

## 2022-12-09 LAB
ALBUMIN SERPL BCP-MCNC: 2.4 G/DL (ref 3.2–4.9)
ALBUMIN/GLOB SERPL: 1.6 G/DL
ALP SERPL-CCNC: 51 U/L (ref 30–99)
ALT SERPL-CCNC: 16 U/L (ref 2–50)
ANION GAP SERPL CALC-SCNC: 6 MMOL/L (ref 7–16)
AST SERPL-CCNC: 13 U/L (ref 12–45)
BILIRUB SERPL-MCNC: 0.4 MG/DL (ref 0.1–1.5)
BUN SERPL-MCNC: 15 MG/DL (ref 8–22)
CALCIUM SERPL-MCNC: 8.2 MG/DL (ref 8.5–10.5)
CHLORIDE SERPL-SCNC: 107 MMOL/L (ref 96–112)
CO2 SERPL-SCNC: 24 MMOL/L (ref 20–33)
CREAT SERPL-MCNC: 0.56 MG/DL (ref 0.5–1.4)
ERYTHROCYTE [DISTWIDTH] IN BLOOD BY AUTOMATED COUNT: 46.7 FL (ref 35.9–50)
GFR SERPLBLD CREATININE-BSD FMLA CKD-EPI: 115 ML/MIN/1.73 M 2
GLOBULIN SER CALC-MCNC: 1.5 G/DL (ref 1.9–3.5)
GLUCOSE BLD STRIP.AUTO-MCNC: 155 MG/DL (ref 65–99)
GLUCOSE BLD STRIP.AUTO-MCNC: 172 MG/DL (ref 65–99)
GLUCOSE BLD STRIP.AUTO-MCNC: 192 MG/DL (ref 65–99)
GLUCOSE SERPL-MCNC: 174 MG/DL (ref 65–99)
HCT VFR BLD AUTO: 27 % (ref 42–52)
HGB BLD-MCNC: 9.5 G/DL (ref 14–18)
LACTATE SERPL-SCNC: 2.7 MMOL/L (ref 0.5–2)
MAGNESIUM SERPL-MCNC: 2 MG/DL (ref 1.5–2.5)
MCH RBC QN AUTO: 30.5 PG (ref 27–33)
MCHC RBC AUTO-ENTMCNC: 35.2 G/DL (ref 33.7–35.3)
MCV RBC AUTO: 86.8 FL (ref 81.4–97.8)
PHOSPHATE SERPL-MCNC: 2.5 MG/DL (ref 2.5–4.5)
PLATELET # BLD AUTO: 67 K/UL (ref 164–446)
PMV BLD AUTO: 11.2 FL (ref 9–12.9)
POTASSIUM SERPL-SCNC: 4.1 MMOL/L (ref 3.6–5.5)
PROT SERPL-MCNC: 3.9 G/DL (ref 6–8.2)
RBC # BLD AUTO: 3.11 M/UL (ref 4.7–6.1)
SODIUM SERPL-SCNC: 137 MMOL/L (ref 135–145)
WBC # BLD AUTO: 18.5 K/UL (ref 4.8–10.8)

## 2022-12-09 PROCEDURE — 83605 ASSAY OF LACTIC ACID: CPT

## 2022-12-09 PROCEDURE — 71045 X-RAY EXAM CHEST 1 VIEW: CPT

## 2022-12-09 PROCEDURE — A9270 NON-COVERED ITEM OR SERVICE: HCPCS | Performed by: PHYSICIAN ASSISTANT

## 2022-12-09 PROCEDURE — A9270 NON-COVERED ITEM OR SERVICE: HCPCS | Performed by: NURSE PRACTITIONER

## 2022-12-09 PROCEDURE — 92523 SPEECH SOUND LANG COMPREHEN: CPT

## 2022-12-09 PROCEDURE — 700111 HCHG RX REV CODE 636 W/ 250 OVERRIDE (IP): Performed by: HOSPITALIST

## 2022-12-09 PROCEDURE — 700111 HCHG RX REV CODE 636 W/ 250 OVERRIDE (IP): Performed by: INTERNAL MEDICINE

## 2022-12-09 PROCEDURE — A9270 NON-COVERED ITEM OR SERVICE: HCPCS | Performed by: STUDENT IN AN ORGANIZED HEALTH CARE EDUCATION/TRAINING PROGRAM

## 2022-12-09 PROCEDURE — 83735 ASSAY OF MAGNESIUM: CPT

## 2022-12-09 PROCEDURE — 84100 ASSAY OF PHOSPHORUS: CPT

## 2022-12-09 PROCEDURE — 80053 COMPREHEN METABOLIC PANEL: CPT

## 2022-12-09 PROCEDURE — 700102 HCHG RX REV CODE 250 W/ 637 OVERRIDE(OP): Performed by: NURSE PRACTITIONER

## 2022-12-09 PROCEDURE — 82962 GLUCOSE BLOOD TEST: CPT | Mod: 91

## 2022-12-09 PROCEDURE — 97163 PT EVAL HIGH COMPLEX 45 MIN: CPT

## 2022-12-09 PROCEDURE — 770020 HCHG ROOM/CARE - TELE (206)

## 2022-12-09 PROCEDURE — 85027 COMPLETE CBC AUTOMATED: CPT

## 2022-12-09 PROCEDURE — 700105 HCHG RX REV CODE 258: Performed by: INTERNAL MEDICINE

## 2022-12-09 PROCEDURE — 97167 OT EVAL HIGH COMPLEX 60 MIN: CPT

## 2022-12-09 PROCEDURE — 700102 HCHG RX REV CODE 250 W/ 637 OVERRIDE(OP): Performed by: PHYSICIAN ASSISTANT

## 2022-12-09 PROCEDURE — 700111 HCHG RX REV CODE 636 W/ 250 OVERRIDE (IP): Performed by: NURSE PRACTITIONER

## 2022-12-09 PROCEDURE — 700102 HCHG RX REV CODE 250 W/ 637 OVERRIDE(OP): Performed by: STUDENT IN AN ORGANIZED HEALTH CARE EDUCATION/TRAINING PROGRAM

## 2022-12-09 PROCEDURE — 700102 HCHG RX REV CODE 250 W/ 637 OVERRIDE(OP): Performed by: INTERNAL MEDICINE

## 2022-12-09 PROCEDURE — A9270 NON-COVERED ITEM OR SERVICE: HCPCS | Performed by: INTERNAL MEDICINE

## 2022-12-09 PROCEDURE — 700105 HCHG RX REV CODE 258: Performed by: NURSE PRACTITIONER

## 2022-12-09 RX ORDER — LEVETIRACETAM 500 MG/1
500 TABLET ORAL 2 TIMES DAILY
Status: DISCONTINUED | OUTPATIENT
Start: 2022-12-09 | End: 2022-12-15 | Stop reason: HOSPADM

## 2022-12-09 RX ADMIN — INSULIN HUMAN 2 UNITS: 100 INJECTION, SOLUTION PARENTERAL at 16:50

## 2022-12-09 RX ADMIN — DOCUSATE SODIUM 100 MG: 100 CAPSULE, LIQUID FILLED ORAL at 05:53

## 2022-12-09 RX ADMIN — ACETAMINOPHEN 1000 MG: 500 TABLET ORAL at 17:20

## 2022-12-09 RX ADMIN — DEXAMETHASONE SODIUM PHOSPHATE 4 MG: 4 INJECTION, SOLUTION INTRA-ARTICULAR; INTRALESIONAL; INTRAMUSCULAR; INTRAVENOUS; SOFT TISSUE at 05:54

## 2022-12-09 RX ADMIN — INSULIN HUMAN 2 UNITS: 100 INJECTION, SOLUTION PARENTERAL at 12:18

## 2022-12-09 RX ADMIN — CEFTRIAXONE SODIUM 2000 MG: 10 INJECTION, POWDER, FOR SOLUTION INTRAVENOUS at 16:49

## 2022-12-09 RX ADMIN — ACETAMINOPHEN 1000 MG: 500 TABLET ORAL at 12:12

## 2022-12-09 RX ADMIN — DOCUSATE SODIUM 100 MG: 100 CAPSULE, LIQUID FILLED ORAL at 16:49

## 2022-12-09 RX ADMIN — LEVETIRACETAM 500 MG: 500 TABLET, FILM COATED ORAL at 16:58

## 2022-12-09 RX ADMIN — DEXAMETHASONE SODIUM PHOSPHATE 4 MG: 4 INJECTION, SOLUTION INTRA-ARTICULAR; INTRALESIONAL; INTRAMUSCULAR; INTRAVENOUS; SOFT TISSUE at 16:49

## 2022-12-09 RX ADMIN — ACETAMINOPHEN 1000 MG: 500 TABLET ORAL at 05:53

## 2022-12-09 RX ADMIN — FAMOTIDINE 20 MG: 20 TABLET, FILM COATED ORAL at 05:53

## 2022-12-09 RX ADMIN — INSULIN HUMAN 2 UNITS: 100 INJECTION, SOLUTION PARENTERAL at 21:38

## 2022-12-09 RX ADMIN — DOCUSATE SODIUM 50 MG AND SENNOSIDES 8.6 MG 1 TABLET: 8.6; 5 TABLET, FILM COATED ORAL at 21:32

## 2022-12-09 RX ADMIN — MINERAL OIL, PETROLATUM 1 APPLICATION: 425; 573 OINTMENT OPHTHALMIC at 05:54

## 2022-12-09 RX ADMIN — PHENYLEPHRINE HYDROCHLORIDE 1 MCG/KG/MIN: 10 INJECTION INTRAVENOUS at 06:59

## 2022-12-09 RX ADMIN — POLYETHYLENE GLYCOL 3350 1 PACKET: 17 POWDER, FOR SOLUTION ORAL at 21:32

## 2022-12-09 RX ADMIN — CEFTRIAXONE SODIUM 2000 MG: 10 INJECTION, POWDER, FOR SOLUTION INTRAVENOUS at 06:27

## 2022-12-09 RX ADMIN — LEVETIRACETAM 500 MG: 100 INJECTION, SOLUTION INTRAVENOUS at 05:53

## 2022-12-09 RX ADMIN — DEXAMETHASONE SODIUM PHOSPHATE 4 MG: 4 INJECTION, SOLUTION INTRA-ARTICULAR; INTRALESIONAL; INTRAMUSCULAR; INTRAVENOUS; SOFT TISSUE at 12:12

## 2022-12-09 RX ADMIN — VANCOMYCIN HYDROCHLORIDE 1750 MG: 500 INJECTION, POWDER, LYOPHILIZED, FOR SOLUTION INTRAVENOUS at 06:27

## 2022-12-09 ASSESSMENT — PATIENT HEALTH QUESTIONNAIRE - PHQ9
2. FEELING DOWN, DEPRESSED, IRRITABLE, OR HOPELESS: NOT AT ALL
1. LITTLE INTEREST OR PLEASURE IN DOING THINGS: NOT AT ALL
SUM OF ALL RESPONSES TO PHQ9 QUESTIONS 1 AND 2: 0

## 2022-12-09 ASSESSMENT — COGNITIVE AND FUNCTIONAL STATUS - GENERAL
SUGGESTED CMS G CODE MODIFIER DAILY ACTIVITY: CK
WALKING IN HOSPITAL ROOM: A LITTLE
STANDING UP FROM CHAIR USING ARMS: A LITTLE
SUGGESTED CMS G CODE MODIFIER MOBILITY: CK
DRESSING REGULAR UPPER BODY CLOTHING: A LITTLE
MOBILITY SCORE: 17
MOVING FROM LYING ON BACK TO SITTING ON SIDE OF FLAT BED: A LITTLE
CLIMB 3 TO 5 STEPS WITH RAILING: A LITTLE
HELP NEEDED FOR BATHING: A LOT
DAILY ACTIVITIY SCORE: 17
DRESSING REGULAR LOWER BODY CLOTHING: A LOT
TURNING FROM BACK TO SIDE WHILE IN FLAT BAD: A LITTLE
TOILETING: A LITTLE
PERSONAL GROOMING: A LITTLE
MOVING TO AND FROM BED TO CHAIR: A LOT

## 2022-12-09 ASSESSMENT — PAIN DESCRIPTION - PAIN TYPE
TYPE: ACUTE PAIN
TYPE: ACUTE PAIN

## 2022-12-09 ASSESSMENT — FIBROSIS 4 INDEX: FIB4 SCORE: 2.02

## 2022-12-09 ASSESSMENT — GAIT ASSESSMENTS: GAIT LEVEL OF ASSIST: UNABLE TO PARTICIPATE

## 2022-12-09 ASSESSMENT — ACTIVITIES OF DAILY LIVING (ADL): TOILETING: INDEPENDENT

## 2022-12-09 NOTE — PROGRESS NOTES
Pt transferred to S180 via wheelchair with all personal belongings including one large bag and another bag of clothing.  Pt's niece Andrew notified of room transfer.

## 2022-12-09 NOTE — THERAPY
Occupational Therapy   Initial Evaluation     Patient Name: Ambrosio Hardy  Age:  57 y.o., Sex:  male  Medical Record #: [id number]  Today's Date: 12/9/2022     Precautions  Precautions: Fall Risk  Comments: visual impairment post-op, -160 goal    Assessment  Patient is 57 y.o. male s/p B crani for mass resection.  Additional factors influencing patient status / progress: weakness, fatigue, impaired balance, impaired vision, impaired cognition.    New impaired vision, see below      Plan    Recommend Occupational Therapy 4 times per week until therapy goals are met for the following treatments:  Adaptive Equipment, Cognitive Skill Development, Neuro Re-Education / Balance, Self Care/Activities of Daily Living, Therapeutic Activities, and Therapeutic Exercises.    DC Equipment Recommendations: Unable to determine at this time  Discharge Recommendations: Recommend post-acute placement for additional occupational therapy services prior to discharge home     Objective       12/09/22 1043   Prior Living Situation   Prior Services None;Home-Independent   Housing / Facility 1 Story House   Bathroom Set up Walk In Shower   Equipment Owned None   Lives with - Patient's Self Care Capacity   (bro and sis)   Comments nobody works in the home. can help if needed.   Prior Level of ADL Function   Self Feeding Independent   Grooming / Hygiene Independent   Bathing Independent   Dressing Independent   Toileting Independent   Prior Level of IADL Function   Medication Management Independent   Laundry Independent   Kitchen Mobility Independent   Finances Independent   Home Management Independent   Shopping Independent   Prior Level Of Mobility Independent Without Device in Community;Independent Without Device in Home   Driving / Transportation Driving Independent   Occupation (Pre-Hospital Vocational) Retired Due To Age   Precautions   Precautions Fall Risk   Comments visual impairment post op, -160   Pain 0 - 10  Group   Therapist Pain Assessment Nurse Notified;0   Cognition    Cognition / Consciousness X   Speech/ Communication Delayed Responses   Level of Consciousness Alert   Ability To Follow Commands 1 Step   Sequencing Impaired   Initiation Impaired   Comments pleasant, odd affect at times. appears to have difficulty processing directions.   Active ROM Upper Body   Active ROM Upper Body  WDL   Strength Upper Body   Upper Body Strength  WDL   Sensation Upper Body   Upper Extremity Sensation  WDL   Comments with formal testing, however L w/ impaired sensation trying to find modified call button (tele lead on nurse call button)   Balance Assessment   Sitting Balance (Static) Fair +   Sitting Balance (Dynamic) Fair   Standing Balance (Static) Fair   Standing Balance (Dynamic) Fair -   Weight Shift Sitting Fair   Weight Shift Standing Fair   Comments no AD   Bed Mobility    Supine to Sit Minimal Assist   Sit to Supine Supervised   Comments dificulty navigating OOB due to impaired vision   ADL Assessment   Grooming Supervision;Seated   Lower Body Dressing Minimal Assist   Toileting   (trotter)   Comments further OOB ADLs limited by orthostatic hypotension and increased HR   How much help from another person does the patient currently need...   Putting on and taking off regular lower body clothing? 2   Bathing (including washing, rinsing, and drying)? 2   Toileting, which includes using a toilet, bedpan, or urinal? 3   Putting on and taking off regular upper body clothing? 3   Taking care of personal grooming such as brushing teeth? 3   Eating meals? 4   6 Clicks Daily Activity Score 17   Functional Mobility   Sit to Stand Contact Guard Assist   Mobility EOB>STS>BTB   Comments w/ HHA   Visual Perception   Visual Perception  X   Comments New impaired vision. Can see minimally, reports mostly light. Can see directly in front intermittently, able to tell when therapist has glasses on/off. Able to tell most colors.   Activity  Tolerance   Comments EOB HR in 120s /78,, standing HR up to 160 and 96/66   Patient / Family Goals   Patient / Family Goal #1 go home   Short Term Goals   Short Term Goal # 1 pt will demo toilet txf with supv   Short Term Goal # 2 pt will navigate to BR w/ min cueing and supv   Short Term Goal # 3 pt will dress LB with supv   Short Term Goal # 4 pt will groom at the sink with supv

## 2022-12-09 NOTE — PROGRESS NOTES
"Released \"Signed and Held\" orders from Recovery phase of admission per Charge RN. Via epic charting reminder and comparison to current ICU level orders notified APRN to ensure appropriate orders in place.   "

## 2022-12-09 NOTE — CARE PLAN
The patient is Watcher - Medium risk of patient condition declining or worsening    Shift Goals  Clinical Goals: stable hemodynamics, pain control  Patient Goals: comfort, rest  Family Goals: comfort    Progress made toward(s) clinical / shift goals:  Continued hemodynamic monitoring in use, vasopressor therapy in use at this time, patient educated on pain assessment scale, pain assessed and appropriate interventions to alleviate pain performed, pain re-assessed       Problem: Knowledge Deficit - Standard  Goal: Patient and family/care givers will demonstrate understanding of plan of care, disease process/condition, diagnostic tests and medications  Outcome: Progressing   Patient and patient family members verbally demonstrate understanding of plan of care and current condition. Patient and patient family members verbally demonstrate understanding of planned follow up imaging, current medication and need of continued ICU level monitoring.     Problem: Pain - Standard  Goal: Alleviation of pain or a reduction in pain to the patient’s comfort goal  Outcome: Progressing  Educated on pain rating scale, pharmacological and non-pharmacological pain relief and both assessed and re-assessed pain as indicated.      Problem: Fall Risk  Goal: Patient will remain free from falls  Outcome: Progressing   Appropriate fall risk assessments perform. Educated patient on current fall risk level at this time. Educated patient on use of call light and to call for assistance with mobility. Appropriate fall risk signs in place.     Problem: Neuro Status  Goal: Neuro status will remain stable or improve  Outcome: Progressing  Serial neuro assessments performed as ordered and as needed. Patient's neuro status remains stable at this time.      Problem: Urinary Elimination  Goal: Establish and maintain regular urinary output  Outcome: Progressing  Regular urinary output via trotter catheter achieved at this time.      Patient is not progressing  towards the following goals:    Problem: Bowel Elimination  Goal: Establish and maintain regular bowel function  Outcome: Not Progressing   Bowel protocol has increased. Patient has not had bowel elimination at this time. Patient educated on bowel protocol.

## 2022-12-09 NOTE — PROGRESS NOTES
Pt has been tachycardic 110s-120s since stopping pete gtt this morning.  PT/OT in to work with pt, when pt stood HR up to 159 and BP 96/66.  HR decreased to the 130s and /78 while sitting at edge of bed. Pt denying dizziness, light headedness. Dr. Bernal notified of increased HR with mobilization as pt has an assigned bed on T3, MD will change orders to neuro with tele.

## 2022-12-09 NOTE — PROGRESS NOTES
"Left arterial line not correlating, waveform dampened then not reading. Reassessed line status. Re-zeroed, performed square test, assessed perfusion and asked patient if they had any pain. Patient said \"No\" to pain. Re-attempted arm board for arterial line. Blood not drawing back from arterial line. Notified APRN. APRN to bedside to assess arterial line and attempted to troubleshoot above issues. Per APRN to remove and okay to use manual blood pressure to monitor hemodynamic status and titrate vasopressor therapy as needed. See orders.    "

## 2022-12-09 NOTE — PROGRESS NOTES
Neurosurgery Progress Note    Subjective:  Extubated yesterday unable to see minimal light perception post op, possible 2/2 occipital stroke vs possible brain shift. 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)   HVC >100    BP  Min: 71/47  Max: 152/80  Pulse  Av.8  Min: 43  Max: 82  Resp  Avg: 15.6  Min: 7  Max: 42  Monitored Temp 2  Av.3 °C (97.4 °F)  Min: 33.2 °C (91.8 °F)  Max: 37.2 °C (99 °F)  SpO2  Av %  Min: 88 %  Max: 100 %    No data recorded    Recent Labs     22  0020 22  0333 22  0605   WBC 32.9* 41.8* 18.5*   RBC 4.55* 4.33* 3.11*   HEMOGLOBIN 13.8* 13.4* 9.5*   HEMATOCRIT 39.7* 38.3* 27.0*   MCV 87.3 88.5 86.8   MCH 30.3 30.9 30.5   MCHC 34.8 35.0 35.2   RDW 46.5 46.9 46.7   PLATELETCT 134* 113* 67*   MPV 10.2 10.1 11.2       Recent Labs     22  0020 22  0333 22  0605   SODIUM 135 134* 137   POTASSIUM 4.9 5.5 4.1   CHLORIDE 104 107 107   CO2 17* 19* 24   GLUCOSE 263* 259* 174*   BUN 24* 23* 15   CREATININE 0.87 0.81 0.56   CALCIUM 8.3* 8.0* 8.2*       Recent Labs     22  1530   APTT 26.2   INR 1.02             Intake/Output                         22 0700 - 22 0659 22 0700 - 12/10/22 0659     9469-5648 9938-4023 Total 1685-4089 8006-1666 Total                 Intake    P.O.  840  -- 840  --  -- --    P.O. 840 -- 840 -- -- --    I.V.  2084.7  385.5 2470.3  67.5  -- 67.5    Fentanyl Volume 5 -- 5 -- -- --    Precedex Volume 48.6 -- 48.6 -- -- --    Phenylephrine Volume 765.8 385.5 1151.3 67.5 -- 67.5    Norepinephrine Volume 0 -- 0 -- -- --    Volume (mL) (dextrose 5 % and 0.9 % NaCl with KCl 20 mEq infusion) 277.9  -- 277.9 -- -- --    Volume (mL) (lactated ringer BOLUS infusion) 987.5 -- 987.5 -- -- --    Other  240  120 360  --  -- --    Medications (PO/Enteral Liquids) 240 120 360 -- -- --    IV Piggyback  498.1  -- 498.1  634.1  -- 634.1    Volume (mL) (ceFAZolin (Ancef) 2 g in  mL IVPB) 0 -- 0 -- -- --    Volume (mL) (ampicillin/sulbactam (UNASYN) 3 g in  mL IVPB) 0 -- 0 -- -- --    Volume (mL) (vancomycin (VANCOCIN) 2,250 mg in  mL IVPB) 498.1 -- 498.1 -- -- --    Volume (mL) (vancomycin (VANCOCIN) 1,750 mg in  mL IVPB) 0 -- 0 634.1 -- 634.1    Total Intake 3662.8 505.5 4168.3 701.6 -- 701.6       Output    Urine  1005  2125 3130  450  -- 450    Output (mL) (Urethral Catheter Non-latex;Temperature probe 16 Fr.) 1005 2125 3130 450 -- 450    Drains  80  -- 80  75  -- 75    Output (mL) ([REMOVED] Closed/Suction Drain Inferior Scalp Hemovac) 80 -- 80 75 -- 75    Stool  --  -- --  --  -- --    Number of Times Stooled 0 x 0 x 0 x 0 x -- 0 x    Total Output 1085 2125 3210 525 -- 525       Net I/O     2577.8 -1619.5 958.3 176.6 -- 176.6              Intake/Output Summary (Last 24 hours) at 12/9/2022 0920  Last data filed at 12/9/2022 0831  Gross per 24 hour   Intake 4645.65 ml   Output 3580 ml   Net 1065.65 ml               phenylephrine infusion  0-5 mcg/kg/min (Ideal) Continuous    Pharmacy   PHARMACY TO DOSE    acetaminophen  1,000 mg Q6HRS    cefTRIAXone (ROCEPHIN) IV  2,000 mg Q12HRS    MD Alert...Vancomycin per Pharmacy   PHARMACY TO DOSE    vancomycin  20 mg/kg Q12HR    levETIRAcetam (Keppra) IV  500 mg Q12HRS    Pharmacy Consult Request  1 Each PHARMACY TO DOSE    MD ALERT...DO NOT ADMINISTER NSAIDS or ASPIRIN unless ORDERED By Neurosurgery  1 Each PRN    ondansetron  4 mg Q4HRS PRN    dexamethasone  4 mg Once PRN    diphenhydrAMINE  25 mg Q6HRS PRN    scopolamine  1 Patch Q72HRS PRN    docusate sodium  100 mg BID    senna-docusate  1 Tablet Nightly    senna-docusate  1 Tablet Q24HRS PRN     polyethylene glycol/lytes  1 Packet BID PRN    magnesium hydroxide  30 mL QDAY PRN    bisacodyl  10 mg Q24HRS PRN    sodium phosphate  1 Each Once PRN    artificial tears  1 Application Q8HRS    oxyCODONE immediate-release  5 mg Q3HRS PRN    Or    oxyCODONE immediate-release  10 mg Q3HRS PRN    Or    HYDROmorphone  0.5 mg Q3HRS PRN    diphenhydrAMINE  25 mg Q6HRS PRN    Or    diphenhydrAMINE  25 mg Q6HRS PRN    benzocaine-menthol  1 Lozenge Q2HRS PRN    famotidine  20 mg Q12HRS    Or    famotidine  20 mg Q12HRS    MD Alert...Adult ICU Electrolyte Replacement per Pharmacy   PHARMACY TO DOSE    NORepinephrine  0-1 mcg/kg/min (Ideal) Continuous    vasopressin (PITRESSIN) infusion  0.03 Units/min Continuous    dexamethasone  4 mg Q6HRS    Respiratory Therapy Consult   Continuous RT    ondansetron  4 mg Q4HRS PRN    promethazine  12.5-25 mg Q4HRS PRN    promethazine  12.5-25 mg Q4HRS PRN    prochlorperazine  5-10 mg Q4HRS PRN       Assessment and Plan:  Hospital day # 2  POD# 2  Chemical prophylactic DVT therapy: No  Start date/time: tbd    Ct head stable  Keppra 500 BID  Dex  MRI pending will review once done for stroke   Ok for Q4hr neuro checks and TTFAdarsh Donis

## 2022-12-09 NOTE — CARE PLAN
The patient is Watcher - Medium risk of patient condition declining or worsening    Shift Goals  Clinical Goals: Stable hemodynmics, extubation  Patient Goals: NURA  Family Goals: NURA    Progress made toward(s) clinical / shift goals:      Patient extubated to NC today and tolerating well. Restraints D/C'd and sedation D/C'd with extubation. Patient AAOx2-3, nystagumus noted, vision impaired bilaterally, and pupils 4mm round and sluggish. MAP goal achieved with titration of neosynephrine (see eMAR). Pain managed with scheduled tylenol. Mobilized patient to edge of bed with 2 person assist. Patient not tolerating mobilization due to fatigue and generalized weakness.Temperature managed and improved to 98.6 by 1730 with use of barehugger.     Problem: Pain - Standard  Goal: Alleviation of pain or a reduction in pain to the patient’s comfort goal  Outcome: Progressing     Problem: Respiratory  Goal: Patient will achieve/maintain optimum respiratory ventilation and gas exchange  Outcome: Progressing

## 2022-12-09 NOTE — CARE PLAN
The patient is Stable - Low risk of patient condition declining or worsening    Shift Goals  Clinical Goals: wean neosynephrine, mobilize, have bowel movement  Patient Goals: eat, mobilize  Family Goals: elizabeth    Progress made toward(s) clinical / shift goals:   - James gtt weaned off this morning. SBP has been in the low 110s since.  - PT/OT in to work with pt - sat at edge of bed, stood.    - Pt's last bowel movement was on 12/3, will continue to escalate bowel protocol.      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     Problem: Bowel Elimination  Goal: Establish and maintain regular bowel function  Outcome: Not Progressing  - No BM since 12/3, abdomen soft and rounded.  Will continue to escalate bowel protocol.

## 2022-12-10 ENCOUNTER — APPOINTMENT (OUTPATIENT)
Dept: CARDIOLOGY | Facility: MEDICAL CENTER | Age: 57
DRG: 025 | End: 2022-12-10
Attending: STUDENT IN AN ORGANIZED HEALTH CARE EDUCATION/TRAINING PROGRAM
Payer: COMMERCIAL

## 2022-12-10 ENCOUNTER — APPOINTMENT (OUTPATIENT)
Dept: RADIOLOGY | Facility: MEDICAL CENTER | Age: 57
DRG: 025 | End: 2022-12-10
Attending: PHYSICIAN ASSISTANT
Payer: COMMERCIAL

## 2022-12-10 PROBLEM — G93.5 BRAIN COMPRESSION (HCC): Status: ACTIVE | Noted: 2022-12-10

## 2022-12-10 PROBLEM — R94.31 T WAVE INVERSION IN EKG: Status: ACTIVE | Noted: 2022-12-10

## 2022-12-10 PROBLEM — G93.6 CEREBRAL EDEMA (HCC): Status: ACTIVE | Noted: 2022-12-10

## 2022-12-10 PROBLEM — D32.0 MENINGIOMA, CEREBRAL (HCC): Status: ACTIVE | Noted: 2022-12-10

## 2022-12-10 LAB
ALBUMIN SERPL BCP-MCNC: 2.8 G/DL (ref 3.2–4.9)
ALBUMIN/GLOB SERPL: 1.5 G/DL
ALP SERPL-CCNC: 53 U/L (ref 30–99)
ALT SERPL-CCNC: 20 U/L (ref 2–50)
ANION GAP SERPL CALC-SCNC: 10 MMOL/L (ref 7–16)
AST SERPL-CCNC: 18 U/L (ref 12–45)
BILIRUB SERPL-MCNC: 0.7 MG/DL (ref 0.1–1.5)
BUN SERPL-MCNC: 15 MG/DL (ref 8–22)
CALCIUM SERPL-MCNC: 8.5 MG/DL (ref 8.5–10.5)
CHLORIDE SERPL-SCNC: 104 MMOL/L (ref 96–112)
CO2 SERPL-SCNC: 28 MMOL/L (ref 20–33)
CREAT SERPL-MCNC: 0.58 MG/DL (ref 0.5–1.4)
ERYTHROCYTE [DISTWIDTH] IN BLOOD BY AUTOMATED COUNT: 45.1 FL (ref 35.9–50)
GFR SERPLBLD CREATININE-BSD FMLA CKD-EPI: 114 ML/MIN/1.73 M 2
GLOBULIN SER CALC-MCNC: 1.9 G/DL (ref 1.9–3.5)
GLUCOSE BLD STRIP.AUTO-MCNC: 158 MG/DL (ref 65–99)
GLUCOSE BLD STRIP.AUTO-MCNC: 159 MG/DL (ref 65–99)
GLUCOSE BLD STRIP.AUTO-MCNC: 193 MG/DL (ref 65–99)
GLUCOSE BLD STRIP.AUTO-MCNC: 216 MG/DL (ref 65–99)
GLUCOSE SERPL-MCNC: 171 MG/DL (ref 65–99)
HCT VFR BLD AUTO: 26.7 % (ref 42–52)
HGB BLD-MCNC: 9.5 G/DL (ref 14–18)
LV EJECT FRACT  99904: 65
LV EJECT FRACT MOD 2C 99903: 63.87
LV EJECT FRACT MOD 4C 99902: 62.93
LV EJECT FRACT MOD BP 99901: 63.52
MAGNESIUM SERPL-MCNC: 2.1 MG/DL (ref 1.5–2.5)
MCH RBC QN AUTO: 31 PG (ref 27–33)
MCHC RBC AUTO-ENTMCNC: 35.6 G/DL (ref 33.7–35.3)
MCV RBC AUTO: 87.3 FL (ref 81.4–97.8)
PHOSPHATE SERPL-MCNC: 2.7 MG/DL (ref 2.5–4.5)
PLATELET # BLD AUTO: 76 K/UL (ref 164–446)
PMV BLD AUTO: 11.2 FL (ref 9–12.9)
POTASSIUM SERPL-SCNC: 3.8 MMOL/L (ref 3.6–5.5)
PROT SERPL-MCNC: 4.7 G/DL (ref 6–8.2)
RBC # BLD AUTO: 3.06 M/UL (ref 4.7–6.1)
SODIUM SERPL-SCNC: 142 MMOL/L (ref 135–145)
TROPONIN T SERPL-MCNC: 12 NG/L (ref 6–19)
TROPONIN T SERPL-MCNC: 13 NG/L (ref 6–19)
TROPONIN T SERPL-MCNC: 14 NG/L (ref 6–19)
WBC # BLD AUTO: 14.3 K/UL (ref 4.8–10.8)

## 2022-12-10 PROCEDURE — 93306 TTE W/DOPPLER COMPLETE: CPT | Mod: 26 | Performed by: INTERNAL MEDICINE

## 2022-12-10 PROCEDURE — A9576 INJ PROHANCE MULTIPACK: HCPCS | Performed by: PHYSICIAN ASSISTANT

## 2022-12-10 PROCEDURE — 700111 HCHG RX REV CODE 636 W/ 250 OVERRIDE (IP): Performed by: HOSPITALIST

## 2022-12-10 PROCEDURE — A9270 NON-COVERED ITEM OR SERVICE: HCPCS | Performed by: STUDENT IN AN ORGANIZED HEALTH CARE EDUCATION/TRAINING PROGRAM

## 2022-12-10 PROCEDURE — 84484 ASSAY OF TROPONIN QUANT: CPT | Mod: 91

## 2022-12-10 PROCEDURE — 93010 ELECTROCARDIOGRAM REPORT: CPT | Performed by: STUDENT IN AN ORGANIZED HEALTH CARE EDUCATION/TRAINING PROGRAM

## 2022-12-10 PROCEDURE — A9270 NON-COVERED ITEM OR SERVICE: HCPCS | Performed by: PHYSICIAN ASSISTANT

## 2022-12-10 PROCEDURE — 84100 ASSAY OF PHOSPHORUS: CPT

## 2022-12-10 PROCEDURE — 85027 COMPLETE CBC AUTOMATED: CPT

## 2022-12-10 PROCEDURE — 770020 HCHG ROOM/CARE - TELE (206)

## 2022-12-10 PROCEDURE — A9270 NON-COVERED ITEM OR SERVICE: HCPCS | Performed by: INTERNAL MEDICINE

## 2022-12-10 PROCEDURE — 700117 HCHG RX CONTRAST REV CODE 255: Performed by: PHYSICIAN ASSISTANT

## 2022-12-10 PROCEDURE — 99233 SBSQ HOSP IP/OBS HIGH 50: CPT | Performed by: STUDENT IN AN ORGANIZED HEALTH CARE EDUCATION/TRAINING PROGRAM

## 2022-12-10 PROCEDURE — 93306 TTE W/DOPPLER COMPLETE: CPT

## 2022-12-10 PROCEDURE — 82962 GLUCOSE BLOOD TEST: CPT

## 2022-12-10 PROCEDURE — 700111 HCHG RX REV CODE 636 W/ 250 OVERRIDE (IP): Performed by: PHYSICIAN ASSISTANT

## 2022-12-10 PROCEDURE — A9270 NON-COVERED ITEM OR SERVICE: HCPCS | Performed by: NURSE PRACTITIONER

## 2022-12-10 PROCEDURE — 80053 COMPREHEN METABOLIC PANEL: CPT

## 2022-12-10 PROCEDURE — 700102 HCHG RX REV CODE 250 W/ 637 OVERRIDE(OP): Performed by: STUDENT IN AN ORGANIZED HEALTH CARE EDUCATION/TRAINING PROGRAM

## 2022-12-10 PROCEDURE — 70553 MRI BRAIN STEM W/O & W/DYE: CPT

## 2022-12-10 PROCEDURE — 700102 HCHG RX REV CODE 250 W/ 637 OVERRIDE(OP): Performed by: INTERNAL MEDICINE

## 2022-12-10 PROCEDURE — 83735 ASSAY OF MAGNESIUM: CPT

## 2022-12-10 PROCEDURE — 700102 HCHG RX REV CODE 250 W/ 637 OVERRIDE(OP): Performed by: PHYSICIAN ASSISTANT

## 2022-12-10 PROCEDURE — 93005 ELECTROCARDIOGRAM TRACING: CPT | Performed by: STUDENT IN AN ORGANIZED HEALTH CARE EDUCATION/TRAINING PROGRAM

## 2022-12-10 PROCEDURE — 36415 COLL VENOUS BLD VENIPUNCTURE: CPT

## 2022-12-10 PROCEDURE — 93005 ELECTROCARDIOGRAM TRACING: CPT | Performed by: NEUROLOGICAL SURGERY

## 2022-12-10 PROCEDURE — 700111 HCHG RX REV CODE 636 W/ 250 OVERRIDE (IP): Performed by: NURSE PRACTITIONER

## 2022-12-10 PROCEDURE — 700102 HCHG RX REV CODE 250 W/ 637 OVERRIDE(OP): Performed by: NURSE PRACTITIONER

## 2022-12-10 RX ORDER — DEXAMETHASONE SODIUM PHOSPHATE 4 MG/ML
10 INJECTION, SOLUTION INTRA-ARTICULAR; INTRALESIONAL; INTRAMUSCULAR; INTRAVENOUS; SOFT TISSUE EVERY 6 HOURS
Status: COMPLETED | OUTPATIENT
Start: 2022-12-10 | End: 2022-12-10

## 2022-12-10 RX ORDER — LOSARTAN POTASSIUM 50 MG/1
50 TABLET ORAL
Status: DISCONTINUED | OUTPATIENT
Start: 2022-12-10 | End: 2022-12-15 | Stop reason: HOSPADM

## 2022-12-10 RX ORDER — DEXAMETHASONE SODIUM PHOSPHATE 4 MG/ML
10 INJECTION, SOLUTION INTRA-ARTICULAR; INTRALESIONAL; INTRAMUSCULAR; INTRAVENOUS; SOFT TISSUE EVERY 12 HOURS
Status: COMPLETED | OUTPATIENT
Start: 2022-12-10 | End: 2022-12-11

## 2022-12-10 RX ORDER — DEXAMETHASONE SODIUM PHOSPHATE 4 MG/ML
2 INJECTION, SOLUTION INTRA-ARTICULAR; INTRALESIONAL; INTRAMUSCULAR; INTRAVENOUS; SOFT TISSUE DAILY
Status: COMPLETED | OUTPATIENT
Start: 2022-12-15 | End: 2022-12-15

## 2022-12-10 RX ORDER — DEXAMETHASONE SODIUM PHOSPHATE 4 MG/ML
4 INJECTION, SOLUTION INTRA-ARTICULAR; INTRALESIONAL; INTRAMUSCULAR; INTRAVENOUS; SOFT TISSUE EVERY 12 HOURS
Status: COMPLETED | OUTPATIENT
Start: 2022-12-12 | End: 2022-12-13

## 2022-12-10 RX ORDER — DEXAMETHASONE SODIUM PHOSPHATE 4 MG/ML
2 INJECTION, SOLUTION INTRA-ARTICULAR; INTRALESIONAL; INTRAMUSCULAR; INTRAVENOUS; SOFT TISSUE EVERY 12 HOURS
Status: DISPENSED | OUTPATIENT
Start: 2022-12-13 | End: 2022-12-14

## 2022-12-10 RX ORDER — DEXAMETHASONE SODIUM PHOSPHATE 4 MG/ML
6 INJECTION, SOLUTION INTRA-ARTICULAR; INTRALESIONAL; INTRAMUSCULAR; INTRAVENOUS; SOFT TISSUE EVERY 12 HOURS
Status: COMPLETED | OUTPATIENT
Start: 2022-12-11 | End: 2022-12-12

## 2022-12-10 RX ADMIN — CEFTRIAXONE SODIUM 2000 MG: 10 INJECTION, POWDER, FOR SOLUTION INTRAVENOUS at 17:19

## 2022-12-10 RX ADMIN — INSULIN HUMAN 2 UNITS: 100 INJECTION, SOLUTION PARENTERAL at 20:43

## 2022-12-10 RX ADMIN — INSULIN HUMAN 2 UNITS: 100 INJECTION, SOLUTION PARENTERAL at 07:53

## 2022-12-10 RX ADMIN — DEXAMETHASONE SODIUM PHOSPHATE 10 MG: 4 INJECTION, SOLUTION INTRA-ARTICULAR; INTRALESIONAL; INTRAMUSCULAR; INTRAVENOUS; SOFT TISSUE at 17:18

## 2022-12-10 RX ADMIN — INSULIN HUMAN 3 UNITS: 100 INJECTION, SOLUTION PARENTERAL at 11:50

## 2022-12-10 RX ADMIN — ACETAMINOPHEN 1000 MG: 500 TABLET ORAL at 17:16

## 2022-12-10 RX ADMIN — INSULIN HUMAN 2 UNITS: 100 INJECTION, SOLUTION PARENTERAL at 17:03

## 2022-12-10 RX ADMIN — LOSARTAN POTASSIUM 50 MG: 50 TABLET, FILM COATED ORAL at 17:16

## 2022-12-10 RX ADMIN — LEVETIRACETAM 500 MG: 500 TABLET, FILM COATED ORAL at 05:04

## 2022-12-10 RX ADMIN — DOCUSATE SODIUM 100 MG: 100 CAPSULE, LIQUID FILLED ORAL at 05:05

## 2022-12-10 RX ADMIN — LEVETIRACETAM 500 MG: 500 TABLET, FILM COATED ORAL at 17:16

## 2022-12-10 RX ADMIN — DEXAMETHASONE SODIUM PHOSPHATE 4 MG: 4 INJECTION, SOLUTION INTRA-ARTICULAR; INTRALESIONAL; INTRAMUSCULAR; INTRAVENOUS; SOFT TISSUE at 05:05

## 2022-12-10 RX ADMIN — INSULIN GLARGINE-YFGN 5 UNITS: 100 INJECTION, SOLUTION SUBCUTANEOUS at 17:07

## 2022-12-10 RX ADMIN — GADOTERIDOL 18 ML: 279.3 INJECTION, SOLUTION INTRAVENOUS at 21:40

## 2022-12-10 RX ADMIN — ACETAMINOPHEN 1000 MG: 500 TABLET ORAL at 00:38

## 2022-12-10 RX ADMIN — ACETAMINOPHEN 1000 MG: 500 TABLET ORAL at 23:39

## 2022-12-10 RX ADMIN — ACETAMINOPHEN 1000 MG: 500 TABLET ORAL at 05:05

## 2022-12-10 RX ADMIN — DOCUSATE SODIUM 100 MG: 100 CAPSULE, LIQUID FILLED ORAL at 17:16

## 2022-12-10 RX ADMIN — DEXAMETHASONE SODIUM PHOSPHATE 4 MG: 4 INJECTION, SOLUTION INTRA-ARTICULAR; INTRALESIONAL; INTRAMUSCULAR; INTRAVENOUS; SOFT TISSUE at 00:38

## 2022-12-10 RX ADMIN — DEXAMETHASONE SODIUM PHOSPHATE 10 MG: 4 INJECTION, SOLUTION INTRA-ARTICULAR; INTRALESIONAL; INTRAMUSCULAR; INTRAVENOUS; SOFT TISSUE at 11:50

## 2022-12-10 RX ADMIN — DEXAMETHASONE SODIUM PHOSPHATE 4 MG: 4 INJECTION, SOLUTION INTRA-ARTICULAR; INTRALESIONAL; INTRAMUSCULAR; INTRAVENOUS; SOFT TISSUE at 11:48

## 2022-12-10 RX ADMIN — DOCUSATE SODIUM 50 MG AND SENNOSIDES 8.6 MG 1 TABLET: 8.6; 5 TABLET, FILM COATED ORAL at 20:40

## 2022-12-10 RX ADMIN — CEFTRIAXONE SODIUM 2000 MG: 10 INJECTION, POWDER, FOR SOLUTION INTRAVENOUS at 05:05

## 2022-12-10 RX ADMIN — ONDANSETRON 4 MG: 2 INJECTION INTRAMUSCULAR; INTRAVENOUS at 22:11

## 2022-12-10 RX ADMIN — ACETAMINOPHEN 1000 MG: 500 TABLET ORAL at 11:49

## 2022-12-10 ASSESSMENT — ENCOUNTER SYMPTOMS
FEVER: 0
COUGH: 0
NAUSEA: 0
CHILLS: 0
DIZZINESS: 0
MYALGIAS: 0
SHORTNESS OF BREATH: 0
VOMITING: 0
PALPITATIONS: 0
ABDOMINAL PAIN: 0
HEADACHES: 1

## 2022-12-10 ASSESSMENT — PAIN DESCRIPTION - PAIN TYPE: TYPE: ACUTE PAIN

## 2022-12-10 NOTE — CARE PLAN
Problem: Bowel Elimination  Goal: Establish and maintain regular bowel function  Outcome: Progressing  Note: PRN Miralax given, patient up to bathroom hand held assist, patient had large BM during NOC shift.     Problem: Medication  Goal: Risk for seizure medication side effects will decrease  Outcome: Progressing  Note: Anti-seizure meds given per MAR; no seizures witnessed overnight.    The patient is Stable - Low risk of patient condition declining or worsening    Shift Goals  Clinical Goals: safety from seizures and have bowel movement  Patient Goals: rest  Family Goals: elizabeth    Progress made toward(s) clinical / shift goals:  see notes for progress    Patient is not progressing towards the following goals:

## 2022-12-10 NOTE — THERAPY
Speech Language Pathology   Initial Assessment     Patient Name: Ambrosio Hardy  AGE:  57 y.o., SEX:  male  Medical Record #: [id number]  Today's Date: 12/9/2022     Precautions  Precautions: Fall Risk  Comments: visual impairment post-op      HPI: 57 y.o. male admitted 11/30/2022 with headache x2 months. He presented to [facility] where CT showed occipital mass with shift. CT chest/abdomen/pelvis showed no evidence of metastasis. Pt underwent preoperative emobolization 12/6/22, on 12/7/22 he underwent operative resection with neurosurgery. Pt unable to see minimal light perception post op, possible 2/2 occipital stroke vs possible brain shift.  PMHx HTN CMHx Brain mass, SIRS, Hyponatremia, Hyperglycemia, HTN      Assessment    Prior level of function/Living situation:  Patient reported he lives alone and is I with ADLs/IADLs at baseline. He is currently unemployed and hasn’t worked for a “while”. He can stay with his niece post d/c, who can assist PRN.      Subjective:   Patient has visual deficits s/p resection. He reports being able to see shadows. Patient was alert and cooperative. He participated in the assessment to the best of his abilities.     Portions of the COGNISTAT-[place] Version (Neurobehavioral Cognitive Status Assessment) and other informal measures were administered.     Patient scored the following on the Cognistat:  Orientation: Average  Attention: Moderate  Comprehension (Language): Mild  Repetition (Language): Average   Naming (Language): unable to assess  Memory: SEVERE  Calculations: Moderate  Similarities (Reasoning): Moderate  Judgment (Reasoning): Mild     Based on formal and informal testing measures, pt presents with overall moderate-severe cognitive impairment in the areas of attention, repetition, short-term memory, auditory comprehension, reasoning and processing speed. Suspect impairments are acute s/p brain mass w/resection. Given a 4 word recall task, pt recalled 4/4  "immediately though forgot them instantly after one repetition. He could not recall or recognize any of the words after a 5-minute delay, likely because he never attended long enough to encode them. Pt was able to follow simple 1-step, 2-step and 3-step commands. Pt appeared to have difficulty getting into set for a task, often perseverating on his visual deficit and needing directions repeated. Reading/writing, naming and medication management were not assessed during this session d/t visual deficits.     Recommendations:  Direct supervision with IADLs, including medication management, financial management, scheduling, and cooking.      Plan    Recommend Speech Therapy 3 times per week until therapy goals are met for the following treatments:  Dysphagia Training and Patient / Family / Caregiver Education.         Objective       12/09/22 1620   Patient / Family Goals   Patient / Family Goal #1 \"I am fine\"   Short Term Goals   Short Term Goal # 1 Patient will recall new information after a 5 min delay with >85% accuracy given min cues   Short Term Goal # 2 Patient will complete a medication management task with >85% accuracy given min verbal cues     "

## 2022-12-10 NOTE — PROGRESS NOTES
Spent 20 min to update pt's status and discharge plan with pt's brothers. Language line in use. Will cont to monitor

## 2022-12-10 NOTE — PROGRESS NOTES
Tele monitor tech reported pt's ST elevated on V1 and depressed on V1 and V2. Pt assessed, denies chest pain but stated upper mid abd pain d/t gas. Pt resting in bed, NAD, EKG stat ordered per protocol. Notified PAC Haley. Will cont to monitor

## 2022-12-10 NOTE — CARE PLAN
The patient is Stable - Low risk of patient condition declining or worsening    Shift Goals  Clinical Goals: safety, monitor neuro status  Patient Goals: comfort  Family Goals: elizabeth    Progress made toward(s) clinical / shift goals:    Problem: Pain - Standard  Goal: Alleviation of pain or a reduction in pain to the patient’s comfort goal  Outcome: Progressing   Declines pain, tylenol on board  Problem: Fall Risk  Goal: Patient will remain free from falls  Outcome: Progressing   Fall precautions in place  Problem: Mobility  Goal: Patient's capacity to carry out activities will improve  Outcome: Progressing   Assisted pt up to bathroom, tolerated well,   Problem: Skin Integrity  Goal: Skin integrity is maintained or improved  Outcome: Progressing     Patient is not progressing towards the following goals:

## 2022-12-10 NOTE — PROGRESS NOTES
Neurosurgery Progress Note    Subjective:  Transferred to the floor  Pain controlled  Pt reports improvement in vision, blurred faces which is an improvement over light/dark perception    Exam:  A&O, speech fluent  EOM intact, facial symmetry  FS x4  Pt incorrectly guessed # of fingers held up but reports that he can see the outline of my face    BP  Min: 99/66  Max: 145/73  Pulse  Av  Min: 68  Max: 150  Resp  Av.2  Min: 14  Max: 25  Temp  Av °C (98.6 °F)  Min: 36.7 °C (98.1 °F)  Max: 37.3 °C (99.1 °F)  Monitored Temp 2  Av.2 °C (99 °F)  Min: 37.2 °C (99 °F)  Max: 37.2 °C (99 °F)  SpO2  Av.9 %  Min: 95 %  Max: 99 %    No data recorded    Recent Labs     12/08/22  0333 12/09/22  0605 12/10/22  0512   WBC 41.8* 18.5* 14.3*   RBC 4.33* 3.11* 3.06*   HEMOGLOBIN 13.4* 9.5* 9.5*   HEMATOCRIT 38.3* 27.0* 26.7*   MCV 88.5 86.8 87.3   MCH 30.9 30.5 31.0   MCHC 35.0 35.2 35.6*   RDW 46.9 46.7 45.1   PLATELETCT 113* 67* 76*   MPV 10.1 11.2 11.2       Recent Labs     12/08/22  0333 12/09/22  0605 12/10/22  0512   SODIUM 134* 137 142   POTASSIUM 5.5 4.1 3.8   CHLORIDE 107 107 104   CO2 19* 24 28   GLUCOSE 259* 174* 171*   BUN 23* 15 15   CREATININE 0.81 0.56 0.58   CALCIUM 8.0* 8.2* 8.5                   Intake/Output                         22 - 12/10/22 0659 12/10/22 0700 - 22 Total  Total                 Intake    P.O.  120  -- 120  --  -- --    P.O. 120 -- 120 -- -- --    I.V.  77.8  -- 77.8  --  -- --    Phenylephrine Volume 77.8 -- 77.8 -- -- --    IV Piggyback  995.1  -- 995.1  --  -- --    Volume (mL) (vancomycin (VANCOCIN) 1,750 mg in  mL IVPB) 995.1 -- 995.1 -- -- --    Total Intake 1192.9 -- 1192.9 -- -- --       Output    Urine  2400  1775 4175  --  -- --    Number of Times Voided 4 x 4 x 8 x -- -- --    Urine Void (mL) 950 1775 2725 -- -- --    Output (mL) ([REMOVED] Urethral Catheter Non-latex;Temperature probe 16 Fr.)  1450 -- 1450 -- -- --    Drains  75  -- 75  --  -- --    Output (mL) ([REMOVED] Closed/Suction Drain Inferior Scalp Hemovac) 75 -- 75 -- -- --    Stool  --  -- --  --  -- --    Number of Times Stooled 0 x 1 x 1 x -- -- --    Total Output 2475 1775 4250 -- -- --       Net I/O     -1282.1 -1775 -3057.1 -- -- --              Intake/Output Summary (Last 24 hours) at 12/10/2022 1036  Last data filed at 12/10/2022 0600  Gross per 24 hour   Intake 491.23 ml   Output 2725 ml   Net -2233.77 ml               dexamethasone  10 mg Q6HRS    Followed by    dexamethasone  10 mg Q12HRS    Followed by    [START ON 12/11/2022] dexamethasone  6 mg Q12HRS    Followed by    [START ON 12/12/2022] dexamethasone  4 mg Q12HRS    Followed by    [START ON 12/13/2022] dexamethasone  2 mg Q12HRS    Followed by    [START ON 12/15/2022] dexamethasone  2 mg DAILY    artificial tears  1 Application PRN    insulin regular  2-9 Units 4X/DAY ACHS    And    dextrose bolus  25 g Q15 MIN PRN    levETIRAcetam  500 mg BID    Pharmacy   PHARMACY TO DOSE    acetaminophen  1,000 mg Q6HRS    cefTRIAXone (ROCEPHIN) IV  2,000 mg Q12HRS    Pharmacy Consult Request  1 Each PHARMACY TO DOSE    MD ALERT...DO NOT ADMINISTER NSAIDS or ASPIRIN unless ORDERED By Neurosurgery  1 Each PRN    ondansetron  4 mg Q4HRS PRN    dexamethasone  4 mg Once PRN    diphenhydrAMINE  25 mg Q6HRS PRN    scopolamine  1 Patch Q72HRS PRN    docusate sodium  100 mg BID    senna-docusate  1 Tablet Nightly    senna-docusate  1 Tablet Q24HRS PRN    polyethylene glycol/lytes  1 Packet BID PRN    magnesium hydroxide  30 mL QDAY PRN    bisacodyl  10 mg Q24HRS PRN    sodium phosphate  1 Each Once PRN    oxyCODONE immediate-release  5 mg Q3HRS PRN    Or    oxyCODONE immediate-release  10 mg Q3HRS PRN    Or    HYDROmorphone  0.5 mg Q3HRS PRN    diphenhydrAMINE  25 mg Q6HRS PRN    Or    diphenhydrAMINE  25 mg Q6HRS PRN    benzocaine-menthol  1 Lozenge Q2HRS PRN    Respiratory Therapy Consult    Continuous RT    ondansetron  4 mg Q4HRS PRN    promethazine  12.5-25 mg Q4HRS PRN    promethazine  12.5-25 mg Q4HRS PRN    prochlorperazine  5-10 mg Q4HRS PRN       Assessment and Plan:  Hospital day # 3  POD# 3 brain tumor, craniotomy for resection   Chemical prophylactic DVT therapy: No  Start date/time: tbd    Ct head stable  Keppra 500 BID  Dex taper  PT/OT, recommending placement. PMR consult placed  Continue pain control, bowel regimen  MRI pending    Addendum: abnormal tele finding, EKG confirmed abnormal t wave, possible ischemia now present.   Troponin ordered, Hgb stable at 9.5. mildly hypertensive with remaining vitals stable  Hospitalist team consulted for assistance with medical mgmt

## 2022-12-10 NOTE — DISCHARGE PLANNING
[facility] Transitional Care Coordination    Referral from: ISRRAEL Tenorio  Insurance Provider on Facesheet: [facility]  Potential Rehab Diagnosis: Brain mass    Chart review indicates patient may have on going medical management and may have therapy needs to possibly meet inpatient rehab facility criteria with the goal of returning to community.    D/C support: TBD     Physiatry consultation forwarded per protocol.     Physiatry to consult - [facility] is not contracted with [facility], [facility] will no longer follow.     Thank you for the referral.

## 2022-12-11 ENCOUNTER — APPOINTMENT (OUTPATIENT)
Dept: RADIOLOGY | Facility: MEDICAL CENTER | Age: 57
DRG: 025 | End: 2022-12-11
Attending: STUDENT IN AN ORGANIZED HEALTH CARE EDUCATION/TRAINING PROGRAM
Payer: COMMERCIAL

## 2022-12-11 PROBLEM — D72.829 LEUKOCYTOSIS: Status: ACTIVE | Noted: 2022-12-11

## 2022-12-11 PROBLEM — I63.531 ACUTE ISCHEMIC RIGHT PCA STROKE (HCC): Status: ACTIVE | Noted: 2022-12-11

## 2022-12-11 PROBLEM — I97.821: Status: ACTIVE | Noted: 2022-12-11

## 2022-12-11 PROBLEM — E87.6 HYPOKALEMIA: Status: ACTIVE | Noted: 2022-12-11

## 2022-12-11 LAB
ALBUMIN SERPL BCP-MCNC: 3 G/DL (ref 3.2–4.9)
ALBUMIN/GLOB SERPL: 1.8 G/DL
ALP SERPL-CCNC: 52 U/L (ref 30–99)
ALT SERPL-CCNC: 21 U/L (ref 2–50)
ANION GAP SERPL CALC-SCNC: 10 MMOL/L (ref 7–16)
AST SERPL-CCNC: 19 U/L (ref 12–45)
BILIRUB SERPL-MCNC: 0.6 MG/DL (ref 0.1–1.5)
BUN SERPL-MCNC: 15 MG/DL (ref 8–22)
CALCIUM SERPL-MCNC: 8.5 MG/DL (ref 8.5–10.5)
CHLORIDE SERPL-SCNC: 99 MMOL/L (ref 96–112)
CO2 SERPL-SCNC: 28 MMOL/L (ref 20–33)
CREAT SERPL-MCNC: 0.62 MG/DL (ref 0.5–1.4)
EKG IMPRESSION: NORMAL
ERYTHROCYTE [DISTWIDTH] IN BLOOD BY AUTOMATED COUNT: 44.5 FL (ref 35.9–50)
GFR SERPLBLD CREATININE-BSD FMLA CKD-EPI: 111 ML/MIN/1.73 M 2
GLOBULIN SER CALC-MCNC: 1.7 G/DL (ref 1.9–3.5)
GLUCOSE BLD STRIP.AUTO-MCNC: 149 MG/DL (ref 65–99)
GLUCOSE BLD STRIP.AUTO-MCNC: 166 MG/DL (ref 65–99)
GLUCOSE BLD STRIP.AUTO-MCNC: 189 MG/DL (ref 65–99)
GLUCOSE BLD STRIP.AUTO-MCNC: 276 MG/DL (ref 65–99)
GLUCOSE SERPL-MCNC: 159 MG/DL (ref 65–99)
HCT VFR BLD AUTO: 26.8 % (ref 42–52)
HGB BLD-MCNC: 9.4 G/DL (ref 14–18)
MAGNESIUM SERPL-MCNC: 2 MG/DL (ref 1.5–2.5)
MCH RBC QN AUTO: 30.7 PG (ref 27–33)
MCHC RBC AUTO-ENTMCNC: 35.1 G/DL (ref 33.7–35.3)
MCV RBC AUTO: 87.6 FL (ref 81.4–97.8)
PHOSPHATE SERPL-MCNC: 3.6 MG/DL (ref 2.5–4.5)
PLATELET # BLD AUTO: 100 K/UL (ref 164–446)
PMV BLD AUTO: 10.7 FL (ref 9–12.9)
POTASSIUM SERPL-SCNC: 3.4 MMOL/L (ref 3.6–5.5)
PROT SERPL-MCNC: 4.7 G/DL (ref 6–8.2)
RBC # BLD AUTO: 3.06 M/UL (ref 4.7–6.1)
SODIUM SERPL-SCNC: 137 MMOL/L (ref 135–145)
TROPONIN T SERPL-MCNC: 11 NG/L (ref 6–19)
TROPONIN T SERPL-MCNC: 12 NG/L (ref 6–19)
TROPONIN T SERPL-MCNC: 13 NG/L (ref 6–19)
TROPONIN T SERPL-MCNC: 15 NG/L (ref 6–19)
WBC # BLD AUTO: 17.3 K/UL (ref 4.8–10.8)

## 2022-12-11 PROCEDURE — A9270 NON-COVERED ITEM OR SERVICE: HCPCS | Performed by: STUDENT IN AN ORGANIZED HEALTH CARE EDUCATION/TRAINING PROGRAM

## 2022-12-11 PROCEDURE — 770020 HCHG ROOM/CARE - TELE (206)

## 2022-12-11 PROCEDURE — 85027 COMPLETE CBC AUTOMATED: CPT

## 2022-12-11 PROCEDURE — 700102 HCHG RX REV CODE 250 W/ 637 OVERRIDE(OP): Performed by: NURSE PRACTITIONER

## 2022-12-11 PROCEDURE — 700111 HCHG RX REV CODE 636 W/ 250 OVERRIDE (IP): Performed by: NURSE PRACTITIONER

## 2022-12-11 PROCEDURE — A9270 NON-COVERED ITEM OR SERVICE: HCPCS | Performed by: NURSE PRACTITIONER

## 2022-12-11 PROCEDURE — A9270 NON-COVERED ITEM OR SERVICE: HCPCS | Performed by: PHYSICIAN ASSISTANT

## 2022-12-11 PROCEDURE — 700102 HCHG RX REV CODE 250 W/ 637 OVERRIDE(OP): Performed by: INTERNAL MEDICINE

## 2022-12-11 PROCEDURE — 700102 HCHG RX REV CODE 250 W/ 637 OVERRIDE(OP): Performed by: STUDENT IN AN ORGANIZED HEALTH CARE EDUCATION/TRAINING PROGRAM

## 2022-12-11 PROCEDURE — 99233 SBSQ HOSP IP/OBS HIGH 50: CPT | Performed by: STUDENT IN AN ORGANIZED HEALTH CARE EDUCATION/TRAINING PROGRAM

## 2022-12-11 PROCEDURE — 82962 GLUCOSE BLOOD TEST: CPT

## 2022-12-11 PROCEDURE — 99254 IP/OBS CNSLTJ NEW/EST MOD 60: CPT | Performed by: PHYSICAL MEDICINE & REHABILITATION

## 2022-12-11 PROCEDURE — 80053 COMPREHEN METABOLIC PANEL: CPT

## 2022-12-11 PROCEDURE — 74018 RADEX ABDOMEN 1 VIEW: CPT

## 2022-12-11 PROCEDURE — 93010 ELECTROCARDIOGRAM REPORT: CPT | Performed by: STUDENT IN AN ORGANIZED HEALTH CARE EDUCATION/TRAINING PROGRAM

## 2022-12-11 PROCEDURE — 700111 HCHG RX REV CODE 636 W/ 250 OVERRIDE (IP): Performed by: PHYSICIAN ASSISTANT

## 2022-12-11 PROCEDURE — 700102 HCHG RX REV CODE 250 W/ 637 OVERRIDE(OP): Performed by: PHYSICIAN ASSISTANT

## 2022-12-11 PROCEDURE — 83735 ASSAY OF MAGNESIUM: CPT

## 2022-12-11 PROCEDURE — 36415 COLL VENOUS BLD VENIPUNCTURE: CPT

## 2022-12-11 PROCEDURE — A9270 NON-COVERED ITEM OR SERVICE: HCPCS | Performed by: INTERNAL MEDICINE

## 2022-12-11 PROCEDURE — 93010 ELECTROCARDIOGRAM REPORT: CPT | Performed by: INTERNAL MEDICINE

## 2022-12-11 PROCEDURE — 84484 ASSAY OF TROPONIN QUANT: CPT | Mod: 91

## 2022-12-11 PROCEDURE — 84100 ASSAY OF PHOSPHORUS: CPT

## 2022-12-11 RX ORDER — POTASSIUM CHLORIDE 20 MEQ/1
40 TABLET, EXTENDED RELEASE ORAL EVERY 6 HOURS
Status: COMPLETED | OUTPATIENT
Start: 2022-12-11 | End: 2022-12-11

## 2022-12-11 RX ORDER — SIMETHICONE 125 MG
125 TABLET,CHEWABLE ORAL 3 TIMES DAILY PRN
Status: DISCONTINUED | OUTPATIENT
Start: 2022-12-11 | End: 2022-12-15 | Stop reason: HOSPADM

## 2022-12-11 RX ADMIN — MAGNESIUM HYDROXIDE 30 ML: 400 SUSPENSION ORAL at 05:40

## 2022-12-11 RX ADMIN — DOCUSATE SODIUM 50 MG AND SENNOSIDES 8.6 MG 1 TABLET: 8.6; 5 TABLET, FILM COATED ORAL at 21:07

## 2022-12-11 RX ADMIN — ONDANSETRON 4 MG: 2 INJECTION INTRAMUSCULAR; INTRAVENOUS at 17:31

## 2022-12-11 RX ADMIN — ACETAMINOPHEN 1000 MG: 500 TABLET ORAL at 05:40

## 2022-12-11 RX ADMIN — INSULIN GLARGINE-YFGN 5 UNITS: 100 INJECTION, SOLUTION SUBCUTANEOUS at 17:45

## 2022-12-11 RX ADMIN — INSULIN HUMAN 5 UNITS: 100 INJECTION, SOLUTION PARENTERAL at 21:08

## 2022-12-11 RX ADMIN — DEXAMETHASONE SODIUM PHOSPHATE 6 MG: 4 INJECTION, SOLUTION INTRA-ARTICULAR; INTRALESIONAL; INTRAMUSCULAR; INTRAVENOUS; SOFT TISSUE at 17:25

## 2022-12-11 RX ADMIN — INSULIN HUMAN 2 UNITS: 100 INJECTION, SOLUTION PARENTERAL at 08:56

## 2022-12-11 RX ADMIN — CEFTRIAXONE SODIUM 2000 MG: 10 INJECTION, POWDER, FOR SOLUTION INTRAVENOUS at 17:30

## 2022-12-11 RX ADMIN — ACETAMINOPHEN 1000 MG: 500 TABLET ORAL at 11:42

## 2022-12-11 RX ADMIN — POTASSIUM CHLORIDE 40 MEQ: 1500 TABLET, EXTENDED RELEASE ORAL at 17:26

## 2022-12-11 RX ADMIN — OXYCODONE HYDROCHLORIDE 10 MG: 10 TABLET ORAL at 14:00

## 2022-12-11 RX ADMIN — SIMETHICONE 125 MG: 125 TABLET, CHEWABLE ORAL at 14:33

## 2022-12-11 RX ADMIN — DOCUSATE SODIUM 100 MG: 100 CAPSULE, LIQUID FILLED ORAL at 05:40

## 2022-12-11 RX ADMIN — INSULIN HUMAN 2 UNITS: 100 INJECTION, SOLUTION PARENTERAL at 11:37

## 2022-12-11 RX ADMIN — LEVETIRACETAM 500 MG: 500 TABLET, FILM COATED ORAL at 05:40

## 2022-12-11 RX ADMIN — DOCUSATE SODIUM 100 MG: 100 CAPSULE, LIQUID FILLED ORAL at 17:26

## 2022-12-11 RX ADMIN — OXYCODONE HYDROCHLORIDE 10 MG: 10 TABLET ORAL at 17:24

## 2022-12-11 RX ADMIN — DEXAMETHASONE SODIUM PHOSPHATE 10 MG: 4 INJECTION, SOLUTION INTRA-ARTICULAR; INTRALESIONAL; INTRAMUSCULAR; INTRAVENOUS; SOFT TISSUE at 05:40

## 2022-12-11 RX ADMIN — CEFTRIAXONE SODIUM 2000 MG: 10 INJECTION, POWDER, FOR SOLUTION INTRAVENOUS at 05:53

## 2022-12-11 RX ADMIN — LEVETIRACETAM 500 MG: 500 TABLET, FILM COATED ORAL at 17:25

## 2022-12-11 RX ADMIN — ACETAMINOPHEN 1000 MG: 500 TABLET ORAL at 17:25

## 2022-12-11 RX ADMIN — POLYETHYLENE GLYCOL 3350 1 PACKET: 17 POWDER, FOR SOLUTION ORAL at 01:28

## 2022-12-11 RX ADMIN — LOSARTAN POTASSIUM 50 MG: 50 TABLET, FILM COATED ORAL at 05:40

## 2022-12-11 RX ADMIN — POTASSIUM CHLORIDE 40 MEQ: 1500 TABLET, EXTENDED RELEASE ORAL at 11:53

## 2022-12-11 ASSESSMENT — PAIN DESCRIPTION - PAIN TYPE
TYPE: ACUTE PAIN

## 2022-12-11 ASSESSMENT — ENCOUNTER SYMPTOMS
HEADACHES: 0
ABDOMINAL PAIN: 0
SHORTNESS OF BREATH: 0
PALPITATIONS: 0
MYALGIAS: 0
COUGH: 0
NAUSEA: 0
FEVER: 0
VOMITING: 0
NERVOUS/ANXIOUS: 0
DIZZINESS: 0
INSOMNIA: 0
CHILLS: 0

## 2022-12-11 NOTE — PROGRESS NOTES
Hospital Medicine Daily Progress Note    Date of Service  12/10/2022    Chief Complaint  Ambrosio Hardy is a 57 y.o. male admitted 11/30/2022 with headache    Hospital Course  56 yo man with HTN who presented with headaches for 2 months, with blurry vision and pain is severe. CTH showed occipital mass with midline shift at [facility] and sent to [facility]. Dr. Donis with neurosurgery was consulted. He is started on decadron and keppra. CT C/A/P did not show any other tumors.    Interval Problem Update  12/10: Medicine had previously signed off.  However, notified of T wave inversions on EKG.  Patient currently denies any chest pain although he reports some discomfort in his stomach.  Initial troponin was negative.  I have ordered 2 additional troponins.  No nausea vomiting.  Patient is status post resection of meningioma postop day #3.  He denies history of diabetes.    I personally reviewed the EKG below.  Per my read normal sinus rhythm with QTC of 430, T wave inversions in V1, V2, V3, which are new and dynamic.          I have discussed this patient's plan of care and discharge plan at IDT rounds today with Case Management, Nursing, Nursing leadership, and other members of the IDT team.    Consultants/Specialty  neurosurgery    Code Status  Full Code    Disposition  Patient is not medically cleared for discharge.   Anticipate discharge to to home with close outpatient follow-up.  I have placed the appropriate orders for post-discharge needs.    Review of Systems  Review of Systems   Constitutional:  Negative for chills and fever.   Respiratory:  Negative for cough and shortness of breath.    Cardiovascular:  Negative for chest pain and palpitations.   Gastrointestinal:  Negative for abdominal pain, nausea and vomiting.   Genitourinary:  Negative for dysuria and hematuria.   Musculoskeletal:  Negative for joint pain and myalgias.   Neurological:  Positive for headaches. Negative for dizziness.       Physical Exam  Temp:  [36.7 °C (98.1 °F)-37.3 °C (99.1 °F)] 37.3 °C (99.1 °F)  Pulse:  [] 72  Resp:  [18] 18  BP: (117-145)/(60-82) 138/74  SpO2:  [95 %-97 %] 95 %    Physical Exam  Vitals and nursing note reviewed.   Constitutional:       General: He is not in acute distress.     Appearance: Normal appearance. He is not ill-appearing.   HENT:      Head: Normocephalic and atraumatic.      Comments: Craniotomy incision clean dry and intact     Mouth/Throat:      Mouth: Mucous membranes are moist.      Pharynx: Oropharynx is clear. No oropharyngeal exudate.   Eyes:      General: No scleral icterus.        Right eye: No discharge.         Left eye: No discharge.      Conjunctiva/sclera: Conjunctivae normal.   Cardiovascular:      Rate and Rhythm: Normal rate and regular rhythm.      Pulses: Normal pulses.      Heart sounds: Normal heart sounds. No murmur heard.  Pulmonary:      Effort: Pulmonary effort is normal. No respiratory distress.      Breath sounds: Normal breath sounds.   Abdominal:      General: Abdomen is flat. Bowel sounds are normal. There is no distension.      Palpations: Abdomen is soft.   Musculoskeletal:         General: No swelling.      Cervical back: Neck supple. No tenderness.      Right lower leg: No edema.      Left lower leg: No edema.   Skin:     General: Skin is warm and dry.      Coloration: Skin is not pale.   Neurological:      Mental Status: He is alert. Mental status is at baseline. He is disoriented.      Motor: No weakness.      Comments: Alert and orient x2.  He is disoriented to year but oriented to month.   Psychiatric:         Thought Content: Thought content normal.         Judgment: Judgment normal.       Fluids    Intake/Output Summary (Last 24 hours) at 12/10/2022 1616  Last data filed at 12/10/2022 0600  Gross per 24 hour   Intake no documentation   Output 1775 ml   Net -1775 ml       Laboratory  Recent Labs     12/08/22  0333 12/09/22  0605 12/10/22  0512   WBC  41.8* 18.5* 14.3*   RBC 4.33* 3.11* 3.06*   HEMOGLOBIN 13.4* 9.5* 9.5*   HEMATOCRIT 38.3* 27.0* 26.7*   MCV 88.5 86.8 87.3   MCH 30.9 30.5 31.0   MCHC 35.0 35.2 35.6*   RDW 46.9 46.7 45.1   PLATELETCT 113* 67* 76*   MPV 10.1 11.2 11.2       Recent Labs     12/08/22  0333 12/09/22  0605 12/10/22  0512   SODIUM 134* 137 142   POTASSIUM 5.5 4.1 3.8   CHLORIDE 107 107 104   CO2 19* 24 28   GLUCOSE 259* 174* 171*   BUN 23* 15 15   CREATININE 0.81 0.56 0.58   CALCIUM 8.0* 8.2* 8.5                       Imaging  DX-CHEST-PORTABLE (1 VIEW)   Final Result         1.  Interstitial pulmonary parenchymal prominence, compatible with interstitial edema and/or infiltrates, slightly more pronounced compared to prior study.   2.  Cardiomegaly      DX-CHEST-PORTABLE (1 VIEW)   Final Result         1.  Interstitial pulmonary parenchymal prominence, compatible with interstitial edema and/or infiltrates.      CT-HEAD W/O   Final Result         1.  Streaky hyperdense hemorrhage and pneumocephalus within occipital surgical bed, within expected limits for recent postop changes.   2.  Atherosclerosis.         DX-CHEST-PORTABLE (1 VIEW)   Final Result         1.  No acute cardiopulmonary disease.      DX-CHEST-PORTABLE (1 VIEW)   Final Result      No acute cardiopulmonary abnormality.      IR-EMBOLIZE-NEURO-INTRACRANIAL   Final Result   Impression:      57-year-old patient with a large bilateral midline parieto-occipital meningioma underwent cerebral angiography and embolization as described above.      Tumor blush with feeders from the right middle meningeal, left middle meningeal, right occipital artery were embolized to occlusion using a combination of particles and Gelfoam.      Additional leptomeningeal feeders to the meningioma arise from left MCA, DENISSE, PCA.      A small arterial venous shunt is also noted within the lesion arising from small branches of the left posterior cerebral artery.      Occlusion of the posterior segment of the  superior sagittal sinus is demonstrated due to invasion by the tumor. Multiple collateral veins have developed around the occluded segment with predominant anterior drainage into the superior anastomotic veins.      IAdelina was physically present and participated during the entire procedure of the IR-EMBOLIZE-NEURO-INTRACRANIAL.      MR-STEALTH BRAIN WITH & W/O   Final Result      There is an approximately 6.7 x 4.7 x 5.9 cm sized enhancing extra-axial mass noted in the bilateral mid posterior parietal region along the both sides of posterior falx cerebri. The superior sagittal sinus is infiltrated and surrounded by this tumor.    There is also abnormal adjacent dural enhancement .The adjacent parietal/occipital bones are infiltrated by this tumor. There is also hyperostosis of the calvarial surface of the bones with enhancement. There are multiple enlarged blood vessels are noted    along the anterior margin of the lesion. There is diffuse white matter edema noted in the bilateral parietal and occipital lobes surrounding the lesion.There is an approximately 6 mm midline shift towards right side. 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 The other differential diagnosis includes hemangiopericytoma and dural based metastasis. Findings were discussed with SUSY PRATT on 12/1/2022 7:43 AM.      MR-BRAIN-WITH & W/O   Final Result      There is an approximately 6.7 x 4.7 x 5.9 cm sized enhancing extra-axial mass noted in the bilateral mid posterior parietal region along the both sides of posterior falx cerebri. The superior sagittal sinus is infiltrated and surrounded by this tumor.    There is also abnormal adjacent dural enhancement .The adjacent parietal/occipital bones are infiltrated by this tumor. There is also hyperostosis of the calvarial surface of the bones with enhancement. There are multiple enlarged blood  vessels are noted    along the anterior margin of the lesion. There is diffuse white matter edema noted in the bilateral parietal and occipital lobes surrounding the lesion.There is an approximately 6 mm midline shift towards right side. 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 The other differential diagnosis includes hemangiopericytoma and dural based metastasis. Findings were discussed with SUSY PRATT on 12/1/2022 7:43 AM.      CT-CHEST,ABDOMEN,PELVIS WITH   Final Result         1.  No significant abnormality in thorax, abdomen and pelvis CT scan.   2.  Hepatomegaly   3.  Cholelithiasis   4.  Segment 2 duodenal diverticula   5.  Atherosclerosis   6.  Fat-containing left inguinal hernia   7.  Fat-containing umbilical hernia   8.  Diverticulosis   9.  Main pulmonary artery dilatation, appearance suggests changes related to pulmonary artery aneurysm or pulmonary hypertension.      MR-BRAIN-WITH & W/O    (Results Pending)          Assessment/Plan       * T wave inversion in EKG  Assessment & Plan  12/10/2022:  New T wave inversions without any chest pain.  Troponin initially negative.  Plan will be to check serial troponins and continue telemetry monitoring.  Serial EKGs as needed for chest pain.  If troponins become positive, we will have to consider possibility of NSTEMI and consult cardiology.  If troponins are negative, recommend proceeding with MPI stress test.  I have ordered an echocardiogram.     We will continue to follow.    Meningioma, cerebral (HCC)- (present on admission)  Assessment & Plan  As per pathology.  Status post resection 12/7/2022.    Cerebral edema (HCC)- (present on admission)  Assessment & Plan  Associated with brain compression secondary to meningioma.    Continue dexamethasone    Brain compression (HCC)- (present on admission)  Assessment & Plan  Secondary to large meningioma.  Significant brain  compression due to the meningioma.  Associated with vasogenic edema.    Status post resection 12/7/2022.    Hyponatremia- (present on admission)  Assessment & Plan  Resolved    Hyperglycemia- (present on admission)  Assessment & Plan  A1c 6.4%    12/10/2022:  Blood glucose worsening into the 200s secondary to dexamethasone.  Start glargine insulin 5 units daily  Continue lispro insulin sliding scale with hypoglycemia protocol      Hypertension- (present on admission)  Assessment & Plan  Blood pressures controlled.  Goal of less than 130/80.    Resume home losartan at lower dose of 50 mg daily    Brain mass- (present on admission)  Assessment & Plan  Status post resection of meningioma 12/17/2022.  Management as per neurosurgery.      I have performed a physical exam and reviewed and updated ROS and Plan today (12/10/2022). In review of yesterday's note (12/9/2022), there are no changes except as documented above.    VTE prophylaxis:     SCDs/TEDs

## 2022-12-11 NOTE — PROGRESS NOTES
Pt had breakfast and coffee this am, couldn't have a cardiac stress test this am. Per tech pt can have his test tomorrow am. Will keep pt NPO at midnight. Notified dr. Rodriguez

## 2022-12-11 NOTE — PROGRESS NOTES
"Patient again nauseous at 0200, some emeses. Patient stated he felt \"bloated\". RN gave PRN Miralax, patient tried walking in place at bedside for comfort, and then went back to bed. VSS, no acute chest pain or SOB. Updated on call hospitalist, Ashley Mahajan. Suggest to continue to monitor.   "

## 2022-12-11 NOTE — HOSPITAL COURSE
Ambrosio Hardy is a 57 y.o. male who presented 11/30/2022 with headaches. This is a pleasant gentleman with a history of hypertension who presented with worsening headaches for 2 months.  The headache was moderate to severe.  Patient did not have any focal weaknesses or paresthesias.  He did have intermittent blurry vision although on presentation he was not complaining of any significant vision problems.  He presented to [facility] in [place], where a head CT showed possible occipital mass with midline shift.  Patient was subsequently transferred to [facility] for a neurosurgical consultation.  The MRI showed a large occipital homogeneously enhancing mass consistent with a meningioma.  Neurosurgery was consulted and the patient was started on dexamethasone due to cerebral edema secondary to brain compression from the tumor.    The patient underwent embolization of large feeders to the meningioma from the right occipital artery, right MMA, and left MMA using 150-300 UM particles and Gelfoam by Dr. Mcdonald of interventional radiology on 12/5/2022.  Patient subsequently underwent resection of the meningioma on 12/7/2022 by Dr. Dieudonne Donis. There was large amount of blood loss requiring multiple units of blood transfusion including 4 units of PRBCs and 4 units of FFP.  Patient was readmitted to the intensive care unit intubated.  He was extubated on 12/8/2022.    Post patient developed some inability to perceive light, which was concerning for a occipital stroke versus brain shift.  MRI of the brain showed a small right posterior cerebral artery stroke.    On 12/10/2022, patient developed some stomach discomfort associated with dynamic T wave inversions on EKG.  Troponins were negative x3.  Echocardiogram showed an ejection fraction of 60 to 65% with no regional wall abnormalities.  Enlarged right ventricle was noted.  Patient underwent a MPI stress test, which was  negative.      Patient continues to have significant abdominal pain.  Serial abdominal x-rays were performed, which showed a foreign body moving along the lower GI tract.

## 2022-12-11 NOTE — PROGRESS NOTES
Hospital Medicine Daily Progress Note    Date of Service  12/11/2022    Chief Complaint  Ambrosio Hardy is a 57 y.o. male admitted 11/30/2022 with headache    Hospital Course  Ambrosio Hardy is a 57 y.o. male who presented 11/30/2022 with headaches. This is a pleasant gentleman with a history of hypertension who presented with worsening headaches for 2 months.  The headache was moderate to severe.  Patient did not have any focal weaknesses or paresthesias.  He did have intermittent blurry vision although on presentation he was not complaining of any significant vision problems.  He presented to [facility] in [place], where a head CT showed possible occipital mass with midline shift.  Patient was subsequently transferred to [facility] for a neurosurgical consultation.  The MRI showed a large occipital homogeneously enhancing mass consistent with a meningioma.  Neurosurgery was consulted and the patient was started on dexamethasone due to cerebral edema secondary to brain compression from the tumor.    The patient underwent embolization of large feeders to the meningioma from the right occipital artery, right MMA, and left MMA using 150-300 UM particles and Gelfoam by Dr. Mcdonald of interventional radiology on 12/5/2022.  Patient subsequently underwent resection of the meningioma on 12/7/2022 by Dr. Dieudonne Donis. There was large amount of blood loss requiring multiple units of blood transfusion including 4 units of PRBCs and 4 units of FFP.  Patient was readmitted to the intensive care unit intubated.  He was extubated on 12/8/2022.    Post patient developed some inability to perceive light, which was concerning for a occipital stroke versus brain shift.  MRI of the brain showed a small right posterior cerebral artery stroke.    On 12/10/2022, patient developed some stomach discomfort associated with dynamic T wave inversions on EKG.  Troponins were  negative x3.  Echocardiogram showed an ejection fraction of 60 to 65% with no regional wall abnormalities.  Enlarged right ventricle was noted.  Patient underwent a MPI stress test.        Interval Problem Update  12/10: Medicine had previously signed off.  However, notified of T wave inversions on EKG.  Patient currently denies any chest pain although he reports some discomfort in his stomach.  Initial troponin was negative.  I have ordered 2 additional troponins.  No nausea vomiting.  Patient is status post resection of meningioma postop day #3.  He denies history of diabetes.    12/11: No significant events overnight.  No more stomach discomfort overnight.  Repeat EKG yesterday showing stable T wave inversions.  Troponins negative x3.  Echocardiogram showing normal ejection fraction of 65%.  No significant valvular disease.  Large right ventricular size.  MPI stress test ordered.  Unfortunately patient ate and had coffee this morning so would not be able to go until tomorrow.  Awaiting physiatry consultation.  Planning to discharge to inpatient acute rehabilitation versus skilled nursing facility.      Personally reviewed the MRI of the brain with and without contrast performed on 12/10/2022.  Per my read no remaining enhancing mass in the left occipital area.  There is increased DWI signal in the right occipital area consistent with a small PCA stroke.  There is also increased T2 signal consistent with mild cerebral edema.      I personally reviewed the EKG below.  Per my read normal sinus rhythm with heart rate of 74, QTC of 460, T wave inversions in V1, V2, V3, which are stable.              I have discussed this patient's plan of care and discharge plan with Haley Tenorio of neurosurgery and the patient.    Consultants/Specialty  neurosurgery    Code Status  Full Code    Disposition  Patient is not medically cleared for discharge.   Anticipate discharge to to an inpatient rehabilitation hospital versus  skilled nursing facility.  I have placed the appropriate orders for post-discharge needs.    Review of Systems  Review of Systems   Constitutional:  Negative for chills and fever.   Respiratory:  Negative for cough and shortness of breath.    Cardiovascular:  Negative for chest pain and palpitations.   Gastrointestinal:  Negative for abdominal pain, nausea and vomiting.   Genitourinary:  Negative for dysuria and hematuria.   Musculoskeletal:  Negative for joint pain and myalgias.   Neurological:  Negative for dizziness and headaches.   Psychiatric/Behavioral:  The patient is not nervous/anxious and does not have insomnia.       Physical Exam  Temp:  [36.8 °C (98.2 °F)-37.3 °C (99.1 °F)] 36.8 °C (98.2 °F)  Pulse:  [59-72] 59  Resp:  [16-18] 16  BP: (111-149)/(54-81) 125/67  SpO2:  [93 %-99 %] 93 %    Physical Exam  Vitals and nursing note reviewed.   Constitutional:       General: He is not in acute distress.     Appearance: Normal appearance. He is not ill-appearing.   HENT:      Head: Normocephalic and atraumatic.      Comments: Craniotomy incision clean dry and intact     Mouth/Throat:      Mouth: Mucous membranes are moist.      Pharynx: Oropharynx is clear. No oropharyngeal exudate.   Eyes:      General: No scleral icterus.        Right eye: No discharge.         Left eye: No discharge.      Conjunctiva/sclera: Conjunctivae normal.   Cardiovascular:      Rate and Rhythm: Normal rate and regular rhythm.      Pulses: Normal pulses.      Heart sounds: Normal heart sounds. No murmur heard.  Pulmonary:      Effort: Pulmonary effort is normal. No respiratory distress.      Breath sounds: Normal breath sounds.   Abdominal:      General: Abdomen is flat. Bowel sounds are normal. There is no distension.      Palpations: Abdomen is soft.   Musculoskeletal:         General: No swelling.      Cervical back: Neck supple. No tenderness.      Right lower leg: No edema.      Left lower leg: No edema.   Skin:     General: Skin  is warm and dry.      Coloration: Skin is not pale.   Neurological:      Mental Status: He is alert and oriented to person, place, and time. Mental status is at baseline.      Motor: Weakness (Diffuse weakness in all 4 extremities) present.      Comments: Alert and orient x3.  There is no drift.   Psychiatric:         Thought Content: Thought content normal.         Judgment: Judgment normal.       Fluids    Intake/Output Summary (Last 24 hours) at 12/11/2022 1149  Last data filed at 12/11/2022 1000  Gross per 24 hour   Intake 353 ml   Output 1850 ml   Net -1497 ml       Laboratory  Recent Labs     12/09/22  0605 12/10/22  0512 12/11/22  0345   WBC 18.5* 14.3* 17.3*   RBC 3.11* 3.06* 3.06*   HEMOGLOBIN 9.5* 9.5* 9.4*   HEMATOCRIT 27.0* 26.7* 26.8*   MCV 86.8 87.3 87.6   MCH 30.5 31.0 30.7   MCHC 35.2 35.6* 35.1   RDW 46.7 45.1 44.5   PLATELETCT 67* 76* 100*   MPV 11.2 11.2 10.7     Recent Labs     12/09/22  0605 12/10/22  0512 12/11/22  0345   SODIUM 137 142 137   POTASSIUM 4.1 3.8 3.4*   CHLORIDE 107 104 99   CO2 24 28 28   GLUCOSE 174* 171* 159*   BUN 15 15 15   CREATININE 0.56 0.58 0.62   CALCIUM 8.2* 8.5 8.5                     Imaging  MR-BRAIN-WITH & W/O   Final Result      1.  Postoperative bilateral parietal craniectomy and cranioplasty with gross total resection of a large bilateral posterior parietal meningioma. Expected postoperative changes with some hemorrhage along the posterior falx and left parietal and occipital    lobes at the operative site. Some surrounding cytotoxic edema in the brain parenchyma with a globoid area of bright signal in the right lateral occipital-parietal lobe which may represent an area of postoperative infarct. Minimal residual enhancement    along the falx.   2.  Small left hemisphere subdural hygroma.      EC-ECHOCARDIOGRAM COMPLETE W/O CONT   Final Result      DX-CHEST-PORTABLE (1 VIEW)   Final Result         1.  Interstitial pulmonary parenchymal prominence, compatible with  interstitial edema and/or infiltrates, slightly more pronounced compared to prior study.   2.  Cardiomegaly      DX-CHEST-PORTABLE (1 VIEW)   Final Result         1.  Interstitial pulmonary parenchymal prominence, compatible with interstitial edema and/or infiltrates.      CT-HEAD W/O   Final Result         1.  Streaky hyperdense hemorrhage and pneumocephalus within occipital surgical bed, within expected limits for recent postop changes.   2.  Atherosclerosis.         DX-CHEST-PORTABLE (1 VIEW)   Final Result         1.  No acute cardiopulmonary disease.      DX-CHEST-PORTABLE (1 VIEW)   Final Result      No acute cardiopulmonary abnormality.      IR-EMBOLIZE-NEURO-INTRACRANIAL   Final Result   Impression:      57-year-old patient with a large bilateral midline parieto-occipital meningioma underwent cerebral angiography and embolization as described above.      Tumor blush with feeders from the right middle meningeal, left middle meningeal, right occipital artery were embolized to occlusion using a combination of particles and Gelfoam.      Additional leptomeningeal feeders to the meningioma arise from left MCA, DENISSE, PCA.      A small arterial venous shunt is also noted within the lesion arising from small branches of the left posterior cerebral artery.      Occlusion of the posterior segment of the superior sagittal sinus is demonstrated due to invasion by the tumor. Multiple collateral veins have developed around the occluded segment with predominant anterior drainage into the superior anastomotic veins.      IAdelina was physically present and participated during the entire procedure of the IR-EMBOLIZE-NEURO-INTRACRANIAL.      MR-STEALTH BRAIN WITH & W/O   Final Result      There is an approximately 6.7 x 4.7 x 5.9 cm sized enhancing extra-axial mass noted in the bilateral mid posterior parietal region along the both sides of posterior falx cerebri. The superior sagittal sinus is infiltrated and  surrounded by this tumor.    There is also abnormal adjacent dural enhancement .The adjacent parietal/occipital bones are infiltrated by this tumor. There is also hyperostosis of the calvarial surface of the bones with enhancement. There are multiple enlarged blood vessels are noted    along the anterior margin of the lesion. There is diffuse white matter edema noted in the bilateral parietal and occipital lobes surrounding the lesion.There is an approximately 6 mm midline shift towards right side. 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 The other differential diagnosis includes hemangiopericytoma and dural based metastasis. Findings were discussed with SUSY PRATT on 12/1/2022 7:43 AM.      MR-BRAIN-WITH & W/O   Final Result      There is an approximately 6.7 x 4.7 x 5.9 cm sized enhancing extra-axial mass noted in the bilateral mid posterior parietal region along the both sides of posterior falx cerebri. The superior sagittal sinus is infiltrated and surrounded by this tumor.    There is also abnormal adjacent dural enhancement .The adjacent parietal/occipital bones are infiltrated by this tumor. There is also hyperostosis of the calvarial surface of the bones with enhancement. There are multiple enlarged blood vessels are noted    along the anterior margin of the lesion. There is diffuse white matter edema noted in the bilateral parietal and occipital lobes surrounding the lesion.There is an approximately 6 mm midline shift towards right side. 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 The other differential diagnosis includes hemangiopericytoma and dural based metastasis. Findings were discussed with SUSY PRATT on 12/1/2022 7:43 AM.      CT-CHEST,ABDOMEN,PELVIS WITH   Final Result         1.  No significant abnormality in thorax, abdomen  and pelvis CT scan.   2.  Hepatomegaly   3.  Cholelithiasis   4.  Segment 2 duodenal diverticula   5.  Atherosclerosis   6.  Fat-containing left inguinal hernia   7.  Fat-containing umbilical hernia   8.  Diverticulosis   9.  Main pulmonary artery dilatation, appearance suggests changes related to pulmonary artery aneurysm or pulmonary hypertension.      NM-CARDIAC STRESS TEST    (Results Pending)          Assessment/Plan       * Meningioma, cerebral (HCC)- (present on admission)  Assessment & Plan  As per pathology.  Status post resection 12/7/2022.    Postoperative cerebrovascular infarction following non-cardiac surgery (HCC)- (present on admission)  Assessment & Plan  12/11/2022:  MRI of the brain showing right PCA stroke following resection of the meningioma.  No need for aspirin at this time.    Acute ischemic right PCA stroke (HCC)- (present on admission)  Assessment & Plan  12/11/2022:  MRI showing perioperative right PCA stroke.  Likely cause of vision deficit.    Fall precautions  PT and OT consults    Cerebral edema (HCC)- (present on admission)  Assessment & Plan  Associated with brain compression secondary to meningioma.  Patient also has postoperative cerebral edema.    12/11/2022  Continue dexamethasone taper as per neurosurgery    Brain compression (HCC)- (present on admission)  Assessment & Plan  Secondary to large meningioma.  Significant brain compression due to the meningioma.  Associated with vasogenic edema.    Status post resection 12/7/2022.    12/11/2022:  PT and OT recommending postacute placement.  Speech therapy recommending speech therapy 3 times a week.  Awaiting physiatry consultation. I have placed referral for skilled nursing facilities.    T wave inversion in EKG  Assessment & Plan  12/10/2022:  New T wave inversions without any chest pain.  Troponin initially negative.  Plan will be to check serial troponins and continue telemetry monitoring.  Serial EKGs as needed for chest pain.   If troponins become positive, we will have to consider possibility of NSTEMI and consult cardiology.  If troponins are negative, recommend proceeding with MPI stress test.  I have ordered an echocardiogram.     12/11/2022:  Stable T wave inversion.  Troponin negative x3.  Echocardiogram EF 65%, no regional wall abnormalities.  No valvular normalities.  Large right ventricular size.  Pending MPI stress test.  Patient had coffee this morning so cannot undergo stress test today.  Planning for tomorrow morning.    Hypokalemia  Assessment & Plan  12/11/2022:   Potassium low at 3.4 today.  Replaced with potassium chloride 40 mg x 2 for goal greater than 4.0.    Brain mass- (present on admission)  Assessment & Plan  Status post resection of meningioma 12/17/2022.  Management as per neurosurgery.    Leukocytosis- (present on admission)  Assessment & Plan  Likely secondary to steroids.  No signs of infection at this time.    SIRS (systemic inflammatory response syndrome) (HCC)- (present on admission)  Assessment & Plan  SIRS criteria identified on my evaluation include:  Fever, with temperature greater than 101 deg F, Tachycardia, with heart rate greater than 90 BPM, Tachypnea, with respirations greater than 20 per minute and Leukocytosis, with WBC greater than 12,000  SIRS is non-infectious, the patient does not have sepsis    Resolved in the ICU.  Likely perioperative.      Lactic acidosis  Assessment & Plan  Patient had lactic acidosis in the ICU.    Resolved.    On mechanically assisted ventilation (HCC)- (present on admission)  Assessment & Plan  Patient was intubated for surgery and postoperatively.  He was extubated on 12/8/2022 and transferred out of the ICU.    Hyponatremia- (present on admission)  Assessment & Plan  Resolved    Hyperglycemia- (present on admission)  Assessment & Plan  A1c 6.4%    12/10/2022:  Blood glucose worsening into the 200s secondary to dexamethasone.  Start glargine insulin 5 units  daily  Continue lispro insulin sliding scale with hypoglycemia protocol    12/11/2022  Blood glucose control improving to 150s to 190s.  Continue glargine 5 units daily with lispro SSI scale and hypoglycemia protocol.  Regular diet changed to carbohydrate consistent diet.    Hypertension- (present on admission)  Assessment & Plan  12/10/2022:  Blood pressures controlled.  Goal of less than 130/80.  Resume home losartan at lower dose of 50 mg daily.    12/11/2022:  Blood pressures well controlled.  Continuing on losartan 50 mg daily.      I have performed a physical exam and reviewed and updated ROS and Plan today (12/11/2022). In review of yesterday's note (12/10/2022), there are no changes except as documented above.    VTE prophylaxis:     SCDs/TEDs

## 2022-12-11 NOTE — ASSESSMENT & PLAN NOTE
Patient was intubated for surgery and postoperatively.  He was extubated on 12/8/2022 and transferred out of the ICU.

## 2022-12-11 NOTE — ASSESSMENT & PLAN NOTE
SIRS criteria identified on my evaluation include:  Fever, with temperature greater than 101 deg F, Tachycardia, with heart rate greater than 90 BPM, Tachypnea, with respirations greater than 20 per minute and Leukocytosis, with WBC greater than 12,000  SIRS is non-infectious, the patient does not have sepsis    Resolved in the ICU.  Likely perioperative.

## 2022-12-11 NOTE — ASSESSMENT & PLAN NOTE
Secondary to large meningioma.  Significant brain compression due to the meningioma.  Associated with vasogenic edema.    Status post resection 12/7/2022.    12/11/2022:  PT and OT recommending postacute placement.  Speech therapy recommending speech therapy 3 times a week.  Awaiting physiatry consultation. I have placed referral for skilled nursing facilities.

## 2022-12-11 NOTE — ASSESSMENT & PLAN NOTE
Likely secondary to steroids.  No signs of infection at this time.  Infectious work-up and prelim antibiotics as WNC up to 23K  Trended down slightly to 21K procalc 0.18. Urine and CXR unremarkable for infx.  Trend WBCs for now.

## 2022-12-11 NOTE — ASSESSMENT & PLAN NOTE
12/11/2022:   Potassium low at 3.4 today.  Replaced with potassium chloride 40 mg x 2 for goal greater than 4.0.

## 2022-12-11 NOTE — CONSULTS
Physical Medicine and Rehabilitation Consultation              Date of initial consultation: 12/11/2022  Consulting provider: Haley Tenorio P.A.-C.  Reason for consultation: assess for acute inpatient rehab appropriateness  LOS: 11 Day(s)    Chief complaint: Visual disturbance    HPI: The patient is a 57 y.o. right hand dominant male with a past medical history of hypertension;  who presented on 11/30/2022  7:58 PM as a transfer from an outside hospital with CT showing brain mass.  Patient complained of headaches, visual obscuration.  Patient had MRI brain at [facility] that showed a very large heterogeneous mass with hyperostosis consistent with aggressive meningioma.  Patient underwent angiogram which found large feeders to the tumor from the right MMA and left MMA, which were embolized.  Patient then underwent bilateral parietal occipital craniectomy by Dr. Dieudonne Donis on 12/7.  Pathology returned meningioma.  Postoperatively, patient has visual disturbance.  MR brain on 12/10 shows postop infarct in the right lateral occipital parietal lobe.    The patient currently reports preserved vision left lower quadrant, otherwise fairly poor vision.  Patient is able to see shapes and colors.  No dysarthria, denies numbness and tingling.    ROS  Pertinent positives are mentioned in the HPI, all others reviewed and are negative.    Social Hx:  1 SH  0 OLIVIER  With: Brother and sister    THERAPY:  Restrictions: Fall risk  PT: Functional mobility   12/9: Unable to participate in gait, standby assist for sit to stand    OT: ADLs  12/9: Min assist lower body dressing    SLP:   12/9: Moderate deficits in attention and reasoning, severe deficits with memory    IMAGING:  MR brain 12/10/2022  1.  Postoperative bilateral parietal craniectomy and cranioplasty with gross total resection of a large bilateral posterior parietal meningioma. Expected postoperative changes with some hemorrhage along the  posterior falx and left parietal and occipital lobes at the operative site. Some surrounding cytotoxic edema in the brain parenchyma with a globoid area of bright signal in the right lateral occipital-parietal lobe which may represent an area of postoperative infarct. Minimal residual enhancement along the falx.  2.  Small left hemisphere subdural hygroma.    MR brain 12/1/2022  There is an approximately 6.7 x 4.7 x 5.9 cm sized enhancing extra-axial mass noted in the bilateral mid posterior parietal region along the both sides of posterior falx cerebri. The superior sagittal sinus is infiltrated and surrounded by this tumor.   There is also abnormal adjacent dural enhancement .The adjacent parietal/occipital bones are infiltrated by this tumor. There is also hyperostosis of the calvarial surface of the bones with enhancement. There are multiple enlarged blood vessels are noted   along the anterior margin of the lesion. There is diffuse white matter edema noted in the bilateral parietal and occipital lobes surrounding the lesion.There is an approximately 6 mm midline shift towards right side. Findings likely represent   infiltrating vascular high-grade meningioma with superior sagittal sinus and bone infiltration. The other differential diagnosis includes hemangiopericytoma and dural based metastasis. Findings were discussed with SUSY PRATT on 12/1/2022 7:43 AM.    PROCEDURES:  Susy Pratt MD 12/ 7/2022  Bilateral parietal occipital craniectomy    Adelina Mcdonald M.D.  Cerebral angiogram and tumor embolization    PMH:  Past Medical History:   Diagnosis Date    Hypertension     Known health problems: none        PSH:  Past Surgical History:   Procedure Laterality Date    CRANIOTOMY STEALTH Bilateral 12/7/2022    Procedure: STEALTH BILATERAL PARIETAL OCCIPITAL CRANIECTOMY FOR RESECTION OF BRAIN LESION AND MESH CRANIOPLASTY USING NEUROMONITORING;  Surgeon: Susy Pratt M.D.;  Location: SURGERY [facility]  [facility];  Service: Neurosurgery       FHX:  Non-pertinent to today's issues    Medications:  Current Facility-Administered Medications   Medication Dose    dexamethasone (DECADRON) injection 6 mg  6 mg    Followed by    [START ON 12/12/2022] dexamethasone (DECADRON) injection 4 mg  4 mg    Followed by    [START ON 12/13/2022] dexamethasone (DECADRON) injection 2 mg  2 mg    Followed by    [START ON 12/15/2022] dexamethasone (DECADRON) injection 2 mg  2 mg    insulin GLARGINE (Lantus,Semglee) injection  5 Units    losartan (COZAAR) tablet 50 mg  50 mg    artificial tears (EYE LUBRICANT) ophth ointment 1 Application  1 Application    insulin regular (HumuLIN R,NovoLIN R) injection  2-9 Units    And    dextrose 10 % BOLUS 25 g  25 g    levETIRAcetam (KEPPRA) tablet 500 mg  500 mg    Pharmacy Consult: pharmacy to discontinue all other orders for acetaminophen      acetaminophen (TYLENOL) tablet 1,000 mg  1,000 mg    cefTRIAXone (Rocephin) syringe 2,000 mg  2,000 mg    Pharmacy Consult Request ...Pain Management Review 1 Each  1 Each    MD ALERT...DO NOT ADMINISTER NSAIDS or ASPIRIN unless ORDERED By Neurosurgery 1 Each  1 Each    ondansetron (ZOFRAN) syringe/vial injection 4 mg  4 mg    diphenhydrAMINE (BENADRYL) injection 25 mg  25 mg    scopolamine (TRANSDERM-SCOP) patch 1 Patch  1 Patch    docusate sodium (COLACE) capsule 100 mg  100 mg    senna-docusate (PERICOLACE or SENOKOT S) 8.6-50 MG per tablet 1 Tablet  1 Tablet    senna-docusate (PERICOLACE or SENOKOT S) 8.6-50 MG per tablet 1 Tablet  1 Tablet    polyethylene glycol/lytes (MIRALAX) PACKET 1 Packet  1 Packet    magnesium hydroxide (MILK OF MAGNESIA) suspension 30 mL  30 mL    bisacodyl (DULCOLAX) suppository 10 mg  10 mg    sodium phosphate (Fleet) enema 133 mL  1 Each    oxyCODONE immediate-release (ROXICODONE) tablet 5 mg  5 mg    Or    oxyCODONE immediate release (ROXICODONE) tablet 10 mg  10 mg    Or    HYDROmorphone (Dilaudid) injection 0.5 mg  0.5 mg     "diphenhydrAMINE (BENADRYL) tablet/capsule 25 mg  25 mg    Or    diphenhydrAMINE (BENADRYL) injection 25 mg  25 mg    benzocaine-menthol (Cepacol) lozenge 1 Lozenge  1 Lozenge    Respiratory Therapy Consult      ondansetron (ZOFRAN ODT) dispertab 4 mg  4 mg    promethazine (PHENERGAN) tablet 12.5-25 mg  12.5-25 mg    promethazine (PHENERGAN) suppository 12.5-25 mg  12.5-25 mg    prochlorperazine (COMPAZINE) injection 5-10 mg  5-10 mg       Allergies:  No Known Allergies      Physical Exam:  Vitals: /66   Pulse 67   Temp 37.1 °C (98.8 °F) (Temporal)   Resp 18   Ht 1.778 m (5' 10\")   Wt 90.8 kg (200 lb 2.8 oz)   SpO2 99%   Gen: NAD  Head: Large coronal posterior head incision, closed with staples, open to air, nondraining  Eyes/ Nose/ Mouth: moist mucous membranes.  Poor vision  Cardio: RRR, good distal perfusion, warm extremities  Pulm: normal respiratory effort, no cyanosis   Abd: Soft NTND, negative borborygmi   Ext: No peripheral edema. No calf tenderness. No clubbing.    Mental status: answers questions appropriately follows commands  Speech: fluent, no aphasia or dysarthria    Motor:      Upper Extremity  Myotome R L   Shoulder flexion C5 5 5   Elbow flexion C5 5 5   Wrist extension C6 5 5   Elbow extension C7 5 5   Finger flexion C8 5 5   Finger abduction T1 5 5     Lower Extremity Myotome R L   Hip flexion L2 5 5   Knee extension L3 5 5   Ankle dorsiflexion L4 5 5   Toe extension L5 5 5   Ankle plantarflexion S1 5 5     Labs: Reviewed and significant for   Recent Labs     12/09/22  0605 12/10/22  0512 12/11/22  0345   RBC 3.11* 3.06* 3.06*   HEMOGLOBIN 9.5* 9.5* 9.4*   HEMATOCRIT 27.0* 26.7* 26.8*   PLATELETCT 67* 76* 100*     Recent Labs     12/09/22  0605 12/10/22  0512 12/11/22  0345   SODIUM 137 142 137   POTASSIUM 4.1 3.8 3.4*   CHLORIDE 107 104 99   CO2 24 28 28   GLUCOSE 174* 171* 159*   BUN 15 15 15   CREATININE 0.56 0.58 0.62   CALCIUM 8.2* 8.5 8.5     Recent Results (from the past 24 " hour(s))   EKG    Collection Time: 12/10/22 10:58 AM   Result Value Ref Range    Report       [facility]    Test Date:  2022-12-10  Pt Name:    YIFAN LANCASTER              Department: [facility]  MRN:        [id number]                      Room:       S180  Gender:     Male                         Technician: MERRITT  :        1965                   Requested By:SUSY PRATT  Order #:    [id number]                    Reading MD:    Measurements  Intervals                                Axis  Rate:       80                           P:          -17  RI:         157                          QRS:        71  QRSD:       101                          T:          3  QT:         373  QTc:        431    Interpretive Statements  Sinus rhythm  Abnormal T, consider ischemia, anterior leads  Artifact in lead(s) II,III,aVR,aVL,aVF,V1,V2,V3,V4,V5,V6 and baseline wander  in lead(s) V1,V2,V4  Compared to ECG 2022 22:15:48  T-wave abnormality now present  Possible ischemia now present  Sinus tachycardia no longer present     POCT glucose device results    Collection Time: 12/10/22 11:48 AM   Result Value Ref Range    POC Glucose, Blood 216 (H) 65 - 99 mg/dL   TROPONIN    Collection Time: 12/10/22 11:59 AM   Result Value Ref Range    Troponin T 14 6 - 19 ng/L   EKG    Collection Time: 12/10/22  1:44 PM   Result Value Ref Range    Report       [facility]    Test Date:  2022-12-10  Pt Name:    YIFAN LANCASTER              Department: [facility]  MRN:        [id number]                      Room:       80  Gender:     Male                         Technician: TXM  :        1965                   Requested By:SUSY PRATT  Order #:    [id number]                    Reading MD:    Measurements  Intervals                                Axis  Rate:       82                           P:          44  RI:         165                          QRS:        40  QRSD:       89                           T:          1  QT:          368  QTc:        430    Interpretive Statements  SINUS RHYTHM  ABNORMAL R-WAVE PROGRESSION, EARLY TRANSITION  REPOL ABNRM SUGGESTS ISCHEMIA, DIFFUSE LEADS  ST ELEVATION, CONSIDER INFERIOR INJURY  ARTIFACT IN LEAD(S) I,II,III,aVR,aVF,V1,V2,V3,V4,V5,V6  Compared to ECG 12/10/2022 10:58:30  Early repolarization now present  ST (T wave) deviation now present  Myocardial infarct finding now present  T-wave abnor mality no longer present  Possible ischemia still present     TROPONIN    Collection Time: 12/10/22  4:06 PM   Result Value Ref Range    Troponin T 13 6 - 19 ng/L   POCT glucose device results    Collection Time: 12/10/22  5:00 PM   Result Value Ref Range    POC Glucose, Blood 159 (H) 65 - 99 mg/dL   EKG    Collection Time: 12/10/22  6:38 PM   Result Value Ref Range    Report       [facility]    Test Date:  2022-12-10  Pt Name:    YIFAN LANCASTER              Department: [facility]  MRN:        [id number]                      Room:       [facility]  Gender:     Male                         Technician: ANSHU  :        1965                   Requested By:ONEAL STERLING  Order #:    [id number]                    Reading MD:    Measurements  Intervals                                Axis  Rate:       74                           P:          13  UT:         158                          QRS:        65  QRSD:       288                          T:          55  QT:         414  QTc:        460    Interpretive Statements  SINUS RHYTHM  CONSIDER RIGHT ATRIAL ENLARGEMENT  PROBABLE LEFT VENTRICULAR HYPERTROPHY  INFERIOR INFARCT, ACUTE (LCX)  LATERAL LEADS ARE ALSO INVOLVED  ST DEPRESSION V1-V3, SUGGEST RECORDING POSTERIOR LEADS  ARTIFACT IN LEAD(S) I,II,III,aVR,aVL,aVF,V1,V2,V3,V4,V5,V6  Compared to ECG 12/10/2022 13:44:33  Early repolarization no longer present  Possible ischemia n o longer present  Myocardial infarct finding still present  ST (T wave) deviation still present     TROPONIN    Collection Time: 12/10/22  8:00 PM    Result Value Ref Range    Troponin T 12 6 - 19 ng/L   EC-ECHOCARDIOGRAM COMPLETE W/O CONT    Collection Time: 12/10/22  8:23 PM   Result Value Ref Range    Eject.Frac. MOD BP 63.52     Eject.Frac. MOD 4C 62.93     Eject.Frac. MOD 2C 63.87     Left Ventrical Ejection Fraction 65    POCT glucose device results    Collection Time: 12/10/22  8:39 PM   Result Value Ref Range    POC Glucose, Blood 193 (H) 65 - 99 mg/dL   TROPONIN    Collection Time: 12/10/22 11:44 PM   Result Value Ref Range    Troponin T 12 6 - 19 ng/L   CBC WITHOUT DIFFERENTIAL    Collection Time: 12/11/22  3:45 AM   Result Value Ref Range    WBC 17.3 (H) 4.8 - 10.8 K/uL    RBC 3.06 (L) 4.70 - 6.10 M/uL    Hemoglobin 9.4 (L) 14.0 - 18.0 g/dL    Hematocrit 26.8 (L) 42.0 - 52.0 %    MCV 87.6 81.4 - 97.8 fL    MCH 30.7 27.0 - 33.0 pg    MCHC 35.1 33.7 - 35.3 g/dL    RDW 44.5 35.9 - 50.0 fL    Platelet Count 100 (L) 164 - 446 K/uL    MPV 10.7 9.0 - 12.9 fL   Comp Metabolic Panel    Collection Time: 12/11/22  3:45 AM   Result Value Ref Range    Sodium 137 135 - 145 mmol/L    Potassium 3.4 (L) 3.6 - 5.5 mmol/L    Chloride 99 96 - 112 mmol/L    Co2 28 20 - 33 mmol/L    Anion Gap 10.0 7.0 - 16.0    Glucose 159 (H) 65 - 99 mg/dL    Bun 15 8 - 22 mg/dL    Creatinine 0.62 0.50 - 1.40 mg/dL    Calcium 8.5 8.5 - 10.5 mg/dL    AST(SGOT) 19 12 - 45 U/L    ALT(SGPT) 21 2 - 50 U/L    Alkaline Phosphatase 52 30 - 99 U/L    Total Bilirubin 0.6 0.1 - 1.5 mg/dL    Albumin 3.0 (L) 3.2 - 4.9 g/dL    Total Protein 4.7 (L) 6.0 - 8.2 g/dL    Globulin 1.7 (L) 1.9 - 3.5 g/dL    A-G Ratio 1.8 g/dL   MAGNESIUM    Collection Time: 12/11/22  3:45 AM   Result Value Ref Range    Magnesium 2.0 1.5 - 2.5 mg/dL   PHOSPHORUS    Collection Time: 12/11/22  3:45 AM   Result Value Ref Range    Phosphorus 3.6 2.5 - 4.5 mg/dL   TROPONIN    Collection Time: 12/11/22  3:45 AM   Result Value Ref Range    Troponin T 13 6 - 19 ng/L   ESTIMATED GFR    Collection Time: 12/11/22  3:45 AM   Result Value  Ref Range    GFR (CKD-EPI) 111 >60 mL/min/1.73 m 2         ASSESSMENT:  Patient is a 57 y.o. male admitted with posterior fossa brain mass now s/p embolization and resection    [facility] Code / Diagnosis to Support: 0017.2 - Medically Complex: Neoplasms    Rehabilitation: Impaired ADLs and mobility  Patient is a good candidate for inpatient rehab based on needs for PT, OT.  Patient will also benefit from family training.  Patient has a good discharge situation which will be home with family.     [employer] insurance is a barrier to [facility], he will need to be considered by [facility]    All cases are subject to administrative review and recommendations may change    Disposition recommendations:  -Patient would be a candidate for IPR at [facility] or SNF in [place].  Patient has very poor vision, but full strength throughout his body, therefore is at high risk for falls.    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 Dieudonne Donis MD  -Seizure prophylaxis with Keppra  -Close neurosurgical follow-up  -Continue PT OT while in-house  -Patient should be using a walker for ambulation due to decreased vision  -Follow-up with Dr. Andrew WHITAKER outpatient physiatrist    DVT PPX: SCDs      Thank you for allowing us to participate in the care of this patient.     Patient was seen for 83 minutes on unit/floor of which > 50% of time was spent on counseling and coordination of care regarding the above, including prognosis, risk reduction, benefits of treatment, and options for next stage of care.    Jamal Blake, DO   Physical Medicine and Rehabilitation      Please note that this dictation was created using voice recognition software. I have made every reasonable attempt to correct obvious errors, but there may be errors of grammar and possibly content that I did not discover before finalizing the note.

## 2022-12-11 NOTE — CARE PLAN
"  Problem: Neuro Status  Goal: Neuro status will remain stable or improve  Outcome: Progressing  Note: Patient completed MRI. Q4 neuro checks.      Problem: Bowel Elimination  Goal: Establish and maintain regular bowel function  Outcome: Progressing  Note: Miralax given PRN. No BM during NOC shift. Patient states he feels \"bloated\". Encouraged mobility to the patient; verbalizes understanding.   The patient is Watcher - Medium risk of patient condition declining or worsening    Shift Goals  Clinical Goals: Stable neuro and cardiac  Patient Goals: Rest  Family Goals: elizabeth    Progress made toward(s) clinical / shift goals:  see notes for progress    Patient is not progressing towards the following goals:      "

## 2022-12-11 NOTE — ASSESSMENT & PLAN NOTE
12/11/2022:  MRI showing perioperative right PCA stroke.  Likely cause of vision deficit.    Fall precautions  PT and OT consults

## 2022-12-11 NOTE — CARE PLAN
The patient is Stable - Low risk of patient condition declining or worsening    Shift Goals  Clinical Goals: monitor neuro and cardiac status  Patient Goals: comfort  Family Goals: elizabeth    Progress made toward(s) clinical / shift goals:     Problem: Pain - Standard  Goal: Alleviation of pain or a reduction in pain to the patient’s comfort goal  Outcome: Progressing     Problem: Fall Risk  Goal: Patient will remain free from falls  Outcome: Progressing     Problem: Neuro Status  Goal: Neuro status will remain stable or improve  Outcome: Progressing     Problem: Seizure Precautions  Goal: Implementation of seizure precautions  Outcome: Progressing       Patient is not progressing towards the following goals:

## 2022-12-11 NOTE — PROGRESS NOTES
Update for patient's EKG made to on-call hospitalist, Bal Dunne. Suggestion to continue to trend tropes and update if tropes elevate, vitals become unstable, or patient c/o chest pain. Currently patient VSS, denies SOB and chest pain, currently resting in bed.

## 2022-12-11 NOTE — PROGRESS NOTES
Neurosurgery Progress Note    Subjective:  Episode of t wave abnormality yesterday, IM team re-consulted   No acute events overnight, no CP, SOB, pain    Exam:  Sleeping but wakes to voice. A&O, speech fluent  EOM intact, facial symmetry  FS x4  Staples CDI      BP  Min: 111/54  Max: 149/81  Pulse  Av.6  Min: 59  Max: 121  Resp  Av.6  Min: 16  Max: 18  Temp  Av.1 °C (98.7 °F)  Min: 36.8 °C (98.2 °F)  Max: 37.3 °C (99.1 °F)  Monitored Temp 2  Av °C (98.6 °F)  Min: 36.8 °C (98.2 °F)  Max: 37.2 °C (99 °F)  SpO2  Av.4 %  Min: 93 %  Max: 99 %    No data recorded    Recent Labs     22  0605 12/10/22  0512 22  0345   WBC 18.5* 14.3* 17.3*   RBC 3.11* 3.06* 3.06*   HEMOGLOBIN 9.5* 9.5* 9.4*   HEMATOCRIT 27.0* 26.7* 26.8*   MCV 86.8 87.3 87.6   MCH 30.5 31.0 30.7   MCHC 35.2 35.6* 35.1   RDW 46.7 45.1 44.5   PLATELETCT 67* 76* 100*   MPV 11.2 11.2 10.7       Recent Labs     22  0605 12/10/22  0522  0345   SODIUM 137 142 137   POTASSIUM 4.1 3.8 3.4*   CHLORIDE 107 104 99   CO2 24 28 28   GLUCOSE 174* 171* 159*   BUN 15 15 15   CREATININE 0.56 0.58 0.62   CALCIUM 8.2* 8.5 8.5                   Intake/Output                         12/10/22 0700 - 22 - 22 Total  Total                 Intake    P.O.  --  -- --  353  -- 353    P.O. -- -- -- 353 -- 353    Total Intake -- -- -- 353 -- 353       Output    Urine  --  1600  250  -- 250    Number of Times Voided -- 5 x 5 x 1 x -- 1 x    Urine Void (mL) -- 1600 250 -- 250    Emesis  --  -- --  --  -- --    Emesis - Number of Times -- 2 x 2 x -- -- --    Stool  --  -- --  --  -- --    Number of Times Stooled -- -- -- 1 x -- 1 x    Total Output -- 1600 250 -- 250       Net I/O     -- - - 103 -- 103              Intake/Output Summary (Last 24 hours) at 2022 1036  Last data filed at 2022 1000  Gross per 24 hour   Intake 353 ml    Output 1850 ml   Net -1497 ml               dexamethasone  6 mg Q12HRS    Followed by    [START ON 12/12/2022] dexamethasone  4 mg Q12HRS    Followed by    [START ON 12/13/2022] dexamethasone  2 mg Q12HRS    Followed by    [START ON 12/15/2022] dexamethasone  2 mg DAILY    insulin GLARGINE  5 Units Q EVENING    losartan  50 mg Q DAY    artificial tears  1 Application PRN    insulin regular  2-9 Units 4X/DAY ACHS    And    dextrose bolus  25 g Q15 MIN PRN    levETIRAcetam  500 mg BID    Pharmacy   PHARMACY TO DOSE    acetaminophen  1,000 mg Q6HRS    cefTRIAXone (ROCEPHIN) IV  2,000 mg Q12HRS    Pharmacy Consult Request  1 Each PHARMACY TO DOSE    MD ALERT...DO NOT ADMINISTER NSAIDS or ASPIRIN unless ORDERED By Neurosurgery  1 Each PRN    ondansetron  4 mg Q4HRS PRN    diphenhydrAMINE  25 mg Q6HRS PRN    scopolamine  1 Patch Q72HRS PRN    docusate sodium  100 mg BID    senna-docusate  1 Tablet Nightly    senna-docusate  1 Tablet Q24HRS PRN    polyethylene glycol/lytes  1 Packet BID PRN    magnesium hydroxide  30 mL QDAY PRN    bisacodyl  10 mg Q24HRS PRN    sodium phosphate  1 Each Once PRN    oxyCODONE immediate-release  5 mg Q3HRS PRN    Or    oxyCODONE immediate-release  10 mg Q3HRS PRN    Or    HYDROmorphone  0.5 mg Q3HRS PRN    diphenhydrAMINE  25 mg Q6HRS PRN    Or    diphenhydrAMINE  25 mg Q6HRS PRN    benzocaine-menthol  1 Lozenge Q2HRS PRN    Respiratory Therapy Consult   Continuous RT    ondansetron  4 mg Q4HRS PRN    promethazine  12.5-25 mg Q4HRS PRN    promethazine  12.5-25 mg Q4HRS PRN    prochlorperazine  5-10 mg Q4HRS PRN       Assessment and Plan:  Hospital day # 4  POD# 4 brain tumor, craniotomy for resection   Chemical prophylactic DVT therapy: yes  Start date/time: today if indicated    Ct head stable  Keppra 500 BID  Dex taper  PT/OT, recommending placement. PMR consult placed  Continue pain control, bowel regimen  MRI completed, area of likely postop infarct along surgical bed. Dr Donis  notified    Appreciate ongoing med mgmt per IM team, stress test tomorrow.

## 2022-12-11 NOTE — ASSESSMENT & PLAN NOTE
12/10/2022:  New T wave inversions without any chest pain.  Troponin initially negative.  Plan will be to check serial troponins and continue telemetry monitoring.  Serial EKGs as needed for chest pain.  If troponins become positive, we will have to consider possibility of NSTEMI and consult cardiology.  If troponins are negative, recommend proceeding with MPI stress test.  I have ordered an echocardiogram.     12/11/2022:  Stable T wave inversion.  Troponin negative x3.  Echocardiogram EF 65%, no regional wall abnormalities.  No valvular normalities.  Large right ventricular size.  Pending MPI stress test.  Patient had coffee this morning so cannot undergo stress test today.  Planning for tomorrow morning.    12/12/2022  Stress testing was negative.  No further intervention or work-up indicated at this time.

## 2022-12-11 NOTE — ASSESSMENT & PLAN NOTE
"Associated with brain compression secondary to meningioma.  Patient also has postoperative cerebral edema.    12/11/2022  Continue dexamethasone taper as per neurosurgery\"    Dexamethasone  "

## 2022-12-11 NOTE — PROGRESS NOTES
"Pt c/o abd pain, stated \"maybe too much gas\". Notified dr. Rodriguez. Orders received. Medications given per MAR. Pt resting in bed, NAD. Will cont to monitor  "

## 2022-12-11 NOTE — ASSESSMENT & PLAN NOTE
12/11/2022:  MRI of the brain showing right PCA stroke following resection of the meningioma.  No need for aspirin at this time.

## 2022-12-12 ENCOUNTER — APPOINTMENT (OUTPATIENT)
Dept: RADIOLOGY | Facility: MEDICAL CENTER | Age: 57
DRG: 025 | End: 2022-12-12
Attending: STUDENT IN AN ORGANIZED HEALTH CARE EDUCATION/TRAINING PROGRAM
Payer: COMMERCIAL

## 2022-12-12 ENCOUNTER — APPOINTMENT (OUTPATIENT)
Dept: RADIOLOGY | Facility: MEDICAL CENTER | Age: 57
DRG: 025 | End: 2022-12-12
Attending: INTERNAL MEDICINE
Payer: COMMERCIAL

## 2022-12-12 PROBLEM — G93.5 BRAIN COMPRESSION (HCC): Status: RESOLVED | Noted: 2022-12-10 | Resolved: 2022-12-12

## 2022-12-12 PROBLEM — D32.0 MENINGIOMA, CEREBRAL (HCC): Status: RESOLVED | Noted: 2022-12-10 | Resolved: 2022-12-12

## 2022-12-12 PROBLEM — R10.9 ABDOMINAL PAIN: Status: ACTIVE | Noted: 2022-12-12

## 2022-12-12 PROBLEM — G93.89 BRAIN MASS: Status: RESOLVED | Noted: 2022-11-30 | Resolved: 2022-12-12

## 2022-12-12 PROBLEM — R65.10 SIRS (SYSTEMIC INFLAMMATORY RESPONSE SYNDROME) (HCC): Status: RESOLVED | Noted: 2022-12-08 | Resolved: 2022-12-12

## 2022-12-12 PROBLEM — E87.20 LACTIC ACID ACIDOSIS: Status: RESOLVED | Noted: 2022-12-08 | Resolved: 2022-12-12

## 2022-12-12 PROBLEM — T18.9XXA: Status: ACTIVE | Noted: 2022-12-12

## 2022-12-12 PROBLEM — E87.6 HYPOKALEMIA: Status: RESOLVED | Noted: 2022-12-11 | Resolved: 2022-12-12

## 2022-12-12 PROBLEM — I97.821: Status: RESOLVED | Noted: 2022-12-11 | Resolved: 2022-12-12

## 2022-12-12 PROBLEM — Z99.11 ON MECHANICALLY ASSISTED VENTILATION (HCC): Status: RESOLVED | Noted: 2022-12-07 | Resolved: 2022-12-12

## 2022-12-12 PROBLEM — D72.829 LEUKOCYTOSIS: Status: RESOLVED | Noted: 2022-12-11 | Resolved: 2022-12-12

## 2022-12-12 PROBLEM — R73.9 HYPERGLYCEMIA: Status: RESOLVED | Noted: 2022-11-30 | Resolved: 2022-12-12

## 2022-12-12 PROBLEM — R94.31 T WAVE INVERSION IN EKG: Status: RESOLVED | Noted: 2022-12-10 | Resolved: 2022-12-12

## 2022-12-12 PROBLEM — E87.1 HYPONATREMIA: Status: RESOLVED | Noted: 2022-11-30 | Resolved: 2022-12-12

## 2022-12-12 LAB
ALBUMIN SERPL BCP-MCNC: 3.2 G/DL (ref 3.2–4.9)
ALBUMIN/GLOB SERPL: 1.8 G/DL
ALP SERPL-CCNC: 60 U/L (ref 30–99)
ALT SERPL-CCNC: 23 U/L (ref 2–50)
ANION GAP SERPL CALC-SCNC: 10 MMOL/L (ref 7–16)
AST SERPL-CCNC: 18 U/L (ref 12–45)
BILIRUB SERPL-MCNC: 0.6 MG/DL (ref 0.1–1.5)
BUN SERPL-MCNC: 17 MG/DL (ref 8–22)
CALCIUM SERPL-MCNC: 8.5 MG/DL (ref 8.5–10.5)
CHLORIDE SERPL-SCNC: 97 MMOL/L (ref 96–112)
CO2 SERPL-SCNC: 28 MMOL/L (ref 20–33)
CREAT SERPL-MCNC: 0.66 MG/DL (ref 0.5–1.4)
EKG IMPRESSION: NORMAL
EKG IMPRESSION: NORMAL
ERYTHROCYTE [DISTWIDTH] IN BLOOD BY AUTOMATED COUNT: 44.6 FL (ref 35.9–50)
GFR SERPLBLD CREATININE-BSD FMLA CKD-EPI: 109 ML/MIN/1.73 M 2
GLOBULIN SER CALC-MCNC: 1.8 G/DL (ref 1.9–3.5)
GLUCOSE BLD STRIP.AUTO-MCNC: 157 MG/DL (ref 65–99)
GLUCOSE BLD STRIP.AUTO-MCNC: 170 MG/DL (ref 65–99)
GLUCOSE BLD STRIP.AUTO-MCNC: 193 MG/DL (ref 65–99)
GLUCOSE SERPL-MCNC: 157 MG/DL (ref 65–99)
HCT VFR BLD AUTO: 29.8 % (ref 42–52)
HGB BLD-MCNC: 10.3 G/DL (ref 14–18)
MAGNESIUM SERPL-MCNC: 2.2 MG/DL (ref 1.5–2.5)
MCH RBC QN AUTO: 30.9 PG (ref 27–33)
MCHC RBC AUTO-ENTMCNC: 34.6 G/DL (ref 33.7–35.3)
MCV RBC AUTO: 89.5 FL (ref 81.4–97.8)
PHOSPHATE SERPL-MCNC: 2.9 MG/DL (ref 2.5–4.5)
PLATELET # BLD AUTO: 145 K/UL (ref 164–446)
PMV BLD AUTO: 10.2 FL (ref 9–12.9)
POTASSIUM SERPL-SCNC: 4.1 MMOL/L (ref 3.6–5.5)
PROT SERPL-MCNC: 5 G/DL (ref 6–8.2)
RBC # BLD AUTO: 3.33 M/UL (ref 4.7–6.1)
SODIUM SERPL-SCNC: 135 MMOL/L (ref 135–145)
TROPONIN T SERPL-MCNC: 13 NG/L (ref 6–19)
TROPONIN T SERPL-MCNC: 13 NG/L (ref 6–19)
TROPONIN T SERPL-MCNC: 14 NG/L (ref 6–19)
TROPONIN T SERPL-MCNC: 15 NG/L (ref 6–19)
TROPONIN T SERPL-MCNC: 15 NG/L (ref 6–19)
WBC # BLD AUTO: 24.5 K/UL (ref 4.8–10.8)

## 2022-12-12 PROCEDURE — 36415 COLL VENOUS BLD VENIPUNCTURE: CPT

## 2022-12-12 PROCEDURE — 83735 ASSAY OF MAGNESIUM: CPT

## 2022-12-12 PROCEDURE — A9270 NON-COVERED ITEM OR SERVICE: HCPCS | Performed by: NURSE PRACTITIONER

## 2022-12-12 PROCEDURE — 700102 HCHG RX REV CODE 250 W/ 637 OVERRIDE(OP): Performed by: INTERNAL MEDICINE

## 2022-12-12 PROCEDURE — 85027 COMPLETE CBC AUTOMATED: CPT

## 2022-12-12 PROCEDURE — 700102 HCHG RX REV CODE 250 W/ 637 OVERRIDE(OP): Performed by: STUDENT IN AN ORGANIZED HEALTH CARE EDUCATION/TRAINING PROGRAM

## 2022-12-12 PROCEDURE — 93010 ELECTROCARDIOGRAM REPORT: CPT | Performed by: INTERNAL MEDICINE

## 2022-12-12 PROCEDURE — 84100 ASSAY OF PHOSPHORUS: CPT

## 2022-12-12 PROCEDURE — 84484 ASSAY OF TROPONIN QUANT: CPT

## 2022-12-12 PROCEDURE — 82962 GLUCOSE BLOOD TEST: CPT | Mod: 91

## 2022-12-12 PROCEDURE — 700102 HCHG RX REV CODE 250 W/ 637 OVERRIDE(OP): Performed by: NURSE PRACTITIONER

## 2022-12-12 PROCEDURE — 700111 HCHG RX REV CODE 636 W/ 250 OVERRIDE (IP)

## 2022-12-12 PROCEDURE — A9270 NON-COVERED ITEM OR SERVICE: HCPCS | Performed by: STUDENT IN AN ORGANIZED HEALTH CARE EDUCATION/TRAINING PROGRAM

## 2022-12-12 PROCEDURE — 80053 COMPREHEN METABOLIC PANEL: CPT

## 2022-12-12 PROCEDURE — RXMED WILLOW AMBULATORY MEDICATION CHARGE: Performed by: STUDENT IN AN ORGANIZED HEALTH CARE EDUCATION/TRAINING PROGRAM

## 2022-12-12 PROCEDURE — 700111 HCHG RX REV CODE 636 W/ 250 OVERRIDE (IP): Performed by: PHYSICIAN ASSISTANT

## 2022-12-12 PROCEDURE — 99232 SBSQ HOSP IP/OBS MODERATE 35: CPT | Performed by: STUDENT IN AN ORGANIZED HEALTH CARE EDUCATION/TRAINING PROGRAM

## 2022-12-12 PROCEDURE — 700111 HCHG RX REV CODE 636 W/ 250 OVERRIDE (IP): Performed by: NURSE PRACTITIONER

## 2022-12-12 PROCEDURE — A9270 NON-COVERED ITEM OR SERVICE: HCPCS | Performed by: PHYSICIAN ASSISTANT

## 2022-12-12 PROCEDURE — A9270 NON-COVERED ITEM OR SERVICE: HCPCS | Performed by: INTERNAL MEDICINE

## 2022-12-12 PROCEDURE — 97530 THERAPEUTIC ACTIVITIES: CPT

## 2022-12-12 PROCEDURE — 74018 RADEX ABDOMEN 1 VIEW: CPT

## 2022-12-12 PROCEDURE — 700102 HCHG RX REV CODE 250 W/ 637 OVERRIDE(OP): Performed by: PHYSICIAN ASSISTANT

## 2022-12-12 PROCEDURE — 770020 HCHG ROOM/CARE - TELE (206)

## 2022-12-12 PROCEDURE — 97116 GAIT TRAINING THERAPY: CPT

## 2022-12-12 PROCEDURE — 78452 HT MUSCLE IMAGE SPECT MULT: CPT

## 2022-12-12 RX ORDER — ACETAMINOPHEN 500 MG
1000 TABLET ORAL EVERY 6 HOURS PRN
Qty: 100 TABLET | Refills: 0 | Status: SHIPPED | OUTPATIENT
Start: 2022-12-12

## 2022-12-12 RX ORDER — REGADENOSON 0.08 MG/ML
0.4 INJECTION, SOLUTION INTRAVENOUS ONCE
Status: COMPLETED | OUTPATIENT
Start: 2022-12-12 | End: 2022-12-12

## 2022-12-12 RX ORDER — LEVETIRACETAM 500 MG/1
500 TABLET ORAL 2 TIMES DAILY
Qty: 60 TABLET | Refills: 0 | Status: SHIPPED | OUTPATIENT
Start: 2022-12-12 | End: 2023-03-10 | Stop reason: SDUPTHER

## 2022-12-12 RX ORDER — POLYETHYLENE GLYCOL 3350 17 G/17G
17 POWDER, FOR SOLUTION ORAL DAILY
Qty: 30 EACH | Refills: 0 | Status: SHIPPED | OUTPATIENT
Start: 2022-12-12 | End: 2022-12-15 | Stop reason: SDUPTHER

## 2022-12-12 RX ORDER — AMINOPHYLLINE 25 MG/ML
100 INJECTION, SOLUTION INTRAVENOUS
Status: DISCONTINUED | OUTPATIENT
Start: 2022-12-12 | End: 2022-12-15 | Stop reason: HOSPADM

## 2022-12-12 RX ORDER — LOSARTAN POTASSIUM 50 MG/1
50 TABLET ORAL DAILY
Qty: 30 TABLET | Refills: 1 | Status: SHIPPED | OUTPATIENT
Start: 2022-12-12 | End: 2023-03-10 | Stop reason: SDUPTHER

## 2022-12-12 RX ORDER — OXYCODONE HYDROCHLORIDE 10 MG/1
10 TABLET ORAL EVERY 6 HOURS PRN
Qty: 20 TABLET | Refills: 0 | Status: SHIPPED | OUTPATIENT
Start: 2022-12-12 | End: 2022-12-20

## 2022-12-12 RX ORDER — DEXAMETHASONE 2 MG/1
TABLET ORAL
Qty: 7 TABLET | Refills: 0 | Status: SHIPPED | OUTPATIENT
Start: 2022-12-13 | End: 2022-12-16

## 2022-12-12 RX ORDER — REGADENOSON 0.08 MG/ML
INJECTION, SOLUTION INTRAVENOUS
Status: COMPLETED
Start: 2022-12-12 | End: 2022-12-12

## 2022-12-12 RX ORDER — PSEUDOEPHEDRINE HCL 30 MG
100 TABLET ORAL 2 TIMES DAILY
Qty: 60 CAPSULE | Refills: 0 | Status: SHIPPED | OUTPATIENT
Start: 2022-12-12 | End: 2023-11-11

## 2022-12-12 RX ADMIN — CEFTRIAXONE SODIUM 2000 MG: 10 INJECTION, POWDER, FOR SOLUTION INTRAVENOUS at 17:16

## 2022-12-12 RX ADMIN — DOCUSATE SODIUM 100 MG: 100 CAPSULE, LIQUID FILLED ORAL at 04:32

## 2022-12-12 RX ADMIN — ACETAMINOPHEN 1000 MG: 500 TABLET ORAL at 05:48

## 2022-12-12 RX ADMIN — DEXAMETHASONE SODIUM PHOSPHATE 6 MG: 4 INJECTION, SOLUTION INTRA-ARTICULAR; INTRALESIONAL; INTRAMUSCULAR; INTRAVENOUS; SOFT TISSUE at 04:31

## 2022-12-12 RX ADMIN — LEVETIRACETAM 500 MG: 500 TABLET, FILM COATED ORAL at 17:16

## 2022-12-12 RX ADMIN — OXYCODONE HYDROCHLORIDE 10 MG: 10 TABLET ORAL at 10:26

## 2022-12-12 RX ADMIN — LEVETIRACETAM 500 MG: 500 TABLET, FILM COATED ORAL at 04:32

## 2022-12-12 RX ADMIN — REGADENOSON 0.4 MG: 0.08 INJECTION, SOLUTION INTRAVENOUS at 08:30

## 2022-12-12 RX ADMIN — CEFTRIAXONE SODIUM 2000 MG: 10 INJECTION, POWDER, FOR SOLUTION INTRAVENOUS at 06:41

## 2022-12-12 RX ADMIN — ACETAMINOPHEN 1000 MG: 500 TABLET ORAL at 11:52

## 2022-12-12 RX ADMIN — OXYCODONE HYDROCHLORIDE 10 MG: 10 TABLET ORAL at 00:07

## 2022-12-12 RX ADMIN — ACETAMINOPHEN 1000 MG: 500 TABLET ORAL at 00:08

## 2022-12-12 RX ADMIN — DOCUSATE SODIUM 50 MG AND SENNOSIDES 8.6 MG 1 TABLET: 8.6; 5 TABLET, FILM COATED ORAL at 20:39

## 2022-12-12 RX ADMIN — POLYETHYLENE GLYCOL 3350 1 PACKET: 17 POWDER, FOR SOLUTION ORAL at 17:16

## 2022-12-12 RX ADMIN — POLYETHYLENE GLYCOL 3350 1 PACKET: 17 POWDER, FOR SOLUTION ORAL at 02:44

## 2022-12-12 RX ADMIN — MAGNESIUM HYDROXIDE 30 ML: 400 SUSPENSION ORAL at 20:39

## 2022-12-12 RX ADMIN — DOCUSATE SODIUM 100 MG: 100 CAPSULE, LIQUID FILLED ORAL at 17:16

## 2022-12-12 RX ADMIN — LOSARTAN POTASSIUM 50 MG: 50 TABLET, FILM COATED ORAL at 04:32

## 2022-12-12 RX ADMIN — DEXAMETHASONE SODIUM PHOSPHATE 4 MG: 4 INJECTION, SOLUTION INTRA-ARTICULAR; INTRALESIONAL; INTRAMUSCULAR; INTRAVENOUS; SOFT TISSUE at 17:16

## 2022-12-12 RX ADMIN — OXYCODONE HYDROCHLORIDE 10 MG: 10 TABLET ORAL at 03:11

## 2022-12-12 RX ADMIN — ACETAMINOPHEN 1000 MG: 500 TABLET ORAL at 17:16

## 2022-12-12 RX ADMIN — HYDROMORPHONE HYDROCHLORIDE 0.5 MG: 1 INJECTION, SOLUTION INTRAMUSCULAR; INTRAVENOUS; SUBCUTANEOUS at 14:50

## 2022-12-12 ASSESSMENT — COGNITIVE AND FUNCTIONAL STATUS - GENERAL
MOVING TO AND FROM BED TO CHAIR: A LITTLE
STANDING UP FROM CHAIR USING ARMS: A LITTLE
CLIMB 3 TO 5 STEPS WITH RAILING: A LITTLE
MOVING FROM LYING ON BACK TO SITTING ON SIDE OF FLAT BED: A LOT
MOBILITY SCORE: 17
WALKING IN HOSPITAL ROOM: A LITTLE
TURNING FROM BACK TO SIDE WHILE IN FLAT BAD: A LITTLE
SUGGESTED CMS G CODE MODIFIER MOBILITY: CK

## 2022-12-12 ASSESSMENT — ENCOUNTER SYMPTOMS
ABDOMINAL PAIN: 1
FEVER: 0
COUGH: 0
INSOMNIA: 0
SHORTNESS OF BREATH: 0
NERVOUS/ANXIOUS: 0
DIZZINESS: 0
VOMITING: 0
HEADACHES: 0
CHILLS: 0
DIARRHEA: 0
NAUSEA: 0
PALPITATIONS: 0
CONSTIPATION: 0
MYALGIAS: 0

## 2022-12-12 ASSESSMENT — GAIT ASSESSMENTS
ASSISTIVE DEVICE: HAND HELD ASSIST
GAIT LEVEL OF ASSIST: MINIMAL ASSIST
DISTANCE (FEET): 20

## 2022-12-12 NOTE — PROGRESS NOTES
Neurosurgery Progress Note    Subjective:  Stress test this am     Exam:  Sleeping but wakes to voice. A&O, speech fluent  EOM intact, facial symmetry  FS x4  Vision improving   Staples CDI      BP  Min: 106/72  Max: 125/62  Pulse  Av.9  Min: 59  Max: 104  Resp  Av.2  Min: 16  Max: 20  Temp  Av.2 °C (98.9 °F)  Min: 36.8 °C (98.3 °F)  Max: 37.5 °C (99.5 °F)  Monitored Temp 2  Av.7 °C (98.1 °F)  Min: 35.7 °C (96.3 °F)  Max: 37.5 °C (99.5 °F)  SpO2  Av.4 %  Min: 92 %  Max: 100 %    No data recorded    Recent Labs     12/10/22  0512 22  0345 22  0112   WBC 14.3* 17.3* 24.5*   RBC 3.06* 3.06* 3.33*   HEMOGLOBIN 9.5* 9.4* 10.3*   HEMATOCRIT 26.7* 26.8* 29.8*   MCV 87.3 87.6 89.5   MCH 31.0 30.7 30.9   MCHC 35.6* 35.1 34.6   RDW 45.1 44.5 44.6   PLATELETCT 76* 100* 145*   MPV 11.2 10.7 10.2       Recent Labs     12/10/22  0512 22  0345 22  0112   SODIUM 142 137 135   POTASSIUM 3.8 3.4* 4.1   CHLORIDE 104 99 97   CO2 28 28 28   GLUCOSE 171* 159* 157*   BUN 15 15 17   CREATININE 0.58 0.62 0.66   CALCIUM 8.5 8.5 8.5                   Intake/Output                         22 07 - 22 0659 22 - 22 0659      Total  Total                 Intake    P.O.  353  -- 353  --  -- --    P.O. 353 -- 353 -- -- --    Total Intake 353 -- 353 -- -- --       Output    Urine  550  -- 550  --  -- --    Number of Times Voided 2 x -- 2 x -- -- --    Urine Void (mL) 550 -- 550 -- -- --    Emesis  50  -- 50  --  -- --    Emesis 50 -- 50 -- -- --    Emesis - Number of Times 1 x -- 1 x -- -- --    Stool  --  -- --  --  -- --    Number of Times Stooled 3 x 1 x 4 x -- -- --    Total Output 600 -- 600 -- -- --       Net I/O     -247 -- -247 -- -- --              Intake/Output Summary (Last 24 hours) at 2022 0942  Last data filed at 2022 1730  Gross per 24 hour   Intake 353 ml   Output 600 ml   Net -247 ml                aminophylline  100 mg Once PRN    simethicone  125 mg TID PRN    dexamethasone  4 mg Q12HRS    Followed by    [START ON 12/13/2022] dexamethasone  2 mg Q12HRS    Followed by    [START ON 12/15/2022] dexamethasone  2 mg DAILY    insulin GLARGINE  5 Units Q EVENING    losartan  50 mg Q DAY    artificial tears  1 Application PRN    insulin regular  2-9 Units 4X/DAY ACHS    And    dextrose bolus  25 g Q15 MIN PRN    levETIRAcetam  500 mg BID    Pharmacy   PHARMACY TO DOSE    acetaminophen  1,000 mg Q6HRS    cefTRIAXone (ROCEPHIN) IV  2,000 mg Q12HRS    Pharmacy Consult Request  1 Each PHARMACY TO DOSE    MD ALERT...DO NOT ADMINISTER NSAIDS or ASPIRIN unless ORDERED By Neurosurgery  1 Each PRN    ondansetron  4 mg Q4HRS PRN    diphenhydrAMINE  25 mg Q6HRS PRN    scopolamine  1 Patch Q72HRS PRN    docusate sodium  100 mg BID    senna-docusate  1 Tablet Nightly    senna-docusate  1 Tablet Q24HRS PRN    polyethylene glycol/lytes  1 Packet BID PRN    magnesium hydroxide  30 mL QDAY PRN    bisacodyl  10 mg Q24HRS PRN    sodium phosphate  1 Each Once PRN    oxyCODONE immediate-release  5 mg Q3HRS PRN    Or    oxyCODONE immediate-release  10 mg Q3HRS PRN    Or    HYDROmorphone  0.5 mg Q3HRS PRN    diphenhydrAMINE  25 mg Q6HRS PRN    Or    diphenhydrAMINE  25 mg Q6HRS PRN    benzocaine-menthol  1 Lozenge Q2HRS PRN    Respiratory Therapy Consult   Continuous RT    ondansetron  4 mg Q4HRS PRN    promethazine  12.5-25 mg Q4HRS PRN    promethazine  12.5-25 mg Q4HRS PRN    prochlorperazine  5-10 mg Q4HRS PRN       Assessment and Plan:  Hospital day # 5  POD# 5 brain tumor, craniotomy for resection   Chemical prophylactic DVT therapy: yes  Start date/time: today if indicated    Ct head stable  MRI shows GTR small possible stroke nothing to cause the degree of vision loss likely swelling and shift continue to improve with vision   Keppra 500 BID  Dex taper  PT/OT, recommending placement. PMR pending   Continue pain control, bowel  regimen      Appreciate ongoing med mgmt per IM team, stress test today

## 2022-12-12 NOTE — DISCHARGE PLANNING
Spoke w/Dr. Mendes about pt for HH for PT/OT.  He is in agreement. Spoke w/pt and he choose [facility].  Informed pt they will call if they can accept pt. And give them a couple of days to call.  Given permission to sign his name due to impaired vision.     No other needs at this time. Faxed to ANDRE[facility].

## 2022-12-12 NOTE — ASSESSMENT & PLAN NOTE
"     12/12/2022  Unidentified object in GI tract.  Continue serial abdominal x-rays until cleared.\"    Repeat AXR tomorrow   Pain improved after Go lytely  "

## 2022-12-12 NOTE — CARE PLAN
Problem: Pain - Standard  Goal: Alleviation of pain or a reduction in pain to the patient’s comfort goal  Outcome: Progressing  Note: PRN Oxy 10 given with good results reported by patient. Still experiencing right upper abdominal pain. Patient states it feels bloated, Miralax PRN given. Patient did have small BM, no foreign object found per imaging report.     Problem: Bowel Elimination  Goal: Establish and maintain regular bowel function  Note: Patient did have small BM overnight, no foreign object found. Patient is still experiencing abdominal pain. RN encouraged mobility to help with BM. Stool softeners given per MAR.   The patient is Watcher - Medium risk of patient condition declining or worsening    Shift Goals  Clinical Goals: Have BM and manage stomach pain  Patient Goals: manage pain  Family Goals: elizabeth    Progress made toward(s) clinical / shift goals:  see notes for progress    Patient is not progressing towards the following goals:

## 2022-12-12 NOTE — PROGRESS NOTES
Hospital Medicine Daily Progress Note    Date of Service  12/12/2022    Chief Complaint  Ambrosio Hardy is a 57 y.o. male admitted 11/30/2022 with headache    Hospital Course  Ambrosio Hardy is a 57 y.o. male who presented 11/30/2022 with headaches. This is a pleasant gentleman with a history of hypertension who presented with worsening headaches for 2 months.  The headache was moderate to severe.  Patient did not have any focal weaknesses or paresthesias.  He did have intermittent blurry vision although on presentation he was not complaining of any significant vision problems.  He presented to [facility] in [place], where a head CT showed possible occipital mass with midline shift.  Patient was subsequently transferred to [facility] for a neurosurgical consultation.  The MRI showed a large occipital homogeneously enhancing mass consistent with a meningioma.  Neurosurgery was consulted and the patient was started on dexamethasone due to cerebral edema secondary to brain compression from the tumor.    The patient underwent embolization of large feeders to the meningioma from the right occipital artery, right MMA, and left MMA using 150-300 UM particles and Gelfoam by Dr. Mcdonald of interventional radiology on 12/5/2022.  Patient subsequently underwent resection of the meningioma on 12/7/2022 by Dr. Dieudonne Donis. There was large amount of blood loss requiring multiple units of blood transfusion including 4 units of PRBCs and 4 units of FFP.  Patient was readmitted to the intensive care unit intubated.  He was extubated on 12/8/2022.    Post patient developed some inability to perceive light, which was concerning for a occipital stroke versus brain shift.  MRI of the brain showed a small right posterior cerebral artery stroke.    On 12/10/2022, patient developed some stomach discomfort associated with dynamic T wave inversions on EKG.  Troponins were  negative x3.  Echocardiogram showed an ejection fraction of 60 to 65% with no regional wall abnormalities.  Enlarged right ventricle was noted.  Patient underwent a MPI stress test.        Interval Problem Update  12/10: Medicine had previously signed off.  However, notified of T wave inversions on EKG.  Patient currently denies any chest pain although he reports some discomfort in his stomach.  Initial troponin was negative.  I have ordered 2 additional troponins.  No nausea vomiting.  Patient is status post resection of meningioma postop day #3.  He denies history of diabetes.    12/11: No significant events overnight.  No more stomach discomfort overnight.  Repeat EKG yesterday showing stable T wave inversions.  Troponins negative x3.  Echocardiogram showing normal ejection fraction of 65%.  No significant valvular disease.  Large right ventricular size.  MPI stress test ordered.  Unfortunately patient ate and had coffee this morning so would not be able to go until tomorrow.  Awaiting physiatry consultation.  Planning to discharge to inpatient acute rehabilitation versus skilled nursing facility.      Personally reviewed the MRI of the brain with and without contrast performed on 12/10/2022.  Per my read no remaining enhancing mass in the left occipital area.  There is increased DWI signal in the right occipital area consistent with a small PCA stroke.  There is also increased T2 signal consistent with mild cerebral edema.      I personally reviewed the EKG below.  Per my read normal sinus rhythm with heart rate of 74, QTC of 460, T wave inversions in V1, V2, V3, which are stable.                12/12/2022  No significant events overnight.  Patient continues to have abdominal pain.  Abdominal x-ray showing a foreign body that is moving along the lower GI tract.  Stress testing negative.  No further intervention or work-up of chest pain indicated at this time.  Due to patient's [employer] insurance, unlikely to be  accepted to local skilled nursing facility.  Will be discharged with home health likely tomorrow once the foreign body passes through his GI tract.  Abdominal x-ray for tomorrow has been ordered.    Abdominal x-ray per my read shows a foreign body within the lower GI tract that is moving on subsequent x-ray.    I have discussed this patient's plan of care and discharge plan with bedside nurse and case management.    Consultants/Specialty  neurosurgery    Code Status  Full Code    Disposition  Patient is not medically cleared for discharge.   Anticipate discharge to to an inpatient rehabilitation hospital versus skilled nursing facility.  I have placed the appropriate orders for post-discharge needs.    Review of Systems  Review of Systems   Constitutional:  Positive for malaise/fatigue. Negative for chills and fever.   Respiratory:  Negative for cough and shortness of breath.    Cardiovascular:  Negative for chest pain and palpitations.   Gastrointestinal:  Positive for abdominal pain. Negative for constipation, diarrhea, nausea and vomiting.   Genitourinary:  Negative for dysuria and hematuria.   Musculoskeletal:  Negative for joint pain and myalgias.   Neurological:  Negative for dizziness and headaches.   Psychiatric/Behavioral:  The patient is not nervous/anxious and does not have insomnia.       Physical Exam  Temp:  [36.6 °C (97.9 °F)-37.4 °C (99.4 °F)] 36.6 °C (97.9 °F)  Pulse:  [] 88  Resp:  [16-20] 18  BP: (106-123)/(59-72) 115/67  SpO2:  [93 %-100 %] 98 %    Physical Exam  Vitals and nursing note reviewed.   Constitutional:       General: He is not in acute distress.     Appearance: Normal appearance. He is not ill-appearing.   HENT:      Head: Normocephalic and atraumatic.      Comments: Craniotomy incision clean dry and intact     Mouth/Throat:      Mouth: Mucous membranes are moist.      Pharynx: Oropharynx is clear. No oropharyngeal exudate.   Eyes:      General: No scleral icterus.        Right  eye: No discharge.         Left eye: No discharge.      Conjunctiva/sclera: Conjunctivae normal.   Cardiovascular:      Rate and Rhythm: Normal rate and regular rhythm.      Pulses: Normal pulses.      Heart sounds: Normal heart sounds. No murmur heard.  Pulmonary:      Effort: Pulmonary effort is normal. No respiratory distress.      Breath sounds: Normal breath sounds.   Abdominal:      General: Abdomen is flat. Bowel sounds are normal. There is no distension.      Palpations: Abdomen is soft.   Musculoskeletal:         General: No swelling.      Cervical back: Neck supple. No tenderness.      Right lower leg: No edema.      Left lower leg: No edema.   Skin:     General: Skin is warm and dry.      Coloration: Skin is not pale.   Neurological:      Mental Status: He is alert and oriented to person, place, and time. Mental status is at baseline.      Motor: Weakness (Diffuse weakness in all 4 extremities) present.      Comments: Alert and orient x3.  There is no drift.   Psychiatric:         Thought Content: Thought content normal.         Judgment: Judgment normal.       Fluids    Intake/Output Summary (Last 24 hours) at 12/12/2022 1506  Last data filed at 12/12/2022 1400  Gross per 24 hour   Intake 240 ml   Output 50 ml   Net 190 ml         Laboratory  Recent Labs     12/10/22  0512 12/11/22  0345 12/12/22  0112   WBC 14.3* 17.3* 24.5*   RBC 3.06* 3.06* 3.33*   HEMOGLOBIN 9.5* 9.4* 10.3*   HEMATOCRIT 26.7* 26.8* 29.8*   MCV 87.3 87.6 89.5   MCH 31.0 30.7 30.9   MCHC 35.6* 35.1 34.6   RDW 45.1 44.5 44.6   PLATELETCT 76* 100* 145*   MPV 11.2 10.7 10.2       Recent Labs     12/10/22  0512 12/11/22  0345 12/12/22  0112   SODIUM 142 137 135   POTASSIUM 3.8 3.4* 4.1   CHLORIDE 104 99 97   CO2 28 28 28   GLUCOSE 171* 159* 157*   BUN 15 15 17   CREATININE 0.58 0.62 0.66   CALCIUM 8.5 8.5 8.5                       Imaging  [id number] VIEW   Final Result      Short segment of wire-like radiopaque foreign body has migrated  over to the right midabdomen. Presumably transit within small bowel.      NM-CARDIAC STRESS TEST   Final Result      [id number] VIEW   Final Result      1.  Nonobstructive bowel gas pattern. Small amount of stool throughout the colon.   2.  A 2.2 cm linear radiopaque object projecting over the left lower abdomen may represent a foreign body.      MR-BRAIN-WITH & W/O   Final Result      1.  Postoperative bilateral parietal craniectomy and cranioplasty with gross total resection of a large bilateral posterior parietal meningioma. Expected postoperative changes with some hemorrhage along the posterior falx and left parietal and occipital    lobes at the operative site. Some surrounding cytotoxic edema in the brain parenchyma with a globoid area of bright signal in the right lateral occipital-parietal lobe which may represent an area of postoperative infarct. Minimal residual enhancement    along the falx.   2.  Small left hemisphere subdural hygroma.      EC-ECHOCARDIOGRAM COMPLETE W/O CONT   Final Result      DX-CHEST-PORTABLE (1 VIEW)   Final Result         1.  Interstitial pulmonary parenchymal prominence, compatible with interstitial edema and/or infiltrates, slightly more pronounced compared to prior study.   2.  Cardiomegaly      DX-CHEST-PORTABLE (1 VIEW)   Final Result         1.  Interstitial pulmonary parenchymal prominence, compatible with interstitial edema and/or infiltrates.      CT-HEAD W/O   Final Result         1.  Streaky hyperdense hemorrhage and pneumocephalus within occipital surgical bed, within expected limits for recent postop changes.   2.  Atherosclerosis.         DX-CHEST-PORTABLE (1 VIEW)   Final Result         1.  No acute cardiopulmonary disease.      DX-CHEST-PORTABLE (1 VIEW)   Final Result      No acute cardiopulmonary abnormality.      IR-EMBOLIZE-NEURO-INTRACRANIAL   Final Result   Impression:      57-year-old patient with a large bilateral midline parieto-occipital meningioma  underwent cerebral angiography and embolization as described above.      Tumor blush with feeders from the right middle meningeal, left middle meningeal, right occipital artery were embolized to occlusion using a combination of particles and Gelfoam.      Additional leptomeningeal feeders to the meningioma arise from left MCA, DENISSE, PCA.      A small arterial venous shunt is also noted within the lesion arising from small branches of the left posterior cerebral artery.      Occlusion of the posterior segment of the superior sagittal sinus is demonstrated due to invasion by the tumor. Multiple collateral veins have developed around the occluded segment with predominant anterior drainage into the superior anastomotic veins.      IAdelina was physically present and participated during the entire procedure of the IR-EMBOLIZE-NEURO-INTRACRANIAL.      MR-STEALTH BRAIN WITH & W/O   Final Result      There is an approximately 6.7 x 4.7 x 5.9 cm sized enhancing extra-axial mass noted in the bilateral mid posterior parietal region along the both sides of posterior falx cerebri. The superior sagittal sinus is infiltrated and surrounded by this tumor.    There is also abnormal adjacent dural enhancement .The adjacent parietal/occipital bones are infiltrated by this tumor. There is also hyperostosis of the calvarial surface of the bones with enhancement. There are multiple enlarged blood vessels are noted    along the anterior margin of the lesion. There is diffuse white matter edema noted in the bilateral parietal and occipital lobes surrounding the lesion.There is an approximately 6 mm midline shift towards right side. 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 The other differential diagnosis includes hemangiopericytoma and dural based metastasis. Findings were discussed with SUSY PRATT on 12/1/2022 7:43 AM.      MR-BRAIN-WITH  & W/O   Final Result      There is an approximately 6.7 x 4.7 x 5.9 cm sized enhancing extra-axial mass noted in the bilateral mid posterior parietal region along the both sides of posterior falx cerebri. The superior sagittal sinus is infiltrated and surrounded by this tumor.    There is also abnormal adjacent dural enhancement .The adjacent parietal/occipital bones are infiltrated by this tumor. There is also hyperostosis of the calvarial surface of the bones with enhancement. There are multiple enlarged blood vessels are noted    along the anterior margin of the lesion. There is diffuse white matter edema noted in the bilateral parietal and occipital lobes surrounding the lesion.There is an approximately 6 mm midline shift towards right side. 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 The other differential diagnosis includes hemangiopericytoma and dural based metastasis. Findings were discussed with SUSY PRATT on 12/1/2022 7:43 AM.      CT-CHEST,ABDOMEN,PELVIS WITH   Final Result         1.  No significant abnormality in thorax, abdomen and pelvis CT scan.   2.  Hepatomegaly   3.  Cholelithiasis   4.  Segment 2 duodenal diverticula   5.  Atherosclerosis   6.  Fat-containing left inguinal hernia   7.  Fat-containing umbilical hernia   8.  Diverticulosis   9.  Main pulmonary artery dilatation, appearance suggests changes related to pulmonary artery aneurysm or pulmonary hypertension.             Assessment/Plan       * Meningioma, cerebral (HCC)- (present on admission)  Assessment & Plan  As per pathology.  Status post resection 12/7/2022.    Postoperative cerebrovascular infarction following non-cardiac surgery (HCC)- (present on admission)  Assessment & Plan  12/11/2022:  MRI of the brain showing right PCA stroke following resection of the meningioma.  No need for aspirin at this time.    Acute ischemic right PCA stroke (HCC)-  (present on admission)  Assessment & Plan  12/11/2022:  MRI showing perioperative right PCA stroke.  Likely cause of vision deficit.    Fall precautions  PT and OT consults    Cerebral edema (HCC)- (present on admission)  Assessment & Plan  Associated with brain compression secondary to meningioma.  Patient also has postoperative cerebral edema.    12/11/2022  Continue dexamethasone taper as per neurosurgery    Brain compression (HCC)- (present on admission)  Assessment & Plan  Secondary to large meningioma.  Significant brain compression due to the meningioma.  Associated with vasogenic edema.    Status post resection 12/7/2022.    12/11/2022:  PT and OT recommending postacute placement.  Speech therapy recommending speech therapy 3 times a week.  Awaiting physiatry consultation. I have placed referral for skilled nursing facilities.    T wave inversion in EKG  Assessment & Plan  12/10/2022:  New T wave inversions without any chest pain.  Troponin initially negative.  Plan will be to check serial troponins and continue telemetry monitoring.  Serial EKGs as needed for chest pain.  If troponins become positive, we will have to consider possibility of NSTEMI and consult cardiology.  If troponins are negative, recommend proceeding with MPI stress test.  I have ordered an echocardiogram.     12/11/2022:  Stable T wave inversion.  Troponin negative x3.  Echocardiogram EF 65%, no regional wall abnormalities.  No valvular normalities.  Large right ventricular size.  Pending MPI stress test.  Patient had coffee this morning so cannot undergo stress test today.  Planning for tomorrow morning.    FB GI (foreign body in gastrointestinal tract), initial encounter- (present on admission)  Assessment & Plan       12/12/2022  Unidentified object in GI tract.  Continue serial abdominal x-rays until cleared.    Abdominal pain- (present on admission)  Assessment & Plan  Etiology unclear.  Possibly related to foreign body.    Continue  serial abdominal x-rays.    Hypokalemia  Assessment & Plan  12/11/2022:   Potassium low at 3.4 today.  Replaced with potassium chloride 40 mg x 2 for goal greater than 4.0.    Brain mass- (present on admission)  Assessment & Plan  Status post resection of meningioma 12/17/2022.  Management as per neurosurgery.    Leukocytosis- (present on admission)  Assessment & Plan  Likely secondary to steroids.  No signs of infection at this time.    SIRS (systemic inflammatory response syndrome) (HCC)- (present on admission)  Assessment & Plan  SIRS criteria identified on my evaluation include:  Fever, with temperature greater than 101 deg F, Tachycardia, with heart rate greater than 90 BPM, Tachypnea, with respirations greater than 20 per minute and Leukocytosis, with WBC greater than 12,000  SIRS is non-infectious, the patient does not have sepsis    Resolved in the ICU.  Likely perioperative.      Lactic acidosis  Assessment & Plan  Patient had lactic acidosis in the ICU.    Resolved.    On mechanically assisted ventilation (HCC)- (present on admission)  Assessment & Plan  Patient was intubated for surgery and postoperatively.  He was extubated on 12/8/2022 and transferred out of the ICU.    Hyponatremia- (present on admission)  Assessment & Plan  Resolved    Hyperglycemia- (present on admission)  Assessment & Plan  A1c 6.4%    12/10/2022:  Blood glucose worsening into the 200s secondary to dexamethasone.  Start glargine insulin 5 units daily  Continue lispro insulin sliding scale with hypoglycemia protocol    12/11/2022  Blood glucose control improving to 150s to 190s.  Continue glargine 5 units daily with lispro SSI scale and hypoglycemia protocol.  Regular diet changed to carbohydrate consistent diet.    Hypertension- (present on admission)  Assessment & Plan  12/10/2022:  Blood pressures controlled.  Goal of less than 130/80.  Resume home losartan at lower dose of 50 mg daily.    12/11/2022:  Blood pressures well  controlled.  Continuing on losartan 50 mg daily.        I have performed a physical exam and reviewed and updated ROS and Plan today (12/12/2022). In review of yesterday's note (12/11/2022), there are no changes except as documented above.    VTE prophylaxis:     SCDs/TEDs

## 2022-12-12 NOTE — CARE PLAN
The patient is Stable - Low risk of patient condition declining or worsening    Shift Goals  Clinical Goals: Have a BM  Patient Goals: manage pain  Family Goals: elizabeth    Progress made toward(s) clinical / shift goals:  Continues with Stomach pains, down to Xray for repeat ADB Xray. Stress test done this AM. Continues with vision loss.

## 2022-12-12 NOTE — THERAPY
Physical Therapy   Daily Treatment     Patient Name: Ambrosio Hardy  Age:  57 y.o., Sex:  male  Medical Record #: [id number]  Today's Date: 12/12/2022    Precautions: Fall Risk  Comments: visual impairment post-op    Assessment  Pt seen for PT treatment session, details below. Pt becoming very nauseous with mobility, was able to amb with HHA and min/CGA to toilet and had active vomiting. Pt c/o significant abdominal pain and nausea, RN notified. Pt returned supine at end of session. PT will follow.     Plan  Continue current treatment plan.  DC Equipment Recommendations: Unable to determine at this time  Discharge Recommendations: Recommend post-acute placement for additional physical therapy services prior to discharge home       12/12/22 1151   Cognition    Level of Consciousness Alert   Attention Impaired   Comments pt very distracted today by nausea, difficulty redirecting pt despite  present and asking questions, pt still with visual deficit and difficulty turning body and moving to the L and seeing objects on the L   Balance   Sitting Balance (Static) Good   Sitting Balance (Dynamic) Fair +   Standing Balance (Static) Fair -   Standing Balance (Dynamic) Poor +   Weight Shift Sitting Fair   Weight Shift Standing Fair   Skilled Intervention Verbal Cuing;Tactile Cuing   Comments standing with HHA   Gait Analysis   Gait Level Of Assist Minimal Assist   Assistive Device Hand Held Assist   Distance (Feet) 20   # of Times Distance Traveled 2   Weight Bearing Status no restrictions   Skilled Intervention Verbal Cuing;Tactile Cuing   Comments pt with difficulty turning body to the L and avoiding obstacles due to not moving towards the L despite VCs and manual cues. mostly amb with CGA however needs min A to avoid obstacles and to prevent anterior LOB when becoming too distracted by nausea.   Bed Mobility    Supine to Sit Supervised   Sit to Supine Supervised   Scooting Supervised   Functional Mobility    Sit to Stand Contact Guard Assist   Toilet Transfers Moderate Assist   Skilled Intervention Verbal Cuing;Tactile Cuing;Sequencing   Comments mod A to sit onto toilet, pt with difficulty realizing toilet is behind him and tries to either maintain standing or move fwd to look for toilet. pt sat on toilet incr time d/t feeling nauseous and constipated, pt actually manually forcing himself to vomit, RN present.   Short Term Goals    Short Term Goal # 1 Pt will perform supine <> sit with SPV in 6 visits to get in/out of bed   Goal Outcome # 1 Goal met   Short Term Goal # 2 Pt will perform STS transfers with SPV in 6 visits to get in/out of chair   Goal Outcome # 2 Goal not met   Short Term Goal # 3 Pt will ambulate 150ft with no AD and SPV in 6 visits to access household distances   Goal Outcome # 3 Goal not met

## 2022-12-12 NOTE — PROGRESS NOTES
Monitor Summary: SB-SR 57-76, MI .0.16, QRS .0.08, QT .0.46 with rare PVCs per strip from monitor room

## 2022-12-12 NOTE — FACE TO FACE
Face to Face Note  -  Durable Medical Equipment    Ernst Rodriguez M.D. - NPI: [id number]  I certify that this patient is under my care and that they have had a durable medical equipment(DME)face to face encounter by myself that meets the physician DME face-to-face encounter requirements with this patient on:    Date of encounter:   Patient:                    MRN:                       YOB: 2022  Ambrosio Hardy  [id number]  1965     The encounter with the patient was in whole, or in part, for the following medical condition, which is the primary reason for durable medical equipment:  Other - weakness and gait instability, vision loss, status post craniotomy    I certify that, based on my findings, the following durable medical equipment is medically necessary:  Walkers.        ------------------------------------------------------------------------------------------------------------------    Face to Face Supporting Documentation - Home Health    The encounter with this patient was in whole or in part the primary reason for home health admission.    Date of encounter:   Patient:                    MRN:                       YOB: 2022  Ambrosio Hardy  [id number]  1965     Home health to see patient for:  Skilled Nursing care for assessment, interventions & education, Physical Therapy evaluation and treatment, Occupational therapy evaluation and treatment, and Speech Language Pathology evaluation and treatment    Skilled need for:  Surgical Aftercare craniotomy and resection of meningioma.  Vision loss, weakness, gait instability    Skilled nursing interventions to include:  Comment: Nursing education and medication management.    Homebound evidenced status by:  Need the aid of supportive devices such as crutches, canes, wheelchairs or walkers. Leaving home must require a considerable and taxing effort. There must exist a normal inability to leave  the home.    Community Physician to provide follow up care: Pcp Pt States None     Optional Interventions    Wound information & treatment:    Home Infusion Therapy orders:    Line/Drain/Airway:    I certify the face to face encounter for this home care referral meets the CMS requirements and the encounter/clinical assessment with the patient was, in whole, or in part, for the medical condition(s) listed above, which is the primary reason for home health care. Based on my clinical findings: the service(s) are medically necessary, support the need for home health care, and the homebound criteria are met.  I certify that this patient has had a face to face encounter by myself.  Ernst Rodriguez M.D. - NPI: [id number]    *Debility, frailty and advanced age in the absence of an acute deterioration or exacerbation of a condition do not qualify a patient for home health.

## 2022-12-13 ENCOUNTER — APPOINTMENT (OUTPATIENT)
Dept: RADIOLOGY | Facility: MEDICAL CENTER | Age: 57
DRG: 025 | End: 2022-12-13
Attending: STUDENT IN AN ORGANIZED HEALTH CARE EDUCATION/TRAINING PROGRAM
Payer: COMMERCIAL

## 2022-12-13 ENCOUNTER — APPOINTMENT (OUTPATIENT)
Dept: RADIOLOGY | Facility: MEDICAL CENTER | Age: 57
DRG: 025 | End: 2022-12-13
Attending: INTERNAL MEDICINE
Payer: COMMERCIAL

## 2022-12-13 LAB
ALBUMIN SERPL BCP-MCNC: 2.8 G/DL (ref 3.2–4.9)
ALBUMIN/GLOB SERPL: 1.3 G/DL
ALP SERPL-CCNC: 62 U/L (ref 30–99)
ALT SERPL-CCNC: 40 U/L (ref 2–50)
ANION GAP SERPL CALC-SCNC: 10 MMOL/L (ref 7–16)
APPEARANCE UR: CLEAR
AST SERPL-CCNC: 16 U/L (ref 12–45)
BACTERIA BLD CULT: NORMAL
BACTERIA BLD CULT: NORMAL
BILIRUB SERPL-MCNC: 0.6 MG/DL (ref 0.1–1.5)
BILIRUB UR QL STRIP.AUTO: NEGATIVE
BUN SERPL-MCNC: 17 MG/DL (ref 8–22)
CALCIUM SERPL-MCNC: 7.9 MG/DL (ref 8.5–10.5)
CHLORIDE SERPL-SCNC: 95 MMOL/L (ref 96–112)
CO2 SERPL-SCNC: 24 MMOL/L (ref 20–33)
COLOR UR: YELLOW
CREAT SERPL-MCNC: 0.55 MG/DL (ref 0.5–1.4)
ERYTHROCYTE [DISTWIDTH] IN BLOOD BY AUTOMATED COUNT: 43.8 FL (ref 35.9–50)
GFR SERPLBLD CREATININE-BSD FMLA CKD-EPI: 116 ML/MIN/1.73 M 2
GLOBULIN SER CALC-MCNC: 2.1 G/DL (ref 1.9–3.5)
GLUCOSE BLD STRIP.AUTO-MCNC: 158 MG/DL (ref 65–99)
GLUCOSE BLD STRIP.AUTO-MCNC: 167 MG/DL (ref 65–99)
GLUCOSE BLD STRIP.AUTO-MCNC: 177 MG/DL (ref 65–99)
GLUCOSE BLD STRIP.AUTO-MCNC: 187 MG/DL (ref 65–99)
GLUCOSE SERPL-MCNC: 171 MG/DL (ref 65–99)
GLUCOSE UR STRIP.AUTO-MCNC: 250 MG/DL
HCT VFR BLD AUTO: 30.3 % (ref 42–52)
HGB BLD-MCNC: 10.7 G/DL (ref 14–18)
INR PPP: 1.18 (ref 0.87–1.13)
KETONES UR STRIP.AUTO-MCNC: NEGATIVE MG/DL
LACTATE SERPL-SCNC: 1.1 MMOL/L (ref 0.5–2)
LACTATE SERPL-SCNC: 3 MMOL/L (ref 0.5–2)
LEUKOCYTE ESTERASE UR QL STRIP.AUTO: NEGATIVE
MAGNESIUM SERPL-MCNC: 2.2 MG/DL (ref 1.5–2.5)
MCH RBC QN AUTO: 30.8 PG (ref 27–33)
MCHC RBC AUTO-ENTMCNC: 35.3 G/DL (ref 33.7–35.3)
MCV RBC AUTO: 87.3 FL (ref 81.4–97.8)
MICRO URNS: ABNORMAL
NITRITE UR QL STRIP.AUTO: NEGATIVE
PH UR STRIP.AUTO: 7 [PH] (ref 5–8)
PHOSPHATE SERPL-MCNC: 2.6 MG/DL (ref 2.5–4.5)
PLATELET # BLD AUTO: 162 K/UL (ref 164–446)
PMV BLD AUTO: 9.9 FL (ref 9–12.9)
POTASSIUM SERPL-SCNC: 4.1 MMOL/L (ref 3.6–5.5)
PROCALCITONIN SERPL-MCNC: 0.18 NG/ML
PROT SERPL-MCNC: 4.9 G/DL (ref 6–8.2)
PROT UR QL STRIP: NEGATIVE MG/DL
PROTHROMBIN TIME: 14.8 SEC (ref 12–14.6)
RBC # BLD AUTO: 3.47 M/UL (ref 4.7–6.1)
RBC UR QL AUTO: NEGATIVE
SIGNIFICANT IND 70042: NORMAL
SIGNIFICANT IND 70042: NORMAL
SITE SITE: NORMAL
SITE SITE: NORMAL
SODIUM SERPL-SCNC: 129 MMOL/L (ref 135–145)
SOURCE SOURCE: NORMAL
SOURCE SOURCE: NORMAL
SP GR UR STRIP.AUTO: 1.02
TROPONIN T SERPL-MCNC: 11 NG/L (ref 6–19)
TROPONIN T SERPL-MCNC: 12 NG/L (ref 6–19)
TROPONIN T SERPL-MCNC: 12 NG/L (ref 6–19)
TROPONIN T SERPL-MCNC: 13 NG/L (ref 6–19)
TROPONIN T SERPL-MCNC: 15 NG/L (ref 6–19)
TROPONIN T SERPL-MCNC: 16 NG/L (ref 6–19)
UROBILINOGEN UR STRIP.AUTO-MCNC: 0.2 MG/DL
WBC # BLD AUTO: 23 K/UL (ref 4.8–10.8)

## 2022-12-13 PROCEDURE — 83605 ASSAY OF LACTIC ACID: CPT

## 2022-12-13 PROCEDURE — 85610 PROTHROMBIN TIME: CPT

## 2022-12-13 PROCEDURE — 81003 URINALYSIS AUTO W/O SCOPE: CPT

## 2022-12-13 PROCEDURE — 700101 HCHG RX REV CODE 250: Performed by: INTERNAL MEDICINE

## 2022-12-13 PROCEDURE — A9270 NON-COVERED ITEM OR SERVICE: HCPCS | Performed by: NURSE PRACTITIONER

## 2022-12-13 PROCEDURE — 74018 RADEX ABDOMEN 1 VIEW: CPT

## 2022-12-13 PROCEDURE — 700102 HCHG RX REV CODE 250 W/ 637 OVERRIDE(OP): Performed by: INTERNAL MEDICINE

## 2022-12-13 PROCEDURE — 84145 PROCALCITONIN (PCT): CPT

## 2022-12-13 PROCEDURE — 80053 COMPREHEN METABOLIC PANEL: CPT

## 2022-12-13 PROCEDURE — A9270 NON-COVERED ITEM OR SERVICE: HCPCS | Performed by: STUDENT IN AN ORGANIZED HEALTH CARE EDUCATION/TRAINING PROGRAM

## 2022-12-13 PROCEDURE — 700102 HCHG RX REV CODE 250 W/ 637 OVERRIDE(OP): Performed by: PHYSICIAN ASSISTANT

## 2022-12-13 PROCEDURE — 700111 HCHG RX REV CODE 636 W/ 250 OVERRIDE (IP): Performed by: INTERNAL MEDICINE

## 2022-12-13 PROCEDURE — 84100 ASSAY OF PHOSPHORUS: CPT

## 2022-12-13 PROCEDURE — 99233 SBSQ HOSP IP/OBS HIGH 50: CPT | Performed by: INTERNAL MEDICINE

## 2022-12-13 PROCEDURE — 71045 X-RAY EXAM CHEST 1 VIEW: CPT

## 2022-12-13 PROCEDURE — 82962 GLUCOSE BLOOD TEST: CPT

## 2022-12-13 PROCEDURE — 770020 HCHG ROOM/CARE - TELE (206)

## 2022-12-13 PROCEDURE — 87040 BLOOD CULTURE FOR BACTERIA: CPT

## 2022-12-13 PROCEDURE — 700102 HCHG RX REV CODE 250 W/ 637 OVERRIDE(OP): Performed by: NURSE PRACTITIONER

## 2022-12-13 PROCEDURE — A9270 NON-COVERED ITEM OR SERVICE: HCPCS | Performed by: INTERNAL MEDICINE

## 2022-12-13 PROCEDURE — 700111 HCHG RX REV CODE 636 W/ 250 OVERRIDE (IP): Performed by: PHYSICIAN ASSISTANT

## 2022-12-13 PROCEDURE — 83735 ASSAY OF MAGNESIUM: CPT

## 2022-12-13 PROCEDURE — 700102 HCHG RX REV CODE 250 W/ 637 OVERRIDE(OP): Performed by: STUDENT IN AN ORGANIZED HEALTH CARE EDUCATION/TRAINING PROGRAM

## 2022-12-13 PROCEDURE — 85027 COMPLETE CBC AUTOMATED: CPT

## 2022-12-13 PROCEDURE — 84484 ASSAY OF TROPONIN QUANT: CPT | Mod: 91

## 2022-12-13 PROCEDURE — A9270 NON-COVERED ITEM OR SERVICE: HCPCS | Performed by: PHYSICIAN ASSISTANT

## 2022-12-13 PROCEDURE — 36415 COLL VENOUS BLD VENIPUNCTURE: CPT

## 2022-12-13 RX ADMIN — DOCUSATE SODIUM 100 MG: 100 CAPSULE, LIQUID FILLED ORAL at 18:24

## 2022-12-13 RX ADMIN — BISACODYL 10 MG: 10 SUPPOSITORY RECTAL at 05:11

## 2022-12-13 RX ADMIN — OXYCODONE HYDROCHLORIDE 10 MG: 10 TABLET ORAL at 05:16

## 2022-12-13 RX ADMIN — LEVETIRACETAM 500 MG: 500 TABLET, FILM COATED ORAL at 05:18

## 2022-12-13 RX ADMIN — CEFTRIAXONE SODIUM 2000 MG: 10 INJECTION, POWDER, FOR SOLUTION INTRAVENOUS at 18:24

## 2022-12-13 RX ADMIN — DEXAMETHASONE SODIUM PHOSPHATE 4 MG: 4 INJECTION, SOLUTION INTRA-ARTICULAR; INTRALESIONAL; INTRAMUSCULAR; INTRAVENOUS; SOFT TISSUE at 05:18

## 2022-12-13 RX ADMIN — ACETAMINOPHEN 1000 MG: 500 TABLET ORAL at 00:24

## 2022-12-13 RX ADMIN — INSULIN HUMAN 2 UNITS: 100 INJECTION, SOLUTION PARENTERAL at 20:02

## 2022-12-13 RX ADMIN — DOCUSATE SODIUM 50 MG AND SENNOSIDES 8.6 MG 1 TABLET: 8.6; 5 TABLET, FILM COATED ORAL at 20:01

## 2022-12-13 RX ADMIN — LEVETIRACETAM 500 MG: 500 TABLET, FILM COATED ORAL at 18:24

## 2022-12-13 RX ADMIN — LOSARTAN POTASSIUM 50 MG: 50 TABLET, FILM COATED ORAL at 05:15

## 2022-12-13 RX ADMIN — DOCUSATE SODIUM 100 MG: 100 CAPSULE, LIQUID FILLED ORAL at 05:18

## 2022-12-13 RX ADMIN — DEXAMETHASONE SODIUM PHOSPHATE 2 MG: 4 INJECTION, SOLUTION INTRA-ARTICULAR; INTRALESIONAL; INTRAMUSCULAR; INTRAVENOUS; SOFT TISSUE at 18:24

## 2022-12-13 RX ADMIN — ACETAMINOPHEN 1000 MG: 500 TABLET ORAL at 18:24

## 2022-12-13 RX ADMIN — ACETAMINOPHEN 1000 MG: 500 TABLET ORAL at 05:18

## 2022-12-13 ASSESSMENT — ENCOUNTER SYMPTOMS
COUGH: 0
ABDOMINAL PAIN: 1
PALPITATIONS: 0
NAUSEA: 0
CHILLS: 0
NERVOUS/ANXIOUS: 0
MYALGIAS: 0
INSOMNIA: 0
DIARRHEA: 0
DIZZINESS: 0
CONSTIPATION: 0
VOMITING: 0
HEADACHES: 0
FEVER: 0
SHORTNESS OF BREATH: 0

## 2022-12-13 NOTE — PROGRESS NOTES
Neurosurgery Progress Note    Subjective:  No events     Exam:  Sleeping but wakes to voice. A&O, speech fluent  EOM intact, facial symmetry  FS x4  Vision improving can see shapes and outlines   [place] CDI      BP  Min: 105/63  Max: 126/70  Pulse  Av.9  Min: 78  Max: 96  Resp  Av.2  Min: 16  Max: 18  Temp  Av.3 °C (99.2 °F)  Min: 36.6 °C (97.9 °F)  Max: 37.8 °C (100 °F)  Monitored Temp 2  Av.8 °C (100 °F)  Min: 37.8 °C (100 °F)  Max: 37.8 °C (100 °F)  SpO2  Av %  Min: 96 %  Max: 98 %    No data recorded    Recent Labs     22   WBC 17.3* 24.5* 23.0*   RBC 3.06* 3.33* 3.47*   HEMOGLOBIN 9.4* 10.3* 10.7*   HEMATOCRIT 26.8* 29.8* 30.3*   MCV 87.6 89.5 87.3   MCH 30.7 30.9 30.8   MCHC 35.1 34.6 35.3   RDW 44.5 44.6 43.8   PLATELETCT 100* 145* 162*   MPV 10.7 10.2 9.9       Recent Labs     22   SODIUM 137 135 129*   POTASSIUM 3.4* 4.1 4.1   CHLORIDE 99 97 95*   CO2 28 28 24   GLUCOSE 159* 157* 171*   BUN 15 17 17   CREATININE 0.62 0.66 0.55   CALCIUM 8.5 8.5 7.9*                   Intake/Output                         22 - 2259 22 - 2259      Total  Total                 Intake    P.O.  240  -- 240  --  -- --    P.O. 240 -- 240 -- -- --    Total Intake 240 -- 240 -- -- --       Output    Urine  --  400 400  --  -- --    Number of Times Voided -- 1 x 1 x -- -- --    Urine Void (mL) -- 400 400 -- -- --    Total Output -- 400 400 -- -- --       Net I/O     240 -400 -160 -- -- --              Intake/Output Summary (Last 24 hours) at 2022 0958  Last data filed at 2022  Gross per 24 hour   Intake 240 ml   Output 400 ml   Net -160 ml               aminophylline  100 mg Once PRN    simethicone  125 mg TID PRN    dexamethasone  2 mg Q12HRS    Followed by    [START ON 12/15/2022] dexamethasone  2 mg DAILY    insulin GLARGINE  5  Units Q EVENING    losartan  50 mg Q DAY    artificial tears  1 Application PRN    insulin regular  2-9 Units 4X/DAY ACHS    And    dextrose bolus  25 g Q15 MIN PRN    levETIRAcetam  500 mg BID    Pharmacy   PHARMACY TO DOSE    acetaminophen  1,000 mg Q6HRS    Pharmacy Consult Request  1 Each PHARMACY TO DOSE    MD ALERT...DO NOT ADMINISTER NSAIDS or ASPIRIN unless ORDERED By Neurosurgery  1 Each PRN    ondansetron  4 mg Q4HRS PRN    diphenhydrAMINE  25 mg Q6HRS PRN    scopolamine  1 Patch Q72HRS PRN    docusate sodium  100 mg BID    senna-docusate  1 Tablet Nightly    senna-docusate  1 Tablet Q24HRS PRN    polyethylene glycol/lytes  1 Packet BID PRN    magnesium hydroxide  30 mL QDAY PRN    bisacodyl  10 mg Q24HRS PRN    sodium phosphate  1 Each Once PRN    oxyCODONE immediate-release  5 mg Q3HRS PRN    Or    oxyCODONE immediate-release  10 mg Q3HRS PRN    Or    HYDROmorphone  0.5 mg Q3HRS PRN    diphenhydrAMINE  25 mg Q6HRS PRN    Or    diphenhydrAMINE  25 mg Q6HRS PRN    benzocaine-menthol  1 Lozenge Q2HRS PRN    Respiratory Therapy Consult   Continuous RT    ondansetron  4 mg Q4HRS PRN    promethazine  12.5-25 mg Q4HRS PRN    promethazine  12.5-25 mg Q4HRS PRN    prochlorperazine  5-10 mg Q4HRS PRN       Assessment and Plan:  Hospital day # 6  POD# 6 brain tumor, craniotomy for resection   Chemical prophylactic DVT therapy: yes  Start date/time: today if indicated    Ct head stable  MRI shows GTR small possible stroke nothing to cause the degree of vision loss likely swelling and shift continue to improve with vision   Keppra 500 BID  Dex taper  Waiting on dispo PMR recs

## 2022-12-13 NOTE — CARE PLAN
The patient is Stable - Low risk of patient condition declining or worsening    Shift Goals  Clinical Goals: Track Foreign body in abd  Patient Goals: manage pain  Family Goals: elizabeth    Progress made toward(s) clinical / shift goals:  Patient to go down for repeat abdominal xray to track foreign body. Patient currently sleeping. No s/s of pain. Patient has very poor PO intake consuming 0% of meals over last few days. Will continue to monitor.

## 2022-12-13 NOTE — DISCHARGE PLANNING
Received Choice Form @: 1427 12/12/22  Agency/ Facility Name: [facility]   Referral Sent per Choice Form @: 0739 12/13/22 Just received orders & F2F      0845    Agency/Facility Name: [facility]   Spoke To: Betsy  Outcome: DPA received call from  agency. Per Betsy, pt is declined as pt has no PCP. Betsy stated that pt can get established with a PCP and and the PCP can send a referral to  agency.      RN ULANA notified

## 2022-12-14 ENCOUNTER — APPOINTMENT (OUTPATIENT)
Dept: RADIOLOGY | Facility: MEDICAL CENTER | Age: 57
DRG: 025 | End: 2022-12-14
Attending: INTERNAL MEDICINE
Payer: COMMERCIAL

## 2022-12-14 LAB
ALBUMIN SERPL BCP-MCNC: 3 G/DL (ref 3.2–4.9)
ALBUMIN/GLOB SERPL: 1.4 G/DL
ALP SERPL-CCNC: 63 U/L (ref 30–99)
ALT SERPL-CCNC: 26 U/L (ref 2–50)
ANION GAP SERPL CALC-SCNC: 11 MMOL/L (ref 7–16)
AST SERPL-CCNC: 12 U/L (ref 12–45)
BILIRUB SERPL-MCNC: 0.7 MG/DL (ref 0.1–1.5)
BUN SERPL-MCNC: 14 MG/DL (ref 8–22)
CALCIUM ALBUM COR SERPL-MCNC: 9 MG/DL (ref 8.5–10.5)
CALCIUM SERPL-MCNC: 8.2 MG/DL (ref 8.5–10.5)
CHLORIDE SERPL-SCNC: 96 MMOL/L (ref 96–112)
CO2 SERPL-SCNC: 24 MMOL/L (ref 20–33)
CREAT SERPL-MCNC: 0.55 MG/DL (ref 0.5–1.4)
ERYTHROCYTE [DISTWIDTH] IN BLOOD BY AUTOMATED COUNT: 46.5 FL (ref 35.9–50)
GFR SERPLBLD CREATININE-BSD FMLA CKD-EPI: 116 ML/MIN/1.73 M 2
GLOBULIN SER CALC-MCNC: 2.2 G/DL (ref 1.9–3.5)
GLUCOSE SERPL-MCNC: 165 MG/DL (ref 65–99)
HCT VFR BLD AUTO: 32 % (ref 42–52)
HGB BLD-MCNC: 10.9 G/DL (ref 14–18)
LACTATE SERPL-SCNC: 1.9 MMOL/L (ref 0.5–2)
MAGNESIUM SERPL-MCNC: 2 MG/DL (ref 1.5–2.5)
MCH RBC QN AUTO: 30.4 PG (ref 27–33)
MCHC RBC AUTO-ENTMCNC: 34.1 G/DL (ref 33.7–35.3)
MCV RBC AUTO: 89.4 FL (ref 81.4–97.8)
PHOSPHATE SERPL-MCNC: 2.4 MG/DL (ref 2.5–4.5)
PLATELET # BLD AUTO: 161 K/UL (ref 164–446)
PMV BLD AUTO: 10.7 FL (ref 9–12.9)
POTASSIUM SERPL-SCNC: 3.6 MMOL/L (ref 3.6–5.5)
PROT SERPL-MCNC: 5.2 G/DL (ref 6–8.2)
RBC # BLD AUTO: 3.58 M/UL (ref 4.7–6.1)
SODIUM SERPL-SCNC: 131 MMOL/L (ref 135–145)
TROPONIN T SERPL-MCNC: 12 NG/L (ref 6–19)
TROPONIN T SERPL-MCNC: 14 NG/L (ref 6–19)
WBC # BLD AUTO: 21.7 K/UL (ref 4.8–10.8)

## 2022-12-14 PROCEDURE — 80069 RENAL FUNCTION PANEL: CPT

## 2022-12-14 PROCEDURE — 84484 ASSAY OF TROPONIN QUANT: CPT

## 2022-12-14 PROCEDURE — 85027 COMPLETE CBC AUTOMATED: CPT | Mod: 91

## 2022-12-14 PROCEDURE — 83735 ASSAY OF MAGNESIUM: CPT

## 2022-12-14 PROCEDURE — 36415 COLL VENOUS BLD VENIPUNCTURE: CPT

## 2022-12-14 PROCEDURE — 80053 COMPREHEN METABOLIC PANEL: CPT

## 2022-12-14 PROCEDURE — 74018 RADEX ABDOMEN 1 VIEW: CPT

## 2022-12-14 PROCEDURE — 97535 SELF CARE MNGMENT TRAINING: CPT | Mod: CO

## 2022-12-14 PROCEDURE — 700111 HCHG RX REV CODE 636 W/ 250 OVERRIDE (IP): Performed by: PHYSICIAN ASSISTANT

## 2022-12-14 PROCEDURE — 770020 HCHG ROOM/CARE - TELE (206)

## 2022-12-14 PROCEDURE — 99233 SBSQ HOSP IP/OBS HIGH 50: CPT | Performed by: INTERNAL MEDICINE

## 2022-12-14 PROCEDURE — 84100 ASSAY OF PHOSPHORUS: CPT

## 2022-12-14 PROCEDURE — 83605 ASSAY OF LACTIC ACID: CPT

## 2022-12-14 RX ADMIN — ONDANSETRON 4 MG: 2 INJECTION INTRAMUSCULAR; INTRAVENOUS at 10:15

## 2022-12-14 ASSESSMENT — ENCOUNTER SYMPTOMS
DIZZINESS: 0
HEADACHES: 0
NERVOUS/ANXIOUS: 0
ABDOMINAL PAIN: 1
CONSTIPATION: 0
FEVER: 0
MYALGIAS: 0
SHORTNESS OF BREATH: 0
INSOMNIA: 0
NAUSEA: 0
COUGH: 0
DIARRHEA: 0
VOMITING: 0
PALPITATIONS: 0
CHILLS: 0

## 2022-12-14 ASSESSMENT — COGNITIVE AND FUNCTIONAL STATUS - GENERAL
TOILETING: A LITTLE
DRESSING REGULAR LOWER BODY CLOTHING: A LITTLE
HELP NEEDED FOR BATHING: A LITTLE
DAILY ACTIVITIY SCORE: 21
SUGGESTED CMS G CODE MODIFIER DAILY ACTIVITY: CJ

## 2022-12-14 NOTE — PROGRESS NOTES
Hospital Medicine Daily Progress Note    Date of Service  12/13/2022    Chief Complaint  Ambrosio Hardy is a 57 y.o. male admitted 11/30/2022 with headache    Hospital Course  Ambrosio Hardy is a 57 y.o. male who presented 11/30/2022 with headaches. This is a pleasant gentleman with a history of hypertension who presented with worsening headaches for 2 months.  The headache was moderate to severe.  Patient did not have any focal weaknesses or paresthesias.  He did have intermittent blurry vision although on presentation he was not complaining of any significant vision problems.  He presented to [facility] in [place], where a head CT showed possible occipital mass with midline shift.  Patient was subsequently transferred to [facility] for a neurosurgical consultation.  The MRI showed a large occipital homogeneously enhancing mass consistent with a meningioma.  Neurosurgery was consulted and the patient was started on dexamethasone due to cerebral edema secondary to brain compression from the tumor.    The patient underwent embolization of large feeders to the meningioma from the right occipital artery, right MMA, and left MMA using 150-300 UM particles and Gelfoam by Dr. Mcdonald of interventional radiology on 12/5/2022.  Patient subsequently underwent resection of the meningioma on 12/7/2022 by Dr. Dieudonne Donis. There was large amount of blood loss requiring multiple units of blood transfusion including 4 units of PRBCs and 4 units of FFP.  Patient was readmitted to the intensive care unit intubated.  He was extubated on 12/8/2022.    Post patient developed some inability to perceive light, which was concerning for a occipital stroke versus brain shift.  MRI of the brain showed a small right posterior cerebral artery stroke.    On 12/10/2022, patient developed some stomach discomfort associated with dynamic T wave inversions on EKG.  Troponins were  negative x3.  Echocardiogram showed an ejection fraction of 60 to 65% with no regional wall abnormalities.  Enlarged right ventricle was noted.  Patient underwent a MPI stress test.        Interval Problem Update  12/10: Medicine had previously signed off.  However, notified of T wave inversions on EKG.  Patient currently denies any chest pain although he reports some discomfort in his stomach.  Initial troponin was negative.  I have ordered 2 additional troponins.  No nausea vomiting.  Patient is status post resection of meningioma postop day #3.  He denies history of diabetes.    12/11: No significant events overnight.  No more stomach discomfort overnight.  Repeat EKG yesterday showing stable T wave inversions.  Troponins negative x3.  Echocardiogram showing normal ejection fraction of 65%.  No significant valvular disease.  Large right ventricular size.  MPI stress test ordered.  Unfortunately patient ate and had coffee this morning so would not be able to go until tomorrow.  Awaiting physiatry consultation.  Planning to discharge to inpatient acute rehabilitation versus skilled nursing facility.      Personally reviewed the MRI of the brain with and without contrast performed on 12/10/2022.  Per my read no remaining enhancing mass in the left occipital area.  There is increased DWI signal in the right occipital area consistent with a small PCA stroke.  There is also increased T2 signal consistent with mild cerebral edema.      I personally reviewed the EKG below.  Per my read normal sinus rhythm with heart rate of 74, QTC of 460, T wave inversions in V1, V2, V3, which are stable.                12/12/2022  No significant events overnight.  Patient continues to have abdominal pain.  Abdominal x-ray showing a foreign body that is moving along the lower GI tract.  Stress testing negative.  No further intervention or work-up of chest pain indicated at this time.  Due to patient's [employer] insurance, unlikely to be  accepted to local skilled nursing facility.  Will be discharged with home health likely tomorrow once the foreign body passes through his GI tract.  Abdominal x-ray for tomorrow has been ordered.    Abdominal x-ray per my read shows a foreign body within the lower GI tract that is moving on subsequent x-ray.  12/13. AXR came back, the metallic object migrated to decending colon now. Patient denies hat happened. Even little sister got curious    I have discussed this patient's plan of care and discharge plan with bedside nurse and case management.    Consultants/Specialty  neurosurgery    Code Status  Full Code    Disposition  Patient is not medically cleared for discharge.   Anticipate discharge to to an inpatient rehabilitation hospital versus skilled nursing facility.  I have placed the appropriate orders for post-discharge needs.    Review of Systems  Review of Systems   Constitutional:  Positive for malaise/fatigue. Negative for chills and fever.   Respiratory:  Negative for cough and shortness of breath.    Cardiovascular:  Negative for chest pain and palpitations.   Gastrointestinal:  Positive for abdominal pain. Negative for constipation, diarrhea, nausea and vomiting.   Genitourinary:  Negative for dysuria and hematuria.   Musculoskeletal:  Negative for joint pain and myalgias.   Neurological:  Negative for dizziness and headaches.   Psychiatric/Behavioral:  The patient is not nervous/anxious and does not have insomnia.       Physical Exam  Temp:  [37.3 °C (99.1 °F)-37.8 °C (100 °F)] 37.3 °C (99.1 °F)  Pulse:  [] 82  Resp:  [16-18] 16  BP: (100-118)/(58-69) 100/58  SpO2:  [96 %-98 %] 98 %    Physical Exam  Vitals and nursing note reviewed.   Constitutional:       General: He is not in acute distress.     Appearance: Normal appearance. He is not ill-appearing.   HENT:      Head: Normocephalic and atraumatic.      Comments: Craniotomy incision clean dry and intact     Mouth/Throat:      Mouth: Mucous  membranes are moist.      Pharynx: Oropharynx is clear. No oropharyngeal exudate.   Eyes:      General: No scleral icterus.        Right eye: No discharge.         Left eye: No discharge.      Conjunctiva/sclera: Conjunctivae normal.   Cardiovascular:      Rate and Rhythm: Normal rate and regular rhythm.      Pulses: Normal pulses.      Heart sounds: Normal heart sounds. No murmur heard.  Pulmonary:      Effort: Pulmonary effort is normal. No respiratory distress.      Breath sounds: Normal breath sounds.   Abdominal:      General: Abdomen is flat. Bowel sounds are normal. There is no distension.      Palpations: Abdomen is soft.   Musculoskeletal:         General: No swelling.      Cervical back: Neck supple. No tenderness.      Right lower leg: No edema.      Left lower leg: No edema.   Skin:     General: Skin is warm and dry.      Coloration: Skin is not pale.   Neurological:      Mental Status: He is alert. Mental status is at baseline.      Motor: Weakness (Diffuse weakness in all 4 extremities) present.      Comments: Sleeping  COgnitive deficits vs odd behavior   Psychiatric:         Thought Content: Thought content normal.         Judgment: Judgment normal.      Comments: Odd behavior       Fluids    Intake/Output Summary (Last 24 hours) at 12/13/2022 1657  Last data filed at 12/12/2022 2013  Gross per 24 hour   Intake --   Output 400 ml   Net -400 ml         Laboratory  Recent Labs     12/11/22  0345 12/12/22  0112 12/13/22  0419   WBC 17.3* 24.5* 23.0*   RBC 3.06* 3.33* 3.47*   HEMOGLOBIN 9.4* 10.3* 10.7*   HEMATOCRIT 26.8* 29.8* 30.3*   MCV 87.6 89.5 87.3   MCH 30.7 30.9 30.8   MCHC 35.1 34.6 35.3   RDW 44.5 44.6 43.8   PLATELETCT 100* 145* 162*   MPV 10.7 10.2 9.9       Recent Labs     12/11/22  0345 12/12/22  0112 12/13/22  0419   SODIUM 137 135 129*   POTASSIUM 3.4* 4.1 4.1   CHLORIDE 99 97 95*   CO2 28 28 24   GLUCOSE 159* 157* 171*   BUN 15 17 17   CREATININE 0.62 0.66 0.55   CALCIUM 8.5 8.5 7.9*                        Imaging  [id number] VIEW   Final Result      Linear metallic structure projects over the left abdomen, possibly in the proximal descending colon.      [id number]-5 VIEW   Final Result      Short segment of wire-like radiopaque foreign body has migrated over to the right midabdomen. Presumably transit within small bowel.      NM-CARDIAC STRESS TEST   Final Result      [id number] VIEW   Final Result      1.  Nonobstructive bowel gas pattern. Small amount of stool throughout the colon.   2.  A 2.2 cm linear radiopaque object projecting over the left lower abdomen may represent a foreign body.      MR-BRAIN-WITH & W/O   Final Result      1.  Postoperative bilateral parietal craniectomy and cranioplasty with gross total resection of a large bilateral posterior parietal meningioma. Expected postoperative changes with some hemorrhage along the posterior falx and left parietal and occipital    lobes at the operative site. Some surrounding cytotoxic edema in the brain parenchyma with a globoid area of bright signal in the right lateral occipital-parietal lobe which may represent an area of postoperative infarct. Minimal residual enhancement    along the falx.   2.  Small left hemisphere subdural hygroma.      EC-ECHOCARDIOGRAM COMPLETE W/O CONT   Final Result      DX-CHEST-PORTABLE (1 VIEW)   Final Result         1.  Interstitial pulmonary parenchymal prominence, compatible with interstitial edema and/or infiltrates, slightly more pronounced compared to prior study.   2.  Cardiomegaly      DX-CHEST-PORTABLE (1 VIEW)   Final Result         1.  Interstitial pulmonary parenchymal prominence, compatible with interstitial edema and/or infiltrates.      CT-HEAD W/O   Final Result         1.  Streaky hyperdense hemorrhage and pneumocephalus within occipital surgical bed, within expected limits for recent postop changes.   2.  Atherosclerosis.         DX-CHEST-PORTABLE (1 VIEW)   Final Result         1.  No  acute cardiopulmonary disease.      DX-CHEST-PORTABLE (1 VIEW)   Final Result      No acute cardiopulmonary abnormality.      IR-EMBOLIZE-NEURO-INTRACRANIAL   Final Result   Impression:      57-year-old patient with a large bilateral midline parieto-occipital meningioma underwent cerebral angiography and embolization as described above.      Tumor blush with feeders from the right middle meningeal, left middle meningeal, right occipital artery were embolized to occlusion using a combination of particles and Gelfoam.      Additional leptomeningeal feeders to the meningioma arise from left MCA, DENISSE, PCA.      A small arterial venous shunt is also noted within the lesion arising from small branches of the left posterior cerebral artery.      Occlusion of the posterior segment of the superior sagittal sinus is demonstrated due to invasion by the tumor. Multiple collateral veins have developed around the occluded segment with predominant anterior drainage into the superior anastomotic veins.      IAdelina was physically present and participated during the entire procedure of the IR-EMBOLIZE-NEURO-INTRACRANIAL.      MR-STEALTH BRAIN WITH & W/O   Final Result      There is an approximately 6.7 x 4.7 x 5.9 cm sized enhancing extra-axial mass noted in the bilateral mid posterior parietal region along the both sides of posterior falx cerebri. The superior sagittal sinus is infiltrated and surrounded by this tumor.    There is also abnormal adjacent dural enhancement .The adjacent parietal/occipital bones are infiltrated by this tumor. There is also hyperostosis of the calvarial surface of the bones with enhancement. There are multiple enlarged blood vessels are noted    along the anterior margin of the lesion. There is diffuse white matter edema noted in the bilateral parietal and occipital lobes surrounding the lesion.There is an approximately 6 mm midline shift towards right side. Findings likely represent     infiltrating vascular high-grade meningioma with superior sagittal sinus and bone infiltration. The other differential diagnosis includes hemangiopericytoma and dural based metastasis. Findings were discussed with SUSY PRATT on 12/1/2022 7:43 AM.      MR-BRAIN-WITH & W/O   Final Result      There is an approximately 6.7 x 4.7 x 5.9 cm sized enhancing extra-axial mass noted in the bilateral mid posterior parietal region along the both sides of posterior falx cerebri. The superior sagittal sinus is infiltrated and surrounded by this tumor.    There is also abnormal adjacent dural enhancement .The adjacent parietal/occipital bones are infiltrated by this tumor. There is also hyperostosis of the calvarial surface of the bones with enhancement. There are multiple enlarged blood vessels are noted    along the anterior margin of the lesion. There is diffuse white matter edema noted in the bilateral parietal and occipital lobes surrounding the lesion.There is an approximately 6 mm midline shift towards right side. 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 The other differential diagnosis includes hemangiopericytoma and dural based metastasis. Findings were discussed with SUSY PRATT on 12/1/2022 7:43 AM.      CT-CHEST,ABDOMEN,PELVIS WITH   Final Result         1.  No significant abnormality in thorax, abdomen and pelvis CT scan.   2.  Hepatomegaly   3.  Cholelithiasis   4.  Segment 2 duodenal diverticula   5.  Atherosclerosis   6.  Fat-containing left inguinal hernia   7.  Fat-containing umbilical hernia   8.  Diverticulosis   9.  Main pulmonary artery dilatation, appearance suggests changes related to pulmonary artery aneurysm or pulmonary hypertension.             Assessment/Plan       * Cerebral edema, meningioma, postprocedure CVA, foreign body GI (HCC)- (present on admission)  Assessment & Plan  Associated with brain  "compression secondary to meningioma.  Patient also has postoperative cerebral edema.    12/11/2022  Continue dexamethasone taper as per neurosurgery\"    Dexamethasone    FB GI (foreign body in gastrointestinal tract), initial encounter- (present on admission)  Assessment & Plan       12/12/2022  Unidentified object in GI tract.  Continue serial abdominal x-rays until cleared.\"    Repeat AXR tomorrow   If it passes and has abdominal pain, then get repeat CT    Abdominal pain- (present on admission)  Assessment & Plan  Etiology unclear.  Possibly related to foreign body.    Continue serial abdominal x-rays.    Leukocytosis  Assessment & Plan  Likely secondary to steroids.  No signs of infection at this time.  Infectious work-up and prelim antibiotics as WNC up to 23K    Postoperative cerebrovascular infarction following non-cardiac surgery (HCC)- (present on admission)  Assessment & Plan  12/11/2022:  MRI of the brain showing right PCA stroke following resection of the meningioma.  No need for aspirin at this time.    Acute ischemic right PCA stroke (HCC)- (present on admission)  Assessment & Plan  12/11/2022:  MRI showing perioperative right PCA stroke.  Likely cause of vision deficit.    Fall precautions  PT and OT consults    Hypertension- (present on admission)  Assessment & Plan  12/10/2022:  Blood pressures controlled.  Goal of less than 130/80.  Resume home losartan at lower dose of 50 mg daily.    12/11/2022:  Blood pressures well controlled.  Continuing on losartan 50 mg daily.\"    Vitals:    12/13/22 1600   BP: 120/68   Pulse: 87   Resp: 18   Temp: 37 °C (98.6 °F)   SpO2: 97%     Stable.      I have performed a physical exam and reviewed and updated ROS and Plan today (12/13/2022). In review of yesterday's note (12/12/2022), there are no changes except as documented above.    VTE prophylaxis:     SCDs/TEDs    "

## 2022-12-14 NOTE — PROGRESS NOTES
Monitor Summary: SR 80-92, MD .0.17, QRS .0.08, QT .0.39 with rare PVCs per strip from monitor room

## 2022-12-14 NOTE — CARE PLAN
The patient is Stable - Low risk of patient condition declining or worsening    Shift Goals  Clinical Goals: Track Foreign body in abd  Patient Goals: manage pain  Family Goals: elizabeth    Progress made toward(s) clinical / shift goals:      Problem: Fall Risk  Goal: Patient will remain free from falls  Outcome: Progressing     Problem: Pain - Standard  Goal: Alleviation of pain or a reduction in pain to the patient’s comfort goal  Outcome: Progressing     Problem: Neuro Status  Goal: Neuro status will remain stable or improve  Outcome: Progressing       Patient is not progressing towards the following goals: refusing treatments and care during NOC shift, on-call hospitalist notified and aware.       Problem: Knowledge Deficit - Standard  Goal: Patient and family/care givers will demonstrate understanding of plan of care, disease process/condition, diagnostic tests and medications  Outcome: Not Progressing

## 2022-12-14 NOTE — THERAPY
"Occupational Therapy  Daily Treatment     Patient Name: Ambrosio Hardy  Age:  57 y.o., Sex:  male  Medical Record #: [id number]  Today's Date: 12/14/2022       Precautions: Fall Risk  Comments: visual impairments post-op    Assessment    Pt seen for OT tx. Pt grossly limited by visual impairments impacting ability to complete ADLs and ADL transfers independently. Pt able to amb > BR w/ CGA for safety d/t visual impairments but did not require physical assist for balance. During ADLs pt required more VC than physical assist d/t difficulty navigating area as this is an unfamiliar environment. Pt perseverating on how he wants to go home and how he wants his family to help him. Educated pt about d/c planning and encouraged pt to mobilize w/ nrsg staff. Pt also stating that he wants to amb more but when asking pt if he would like to amb in hallway w/ this therapist he declined. Pt required cues for redirection d/t perseverating on wanting to go home and about his stomach hurting. When telling pt that this therapist will notify RN about medications. Pt stating \"they give me the same stuff, they don't give me the good stuff.\" Pt reports that family will be able to assist him at home. Pt requiring CGA for amb for navigating and avoiding obstacles but reports family will be able to assist w/ this at upon d/c.     Plan    Will discuss POC w/ OT regarding goals and frequency.    DC Equipment Recommendations: None  Discharge Recommendations: Other - If post-acute placement is not obtainable, the patient may be able to return home w/ family support. See grid below for details on the patient's current level of assistance.         12/14/22 1015   Cognition    Cognition / Consciousness X   Attention Impaired   Sequencing Impaired   Initiation Impaired   Comments perseverating on wanting to walk and reports that he doesn't feel anyone is helping him.   Balance   Sitting Balance (Static) Good   Sitting Balance (Dynamic) Fair + "   Standing Balance (Static) Fair -   Standing Balance (Dynamic) Fair -   Weight Shift Sitting Fair   Weight Shift Standing Fair   Bed Mobility    Supine to Sit Supervised   Sit to Supine Supervised   Activities of Daily Living   Grooming Standing  (does not require assist for balance but required cues to find soap and paper towel d/t visual impairments)   Lower Body Dressing Supervision   Toileting Supervision   Functional Mobility   Sit to Stand Contact Guard Assist   Bed, Chair, Wheelchair Transfer Contact Guard Assist   Toilet Transfers Contact Guard Assist   Short Term Goals   Short Term Goal # 1 pt will demo toilet txf with supv   Goal Outcome # 1 Other (see comments)  (able to complete transfer w/o physical assist but requires VC d/t unfamiliar environment)   Short Term Goal # 2 pt will navigate to BR w/ min cueing and supv   Goal Outcome # 2 Other (see comments)  (pt perseverating and difficult to redirect but able to amb w/ CGA d/t unfamiliar environment)   Short Term Goal # 3 pt will dress LB with supv   Goal Outcome # 3 Goal met   Short Term Goal # 4 pt will groom at the sink with supv   Goal Outcome # 4 Other (see comments)  (did not need physical assist while standing at the sink and required VC to locate soap and paper towel d/t visual impairments and unfamiliar environment)   Anticipated Discharge Equipment and Recommendations   DC Equipment Recommendations None   Discharge Recommendations Other -

## 2022-12-14 NOTE — CARE PLAN
The patient is Stable - Low risk of patient condition declining or worsening    Shift Goals  Clinical Goals: Compliance  Patient Goals: Discharge  Family Goals: elizabeth    Progress made toward(s) clinical / shift goals:  Started GoLytly. X1 large loose BM so far. After BM abd pain has subsided.

## 2022-12-14 NOTE — PROGRESS NOTES
Monitor Summary: SR 75-79, MS 0.16, QRS 0.09, QT 0.43, with rare PVCs per strip from monitor room.

## 2022-12-14 NOTE — PROGRESS NOTES
"Neurosurgery Progress Note    Subjective:  No events, pt refusing lab draws  \"I just want to go home\"  Vision improved    Exam:  Sleeping but wakes to voice. A&O, speech fluent  EOM intact, facial symmetry  FS x4  Vision improving can see shapes and outlines, reported correct # of fingers held up  Staples CDI      BP  Min: 109/57  Max: 120/68  Pulse  Av.4  Min: 79  Max: 110  Resp  Av  Min: 18  Max: 18  Temp  Av °C (98.6 °F)  Min: 36.8 °C (98.2 °F)  Max: 37.2 °C (99 °F)  Monitored Temp 2  Av °C (98.6 °F)  Min: 37 °C (98.6 °F)  Max: 37 °C (98.6 °F)  SpO2  Av.3 %  Min: 92 %  Max: 97 %    No data recorded    Recent Labs     22  01122  1033   WBC 24.5* 23.0* 21.7*   RBC 3.33* 3.47* 3.58*   HEMOGLOBIN 10.3* 10.7* 10.9*   HEMATOCRIT 29.8* 30.3* 32.0*   MCV 89.5 87.3 89.4   MCH 30.9 30.8 30.4   MCHC 34.6 35.3 34.1   RDW 44.6 43.8 46.5   PLATELETCT 145* 162* 161*   MPV 10.2 9.9 10.7       Recent Labs     22  01122  1033   SODIUM 135 129* 131*   POTASSIUM 4.1 4.1 3.6   CHLORIDE 97 95* 96   CO2 28 24 24   GLUCOSE 157* 171* 165*   BUN 17 17 14   CREATININE 0.66 0.55 0.55   CALCIUM 8.5 7.9* 8.2*       Recent Labs     22  2050   INR 1.18*             Intake/Output                         22 07 - 22 0659 22 07 - 12/15/22 0659     [id number] Total  Total                 Intake    Total Intake -- -- -- -- -- --       Output    Stool  --  -- --  --  -- --    Number of Times Stooled -- -- -- 1 x -- 1 x    Total Output -- -- -- -- -- --       Net I/O     -- -- -- -- -- --            No intake or output data in the 24 hours ending 22 7174            polyethylene glycol-electrolytes  2 L Once    cefTRIAXone (ROCEPHIN) IV  2,000 mg Q24HRS    aminophylline  100 mg Once PRN    simethicone  125 mg TID PRN    dexamethasone  2 mg Q12HRS    Followed by    [START ON 12/15/2022] dexamethasone  2 mg DAILY "    insulin GLARGINE  5 Units Q EVENING    losartan  50 mg Q DAY    artificial tears  1 Application PRN    insulin regular  2-9 Units 4X/DAY ACHS    And    dextrose bolus  25 g Q15 MIN PRN    levETIRAcetam  500 mg BID    Pharmacy   PHARMACY TO DOSE    acetaminophen  1,000 mg Q6HRS    Pharmacy Consult Request  1 Each PHARMACY TO DOSE    MD ALERT...DO NOT ADMINISTER NSAIDS or ASPIRIN unless ORDERED By Neurosurgery  1 Each PRN    ondansetron  4 mg Q4HRS PRN    diphenhydrAMINE  25 mg Q6HRS PRN    scopolamine  1 Patch Q72HRS PRN    docusate sodium  100 mg BID    senna-docusate  1 Tablet Nightly    senna-docusate  1 Tablet Q24HRS PRN    polyethylene glycol/lytes  1 Packet BID PRN    magnesium hydroxide  30 mL QDAY PRN    bisacodyl  10 mg Q24HRS PRN    sodium phosphate  1 Each Once PRN    oxyCODONE immediate-release  5 mg Q3HRS PRN    Or    oxyCODONE immediate-release  10 mg Q3HRS PRN    Or    HYDROmorphone  0.5 mg Q3HRS PRN    diphenhydrAMINE  25 mg Q6HRS PRN    Or    diphenhydrAMINE  25 mg Q6HRS PRN    benzocaine-menthol  1 Lozenge Q2HRS PRN    Respiratory Therapy Consult   Continuous RT    ondansetron  4 mg Q4HRS PRN    promethazine  12.5-25 mg Q4HRS PRN    promethazine  12.5-25 mg Q4HRS PRN    prochlorperazine  5-10 mg Q4HRS PRN       Assessment and Plan:  Hospital day # 7  POD# 7 brain tumor, craniotomy for resection   Chemical prophylactic DVT therapy: yes  Start date/time: today if indicated    Ct head stable  MRI shows GTR small possible stroke nothing to cause the degree of vision loss likely swelling and shift continue to improve with vision   Keppra 500 BID, 7d completed today  Dex taper completed today  Path demonstrates meningioma WHO grade II   Rad onc outpt consult placed to Dr Huitron  Therapy recommending rehab, unable to arrange d/t insurance issues  Anticipate discharge home with HH when medically cleared    Staples out POD#14, f/up at [facility]  F/up with Dr Huitron, [facility] onc  Nsgy will  sign off, no active needs. Please call with questions or concerns

## 2022-12-15 ENCOUNTER — PHARMACY VISIT (OUTPATIENT)
Dept: PHARMACY | Facility: MEDICAL CENTER | Age: 57
End: 2022-12-15
Payer: COMMERCIAL

## 2022-12-15 ENCOUNTER — APPOINTMENT (OUTPATIENT)
Dept: RADIOLOGY | Facility: MEDICAL CENTER | Age: 57
DRG: 025 | End: 2022-12-15
Attending: INTERNAL MEDICINE
Payer: COMMERCIAL

## 2022-12-15 VITALS
DIASTOLIC BLOOD PRESSURE: 54 MMHG | WEIGHT: 200.18 LBS | TEMPERATURE: 98.2 F | HEIGHT: 70 IN | SYSTOLIC BLOOD PRESSURE: 107 MMHG | RESPIRATION RATE: 18 BRPM | OXYGEN SATURATION: 100 % | BODY MASS INDEX: 28.66 KG/M2 | HEART RATE: 75 BPM

## 2022-12-15 LAB
ALBUMIN SERPL BCP-MCNC: 2.6 G/DL (ref 3.2–4.9)
ALBUMIN SERPL BCP-MCNC: 2.7 G/DL (ref 3.2–4.9)
ALBUMIN/GLOB SERPL: 1.4 G/DL
ALP SERPL-CCNC: 56 U/L (ref 30–99)
ALT SERPL-CCNC: 21 U/L (ref 2–50)
ANION GAP SERPL CALC-SCNC: 9 MMOL/L (ref 7–16)
AST SERPL-CCNC: 11 U/L (ref 12–45)
BILIRUB SERPL-MCNC: 0.5 MG/DL (ref 0.1–1.5)
BUN SERPL-MCNC: 11 MG/DL (ref 8–22)
BUN SERPL-MCNC: 12 MG/DL (ref 8–22)
CALCIUM ALBUM COR SERPL-MCNC: 8.7 MG/DL (ref 8.5–10.5)
CALCIUM ALBUM COR SERPL-MCNC: 8.7 MG/DL (ref 8.5–10.5)
CALCIUM SERPL-MCNC: 7.6 MG/DL (ref 8.5–10.5)
CALCIUM SERPL-MCNC: 7.7 MG/DL (ref 8.5–10.5)
CHLORIDE SERPL-SCNC: 95 MMOL/L (ref 96–112)
CHLORIDE SERPL-SCNC: 97 MMOL/L (ref 96–112)
CO2 SERPL-SCNC: 28 MMOL/L (ref 20–33)
CO2 SERPL-SCNC: 28 MMOL/L (ref 20–33)
CREAT SERPL-MCNC: 0.48 MG/DL (ref 0.5–1.4)
CREAT SERPL-MCNC: 0.49 MG/DL (ref 0.5–1.4)
ERYTHROCYTE [DISTWIDTH] IN BLOOD BY AUTOMATED COUNT: 47.7 FL (ref 35.9–50)
ERYTHROCYTE [DISTWIDTH] IN BLOOD BY AUTOMATED COUNT: 48.5 FL (ref 35.9–50)
GFR SERPLBLD CREATININE-BSD FMLA CKD-EPI: 120 ML/MIN/1.73 M 2
GFR SERPLBLD CREATININE-BSD FMLA CKD-EPI: 120 ML/MIN/1.73 M 2
GLOBULIN SER CALC-MCNC: 1.9 G/DL (ref 1.9–3.5)
GLUCOSE BLD STRIP.AUTO-MCNC: 134 MG/DL (ref 65–99)
GLUCOSE BLD STRIP.AUTO-MCNC: 151 MG/DL (ref 65–99)
GLUCOSE BLD STRIP.AUTO-MCNC: 185 MG/DL (ref 65–99)
GLUCOSE SERPL-MCNC: 152 MG/DL (ref 65–99)
GLUCOSE SERPL-MCNC: 152 MG/DL (ref 65–99)
HCT VFR BLD AUTO: 25.6 % (ref 42–52)
HCT VFR BLD AUTO: 27.2 % (ref 42–52)
HGB BLD-MCNC: 8.9 G/DL (ref 14–18)
HGB BLD-MCNC: 9.3 G/DL (ref 14–18)
MAGNESIUM SERPL-MCNC: 2 MG/DL (ref 1.5–2.5)
MCH RBC QN AUTO: 30.8 PG (ref 27–33)
MCH RBC QN AUTO: 31.2 PG (ref 27–33)
MCHC RBC AUTO-ENTMCNC: 34.2 G/DL (ref 33.7–35.3)
MCHC RBC AUTO-ENTMCNC: 34.8 G/DL (ref 33.7–35.3)
MCV RBC AUTO: 89.8 FL (ref 81.4–97.8)
MCV RBC AUTO: 90.1 FL (ref 81.4–97.8)
PHOSPHATE SERPL-MCNC: 2.9 MG/DL (ref 2.5–4.5)
PHOSPHATE SERPL-MCNC: 2.9 MG/DL (ref 2.5–4.5)
PLATELET # BLD AUTO: 155 K/UL (ref 164–446)
PLATELET # BLD AUTO: 176 K/UL (ref 164–446)
PMV BLD AUTO: 9.7 FL (ref 9–12.9)
PMV BLD AUTO: 9.9 FL (ref 9–12.9)
POTASSIUM SERPL-SCNC: 3.1 MMOL/L (ref 3.6–5.5)
POTASSIUM SERPL-SCNC: 3.2 MMOL/L (ref 3.6–5.5)
PROT SERPL-MCNC: 4.6 G/DL (ref 6–8.2)
RBC # BLD AUTO: 2.85 M/UL (ref 4.7–6.1)
RBC # BLD AUTO: 3.02 M/UL (ref 4.7–6.1)
SODIUM SERPL-SCNC: 132 MMOL/L (ref 135–145)
SODIUM SERPL-SCNC: 133 MMOL/L (ref 135–145)
TROPONIN T SERPL-MCNC: 11 NG/L (ref 6–19)
TROPONIN T SERPL-MCNC: 13 NG/L (ref 6–19)
TROPONIN T SERPL-MCNC: 14 NG/L (ref 6–19)
TROPONIN T SERPL-MCNC: 16 NG/L (ref 6–19)
WBC # BLD AUTO: 14.9 K/UL (ref 4.8–10.8)
WBC # BLD AUTO: 15.5 K/UL (ref 4.8–10.8)

## 2022-12-15 PROCEDURE — A9270 NON-COVERED ITEM OR SERVICE: HCPCS | Performed by: PHYSICIAN ASSISTANT

## 2022-12-15 PROCEDURE — 97530 THERAPEUTIC ACTIVITIES: CPT

## 2022-12-15 PROCEDURE — 74018 RADEX ABDOMEN 1 VIEW: CPT

## 2022-12-15 PROCEDURE — A9270 NON-COVERED ITEM OR SERVICE: HCPCS | Performed by: INTERNAL MEDICINE

## 2022-12-15 PROCEDURE — 85027 COMPLETE CBC AUTOMATED: CPT

## 2022-12-15 PROCEDURE — 700102 HCHG RX REV CODE 250 W/ 637 OVERRIDE(OP): Performed by: STUDENT IN AN ORGANIZED HEALTH CARE EDUCATION/TRAINING PROGRAM

## 2022-12-15 PROCEDURE — 700111 HCHG RX REV CODE 636 W/ 250 OVERRIDE (IP): Performed by: INTERNAL MEDICINE

## 2022-12-15 PROCEDURE — 700102 HCHG RX REV CODE 250 W/ 637 OVERRIDE(OP): Performed by: PHYSICIAN ASSISTANT

## 2022-12-15 PROCEDURE — 80053 COMPREHEN METABOLIC PANEL: CPT

## 2022-12-15 PROCEDURE — 700102 HCHG RX REV CODE 250 W/ 637 OVERRIDE(OP): Performed by: NURSE PRACTITIONER

## 2022-12-15 PROCEDURE — 84484 ASSAY OF TROPONIN QUANT: CPT

## 2022-12-15 PROCEDURE — A9270 NON-COVERED ITEM OR SERVICE: HCPCS | Performed by: STUDENT IN AN ORGANIZED HEALTH CARE EDUCATION/TRAINING PROGRAM

## 2022-12-15 PROCEDURE — 700111 HCHG RX REV CODE 636 W/ 250 OVERRIDE (IP): Performed by: PHYSICIAN ASSISTANT

## 2022-12-15 PROCEDURE — 82962 GLUCOSE BLOOD TEST: CPT

## 2022-12-15 PROCEDURE — 84100 ASSAY OF PHOSPHORUS: CPT

## 2022-12-15 PROCEDURE — RXMED WILLOW AMBULATORY MEDICATION CHARGE: Performed by: INTERNAL MEDICINE

## 2022-12-15 PROCEDURE — 97116 GAIT TRAINING THERAPY: CPT

## 2022-12-15 PROCEDURE — A9270 NON-COVERED ITEM OR SERVICE: HCPCS | Performed by: NURSE PRACTITIONER

## 2022-12-15 PROCEDURE — 83735 ASSAY OF MAGNESIUM: CPT

## 2022-12-15 PROCEDURE — 36415 COLL VENOUS BLD VENIPUNCTURE: CPT

## 2022-12-15 PROCEDURE — 700102 HCHG RX REV CODE 250 W/ 637 OVERRIDE(OP): Performed by: INTERNAL MEDICINE

## 2022-12-15 PROCEDURE — 99239 HOSP IP/OBS DSCHRG MGMT >30: CPT | Performed by: INTERNAL MEDICINE

## 2022-12-15 RX ORDER — CEFUROXIME AXETIL 500 MG/1
500 TABLET ORAL 2 TIMES DAILY
Qty: 8 TABLET | Refills: 0 | Status: SHIPPED | OUTPATIENT
Start: 2022-12-15 | End: 2022-12-19

## 2022-12-15 RX ORDER — SACCHAROMYCES BOULARDII 250 MG
1 CAPSULE ORAL 2 TIMES DAILY WITH MEALS
Qty: 14 CAPSULE | Refills: 0 | Status: SHIPPED | OUTPATIENT
Start: 2022-12-15 | End: 2023-03-10

## 2022-12-15 RX ORDER — POLYETHYLENE GLYCOL 3350 17 G/17G
17 POWDER, FOR SOLUTION ORAL 2 TIMES DAILY
Qty: 1 EACH | Refills: 3 | Status: SHIPPED | OUTPATIENT
Start: 2022-12-15 | End: 2023-11-11

## 2022-12-15 RX ADMIN — POLYETHYLENE GLYCOL 3350 1 PACKET: 17 POWDER, FOR SOLUTION ORAL at 09:59

## 2022-12-15 RX ADMIN — LEVETIRACETAM 500 MG: 500 TABLET, FILM COATED ORAL at 08:45

## 2022-12-15 RX ADMIN — LEVETIRACETAM 500 MG: 500 TABLET, FILM COATED ORAL at 16:54

## 2022-12-15 RX ADMIN — CEFTRIAXONE SODIUM 2000 MG: 10 INJECTION, POWDER, FOR SOLUTION INTRAVENOUS at 08:44

## 2022-12-15 RX ADMIN — ACETAMINOPHEN 1000 MG: 500 TABLET ORAL at 08:45

## 2022-12-15 RX ADMIN — LOSARTAN POTASSIUM 50 MG: 50 TABLET, FILM COATED ORAL at 08:45

## 2022-12-15 RX ADMIN — MINERAL OIL, PETROLATUM 1 APPLICATION: 425; 573 OINTMENT OPHTHALMIC at 08:45

## 2022-12-15 RX ADMIN — MINERAL OIL, PETROLATUM 1 APPLICATION: 425; 573 OINTMENT OPHTHALMIC at 12:08

## 2022-12-15 RX ADMIN — DEXAMETHASONE SODIUM PHOSPHATE 2 MG: 4 INJECTION, SOLUTION INTRA-ARTICULAR; INTRALESIONAL; INTRAMUSCULAR; INTRAVENOUS; SOFT TISSUE at 08:44

## 2022-12-15 RX ADMIN — MINERAL OIL, PETROLATUM 1 APPLICATION: 425; 573 OINTMENT OPHTHALMIC at 16:56

## 2022-12-15 RX ADMIN — ACETAMINOPHEN 1000 MG: 500 TABLET ORAL at 16:54

## 2022-12-15 RX ADMIN — INSULIN HUMAN 2 UNITS: 100 INJECTION, SOLUTION PARENTERAL at 12:13

## 2022-12-15 RX ADMIN — INSULIN GLARGINE-YFGN 5 UNITS: 100 INJECTION, SOLUTION SUBCUTANEOUS at 17:01

## 2022-12-15 RX ADMIN — ACETAMINOPHEN 1000 MG: 500 TABLET ORAL at 12:08

## 2022-12-15 ASSESSMENT — COGNITIVE AND FUNCTIONAL STATUS - GENERAL
STANDING UP FROM CHAIR USING ARMS: A LITTLE
WALKING IN HOSPITAL ROOM: A LITTLE
MOBILITY SCORE: 18
MOVING FROM LYING ON BACK TO SITTING ON SIDE OF FLAT BED: A LITTLE
CLIMB 3 TO 5 STEPS WITH RAILING: A LITTLE
MOVING TO AND FROM BED TO CHAIR: A LITTLE
TURNING FROM BACK TO SIDE WHILE IN FLAT BAD: A LITTLE
SUGGESTED CMS G CODE MODIFIER MOBILITY: CK

## 2022-12-15 ASSESSMENT — GAIT ASSESSMENTS
GAIT LEVEL OF ASSIST: CONTACT GUARD ASSIST
ASSISTIVE DEVICE: HAND HELD ASSIST
DISTANCE (FEET): 175
DEVIATION: BRADYKINETIC;SHUFFLED GAIT

## 2022-12-15 ASSESSMENT — PAIN DESCRIPTION - PAIN TYPE
TYPE: ACUTE PAIN;SURGICAL PAIN
TYPE: ACUTE PAIN

## 2022-12-15 NOTE — PROGRESS NOTES
Hospital Medicine Daily Progress Note    Date of Service  12/14/2022    Chief Complaint  Ambrosio Hardy is a 57 y.o. male admitted 11/30/2022 with headache    Hospital Course  Ambrosio Hardy is a 57 y.o. male who presented 11/30/2022 with headaches. This is a pleasant gentleman with a history of hypertension who presented with worsening headaches for 2 months.  The headache was moderate to severe.  Patient did not have any focal weaknesses or paresthesias.  He did have intermittent blurry vision although on presentation he was not complaining of any significant vision problems.  He presented to [facility] in [place], where a head CT showed possible occipital mass with midline shift.  Patient was subsequently transferred to [facility] for a neurosurgical consultation.  The MRI showed a large occipital homogeneously enhancing mass consistent with a meningioma.  Neurosurgery was consulted and the patient was started on dexamethasone due to cerebral edema secondary to brain compression from the tumor.    The patient underwent embolization of large feeders to the meningioma from the right occipital artery, right MMA, and left MMA using 150-300 UM particles and Gelfoam by Dr. Mcdonald of interventional radiology on 12/5/2022.  Patient subsequently underwent resection of the meningioma on 12/7/2022 by Dr. Dieudonne Donis. There was large amount of blood loss requiring multiple units of blood transfusion including 4 units of PRBCs and 4 units of FFP.  Patient was readmitted to the intensive care unit intubated.  He was extubated on 12/8/2022.    Post patient developed some inability to perceive light, which was concerning for a occipital stroke versus brain shift.  MRI of the brain showed a small right posterior cerebral artery stroke.    On 12/10/2022, patient developed some stomach discomfort associated with dynamic T wave inversions on EKG.  Troponins were  negative x3.  Echocardiogram showed an ejection fraction of 60 to 65% with no regional wall abnormalities.  Enlarged right ventricle was noted.  Patient underwent a MPI stress test.    12/10: Medicine had previously signed off.  However, notified of T wave inversions on EKG.  Patient currently denies any chest pain although he reports some discomfort in his stomach.  Initial troponin was negative.  I have ordered 2 additional troponins.  No nausea vomiting.  Patient is status post resection of meningioma postop day #3.  He denies history of diabetes.    12/11: No significant events overnight.  No more stomach discomfort overnight.  Repeat EKG yesterday showing stable T wave inversions.  Troponins negative x3.  Echocardiogram showing normal ejection fraction of 65%.  No significant valvular disease.  Large right ventricular size.  MPI stress test ordered.  Unfortunately patient ate and had coffee this morning so would not be able to go until tomorrow.  Awaiting physiatry consultation.  Planning to discharge to inpatient acute rehabilitation versus skilled nursing facility.      Personally reviewed the MRI of the brain with and without contrast performed on 12/10/2022.  Per my read no remaining enhancing mass in the left occipital area.  There is increased DWI signal in the right occipital area consistent with a small PCA stroke.  There is also increased T2 signal consistent with mild cerebral edema.      I personally reviewed the EKG below.  Per my read normal sinus rhythm with heart rate of 74, QTC of 460, T wave inversions in V1, V2, V3, which are stable.                12/12/2022  No significant events overnight.  Patient continues to have abdominal pain.  Abdominal x-ray showing a foreign body that is moving along the lower GI tract.  Stress testing negative.  No further intervention or work-up of chest pain indicated at this time.  Due to patient's [employer] insurance, unlikely to be accepted to local skilled  nursing facility.  Will be discharged with home health likely tomorrow once the foreign body passes through his GI tract.  Abdominal x-ray for tomorrow has been ordered.    Abdominal x-ray per my read shows a foreign body within the lower GI tract that is moving on subsequent x-ray.    Interval Problem Update  12/13. AXR came back, the metallic object migrated to decending colon now. Patient denies hat happened. Even little sister got curious  12/14. AXR shows the object is back at the cecum. Painimproved after Golyetely    I have discussed this patient's plan of care and discharge plan with bedside nurse and case management.    Consultants/Specialty  neurosurgery    Code Status  Full Code    Disposition  Patient is not medically cleared for discharge.   Anticipate discharge to to an inpatient rehabilitation hospital versus skilled nursing facility.  I have placed the appropriate orders for post-discharge needs.    Review of Systems  Review of Systems   Constitutional:  Positive for malaise/fatigue. Negative for chills and fever.   Respiratory:  Negative for cough and shortness of breath.    Cardiovascular:  Negative for chest pain and palpitations.   Gastrointestinal:  Positive for abdominal pain. Negative for constipation, diarrhea, nausea and vomiting.   Genitourinary:  Negative for dysuria and hematuria.   Musculoskeletal:  Negative for joint pain and myalgias.   Neurological:  Negative for dizziness and headaches.   Psychiatric/Behavioral:  The patient is not nervous/anxious and does not have insomnia.       Physical Exam  Temp:  [36.8 °C (98.2 °F)-37.2 °C (99 °F)] 36.8 °C (98.2 °F)  Pulse:  [] 110  Resp:  [18] 18  BP: (109-110)/(57-68) 110/68  SpO2:  [92 %-97 %] 92 %    Physical Exam  Vitals and nursing note reviewed.   Constitutional:       General: He is not in acute distress.     Appearance: Normal appearance. He is not ill-appearing.   HENT:      Head: Normocephalic and atraumatic.      Comments:  Craniotomy incision clean dry and intact     Mouth/Throat:      Mouth: Mucous membranes are moist.      Pharynx: Oropharynx is clear. No oropharyngeal exudate.   Eyes:      General: No scleral icterus.        Right eye: No discharge.         Left eye: No discharge.      Conjunctiva/sclera: Conjunctivae normal.   Cardiovascular:      Rate and Rhythm: Normal rate and regular rhythm.      Pulses: Normal pulses.      Heart sounds: Normal heart sounds. No murmur heard.  Pulmonary:      Effort: Pulmonary effort is normal. No respiratory distress.      Breath sounds: Normal breath sounds.   Abdominal:      General: Abdomen is flat. Bowel sounds are normal. There is no distension.      Palpations: Abdomen is soft.      Tenderness: There is abdominal tenderness (improved).   Musculoskeletal:         General: No swelling.      Cervical back: Neck supple. No tenderness.      Right lower leg: No edema.      Left lower leg: No edema.   Skin:     General: Skin is warm and dry.      Coloration: Skin is not pale.   Neurological:      Mental Status: He is alert. Mental status is at baseline.      Motor: Weakness (Diffuse weakness in all 4 extremities) present.      Comments: Sleeping  COgnitive deficits vs odd behavior   Psychiatric:         Thought Content: Thought content normal.         Judgment: Judgment normal.      Comments: Odd behavior       Fluids  No intake or output data in the 24 hours ending 12/14/22 1700      Laboratory  Recent Labs     12/12/22  0112 12/13/22  0419 12/14/22  1033   WBC 24.5* 23.0* 21.7*   RBC 3.33* 3.47* 3.58*   HEMOGLOBIN 10.3* 10.7* 10.9*   HEMATOCRIT 29.8* 30.3* 32.0*   MCV 89.5 87.3 89.4   MCH 30.9 30.8 30.4   MCHC 34.6 35.3 34.1   RDW 44.6 43.8 46.5   PLATELETCT 145* 162* 161*   MPV 10.2 9.9 10.7       Recent Labs     12/12/22  0112 12/13/22  0419 12/14/22  1033   SODIUM 135 129* 131*   POTASSIUM 4.1 4.1 3.6   CHLORIDE 97 95* 96   CO2 28 24 24   GLUCOSE 157* 171* 165*   BUN 17 17 14   CREATININE  0.66 0.55 0.55   CALCIUM 8.5 7.9* 8.2*       Recent Labs     12/13/22 2050   INR 1.18*                 Imaging  [id number] VIEW   Final Result         1.  Nonspecific bowel gas pattern in the upper abdomen.   2.  Previously visualized linear metallic density in the left upper quadrant is now seen overlying the right lower quadrant, could be within the distal small bowel or cecum.      DX-CHEST-PORTABLE (1 VIEW)   Final Result      1.  There is no acute cardiopulmonary process.      [id number] VIEW   Final Result      Linear metallic structure projects over the left abdomen, possibly in the proximal descending colon.      [id number]-3 VIEW   Final Result      Short segment of wire-like radiopaque foreign body has migrated over to the right midabdomen. Presumably transit within small bowel.      NM-CARDIAC STRESS TEST   Final Result      YA-[id number] VIEW   Final Result      1.  Nonobstructive bowel gas pattern. Small amount of stool throughout the colon.   2.  A 2.2 cm linear radiopaque object projecting over the left lower abdomen may represent a foreign body.      MR-BRAIN-WITH & W/O   Final Result      1.  Postoperative bilateral parietal craniectomy and cranioplasty with gross total resection of a large bilateral posterior parietal meningioma. Expected postoperative changes with some hemorrhage along the posterior falx and left parietal and occipital    lobes at the operative site. Some surrounding cytotoxic edema in the brain parenchyma with a globoid area of bright signal in the right lateral occipital-parietal lobe which may represent an area of postoperative infarct. Minimal residual enhancement    along the falx.   2.  Small left hemisphere subdural hygroma.      EC-ECHOCARDIOGRAM COMPLETE W/O CONT   Final Result      DX-CHEST-PORTABLE (1 VIEW)   Final Result         1.  Interstitial pulmonary parenchymal prominence, compatible with interstitial edema and/or infiltrates, slightly more pronounced  compared to prior study.   2.  Cardiomegaly      DX-CHEST-PORTABLE (1 VIEW)   Final Result         1.  Interstitial pulmonary parenchymal prominence, compatible with interstitial edema and/or infiltrates.      CT-HEAD W/O   Final Result         1.  Streaky hyperdense hemorrhage and pneumocephalus within occipital surgical bed, within expected limits for recent postop changes.   2.  Atherosclerosis.         DX-CHEST-PORTABLE (1 VIEW)   Final Result         1.  No acute cardiopulmonary disease.      DX-CHEST-PORTABLE (1 VIEW)   Final Result      No acute cardiopulmonary abnormality.      IR-EMBOLIZE-NEURO-INTRACRANIAL   Final Result   Impression:      57-year-old patient with a large bilateral midline parieto-occipital meningioma underwent cerebral angiography and embolization as described above.      Tumor blush with feeders from the right middle meningeal, left middle meningeal, right occipital artery were embolized to occlusion using a combination of particles and Gelfoam.      Additional leptomeningeal feeders to the meningioma arise from left MCA, DENISSE, PCA.      A small arterial venous shunt is also noted within the lesion arising from small branches of the left posterior cerebral artery.      Occlusion of the posterior segment of the superior sagittal sinus is demonstrated due to invasion by the tumor. Multiple collateral veins have developed around the occluded segment with predominant anterior drainage into the superior anastomotic veins.      IAdelina was physically present and participated during the entire procedure of the IR-EMBOLIZE-NEURO-INTRACRANIAL.      MR-STEALTH BRAIN WITH & W/O   Final Result      There is an approximately 6.7 x 4.7 x 5.9 cm sized enhancing extra-axial mass noted in the bilateral mid posterior parietal region along the both sides of posterior falx cerebri. The superior sagittal sinus is infiltrated and surrounded by this tumor.    There is also abnormal adjacent dural  enhancement .The adjacent parietal/occipital bones are infiltrated by this tumor. There is also hyperostosis of the calvarial surface of the bones with enhancement. There are multiple enlarged blood vessels are noted    along the anterior margin of the lesion. There is diffuse white matter edema noted in the bilateral parietal and occipital lobes surrounding the lesion.There is an approximately 6 mm midline shift towards right side. 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 The other differential diagnosis includes hemangiopericytoma and dural based metastasis. Findings were discussed with SUSY PRATT on 12/1/2022 7:43 AM.      MR-BRAIN-WITH & W/O   Final Result      There is an approximately 6.7 x 4.7 x 5.9 cm sized enhancing extra-axial mass noted in the bilateral mid posterior parietal region along the both sides of posterior falx cerebri. The superior sagittal sinus is infiltrated and surrounded by this tumor.    There is also abnormal adjacent dural enhancement .The adjacent parietal/occipital bones are infiltrated by this tumor. There is also hyperostosis of the calvarial surface of the bones with enhancement. There are multiple enlarged blood vessels are noted    along the anterior margin of the lesion. There is diffuse white matter edema noted in the bilateral parietal and occipital lobes surrounding the lesion.There is an approximately 6 mm midline shift towards right side. 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 The other differential diagnosis includes hemangiopericytoma and dural based metastasis. Findings were discussed with SUSY PRATT on 12/1/2022 7:43 AM.      CT-CHEST,ABDOMEN,PELVIS WITH   Final Result         1.  No significant abnormality in thorax, abdomen and pelvis CT scan.   2.  Hepatomegaly   3.  Cholelithiasis   4.   "Segment 2 duodenal diverticula   5.  Atherosclerosis   6.  Fat-containing left inguinal hernia   7.  Fat-containing umbilical hernia   8.  Diverticulosis   9.  Main pulmonary artery dilatation, appearance suggests changes related to pulmonary artery aneurysm or pulmonary hypertension.             Assessment/Plan       * Cerebral edema, meningioma, postprocedure CVA, foreign body GI (HCC)- (present on admission)  Assessment & Plan  Associated with brain compression secondary to meningioma.  Patient also has postoperative cerebral edema.    12/11/2022  Continue dexamethasone taper as per neurosurgery\"    Dexamethasone    FB GI (foreign body in gastrointestinal tract), initial encounter- (present on admission)  Assessment & Plan       12/12/2022  Unidentified object in GI tract.  Continue serial abdominal x-rays until cleared.\"    Repeat AXR tomorrow   Pain improved after Go lytely    Abdominal pain- (present on admission)  Assessment & Plan  Etiology unclear.  Possibly related to foreign body.    Continue serial abdominal x-rays.    Leukocytosis  Assessment & Plan  Likely secondary to steroids.  No signs of infection at this time.  Infectious work-up and prelim antibiotics as WNC up to 23K  Trended down slightly to 21K procalc 0.18. Urine and CXR unremarkable for infx.  Trend WBCs for now.     Postoperative cerebrovascular infarction following non-cardiac surgery (HCC)- (present on admission)  Assessment & Plan  12/11/2022:  MRI of the brain showing right PCA stroke following resection of the meningioma.  No need for aspirin at this time.    Acute ischemic right PCA stroke (HCC)- (present on admission)  Assessment & Plan  12/11/2022:  MRI showing perioperative right PCA stroke.  Likely cause of vision deficit.    Fall precautions  PT and OT consults    Hypertension- (present on admission)  Assessment & Plan  12/10/2022:  Blood pressures controlled.  Goal of less than 130/80.  Resume home losartan at lower dose of 50 " "mg daily.    12/11/2022:  Blood pressures well controlled.  Continuing on losartan 50 mg daily.\"    Vitals:    12/13/22 2315   BP: 110/68   Pulse: (!) 110   Resp: 18   SpO2: 92%     Stable.      I have performed a physical exam and reviewed and updated ROS and Plan today (12/14/2022). In review of yesterday's note (12/13/2022), there are no changes except as documented above.    VTE prophylaxis:     SCDs/TEDs    "

## 2022-12-15 NOTE — CARE PLAN
The patient is Stable - Low risk of patient condition declining or worsening    Shift Goals  Clinical Goals: Compliance  Patient Goals: Discharge  Family Goals: elizabeth    Progress made toward(s) clinical / shift goals:      Problem: Pain - Standard  Goal: Alleviation of pain or a reduction in pain to the patient’s comfort goal  Outcome: Progressing     Problem: Fall Risk  Goal: Patient will remain free from falls  Outcome: Progressing     Problem: Self Care  Goal: Patient will have the ability to perform ADLs independently or with assistance (bathe, groom, dress, toilet and feed)  Outcome: Progressing       Patient is not progressing towards the following goals:

## 2022-12-15 NOTE — THERAPY
Physical Therapy   Daily Treatment     Patient Name: Ambrosio Hardy  Age:  57 y.o., Sex:  male  Medical Record #: [id number]  Today's Date: 12/15/2022     Precautions  Precautions: Fall Risk    Assessment    Pt eager to mobilize. Only required physical assist due to vision deficits. HHA provided to assist in steering patient as he was unable to navigate at all due to blindness. Pt confirmed via  that his family will continue to provide HHA at home. Pt reported no further concerns. Patient will not be actively followed for physical therapy services at this time, however may be seen if requested by physician for 1 more visit within 30 days to address any discharge or equipment needs.     Plan    Discharge due to having achieved highest practical level of function secondary to vision deficits    DC Equipment Recommendations: None  Discharge Recommendations: Recommend home health for continued physical therapy services           Objective       12/15/22 1043   Cognition    Level of Consciousness Alert   Ability To Follow Commands 1 Step   Attention Impaired   Sequencing Impaired   Balance   Sitting Balance (Static) Good   Sitting Balance (Dynamic) Good   Standing Balance (Static) Fair   Standing Balance (Dynamic) Fair   Weight Shift Sitting Fair   Weight Shift Standing Fair   Skilled Intervention Verbal Cuing;Tactile Cuing   Comments HHA only for navigation, otherwise steady without LOB   Gait Analysis   Gait Level Of Assist Contact Guard Assist   Assistive Device Hand Held Assist   Distance (Feet) 175   # of Times Distance was Traveled 1   Deviation Bradykinetic;Shuffled Gait   Weight Bearing Status no restrictions   Vision Deficits Impacting Mobility unable to navigate without hand held assist due to blindness   Skilled Intervention Verbal Cuing;Tactile Cuing;Sequencing   Bed Mobility    Supine to Sit Supervised   Sit to Supine   (in chair)   Skilled Intervention Verbal Cuing   Functional Mobility    Sit to Stand Supervised   Bed, Chair, Wheelchair Transfer Contact Guard Assist   Skilled Intervention Verbal Cuing;Tactile Cuing;Sequencing   How much difficulty does the patient currently have...   Turning over in bed (including adjusting bedclothes, sheets and blankets)? 3   Sitting down on and standing up from a chair with arms (e.g., wheelchair, bedside commode, etc.) 3   Moving from lying on back to sitting on the side of the bed? 3   How much help from another person does the patient currently need...   Moving to and from a bed to a chair (including a wheelchair)? 3   Need to walk in a hospital room? 3   Climbing 3-5 steps with a railing? 3   6 clicks Mobility Score 18   Short Term Goals    Short Term Goal # 1 Pt will perform supine <> sit with SPV in 6 visits to get in/out of bed   Goal Outcome # 1 Goal met   Short Term Goal # 2 Pt will perform STS transfers with SPV in 6 visits to get in/out of chair   Goal Outcome # 2 Goal met   Short Term Goal # 3 Pt will ambulate 150ft with no AD and SPV in 6 visits to access household distances   Goal Outcome # 3   (CGA only for vision deficits)

## 2022-12-15 NOTE — PROGRESS NOTES
Monitor Summary: SR/ST , IN .15, QRS .08, QT .35 with rare PAC, PVC  per strip from monitor room

## 2022-12-15 NOTE — PROGRESS NOTES
Monitor summary: SR 71-84, DC 0.13, QRS 0.10, QT 0.39, with rare PVCs and rare PACs per strip from monitor room.

## 2022-12-16 NOTE — DISCHARGE SUMMARY
Discharge Summary    CHIEF COMPLAINT ON ADMISSION  Chief Complaint   Patient presents with    Headache     Pt states HA has been going on for 2 months intermittently. Pt states pain is in the back of the head and is a 6-7/10. Pt seen at [facility] in [facility] where CT shows occipital mass with shift. Pt sent here for evaluation by Neurology.        Reason for Admission  Sent By Md;Head Pain     Admission Date  11/30/2022    CODE STATUS  Full Code    HPI & HOSPITAL COURSE  This is a 57 y.o. male here with Headache (Pt states HA has been going on for 2 months intermittently. Pt states pain is in the back of the head and is a 6-7/10. Pt seen at [facility] in [facility] where CT shows occipital mass with shift. Pt sent here for evaluation by Neurology. )   Please review Dr. Yossi Williamson M.D. notes for further details of history of present illness, past medical/social/family histories, allergies and medications. Please review BYRON Bobo, Dr. Mendoza, IR consultation notes. I also spoke to GI.  HE has a history of hypertension who presented with worsening headaches for 2 months moderate to severe. Accompanied by intermittent blurry vision. No focal weaknesses or paresthesias.    He was seen at [facility] in [place], where a head CT showed possible occipital mass with midline shift.  Patient was subsequently transferred to [facility] for a neurosurgical consultation.  MRI showed a large occipital homogeneously enhancing mass consistent with a meningioma.  Neurosurgery was consulted and the patient was started on dexamethasone due to cerebral edema secondary to brain compression from the tumor.  He underwent embolization of large feeders to the meningioma from the right occipital artery, right MMA, and left MMA using 150-300 UM particles and Gelfoam by Dr. Mcdonald of interventional radiology on 12/5/2022.  Patient subsequently underwent resection of the  meningioma on 12/7/2022 by Dr. Dieudonne Donis. There was large amount of blood loss requiring multiple units of blood transfusion including 4 units of PRBCs and 4 units of FFP.  Patient was readmitted to the intensive care unit intubated.  He was extubated on 12/8/2022.  Post patient developed some inability to perceive light, which was concerning for a occipital stroke versus brain shift.  MRI of the brain showed a small right posterior cerebral artery stroke.  He developed some stomach discomfort associated with dynamic T wave inversions on EKG.  Troponins were negative x3.  Echocardiogram showed an ejection fraction of 60 to 65% with no regional wall abnormalities.  Enlarged right ventricle was noted.  Patient underwent a MPI stress test, which was negative.    Patient continues to have significant abdominal pain.  Serial abdominal x-rays were performed, which showed a foreign body moving along the lower GI tract. I spoke with GI at the time the object had moved already to the colon and per GI they do not endoscopically retrieve objects in the colon. He recommended GO lytelty which I gave him. He was actually constipated and had large BMs after Go lytely. The foreign object moved to the rectum. His pain has improved and he wants to go home and DECLINED further AXR and actual confiirming that it has passed. He will f/u with PCP and repeat imaging can be done then unless he physically sees that the object has passed. [facility] has been arranged.    At discharge date, Ambrosio Hardy afebrile and hemodynamically stable.  Ambrosio Hardy wanted to be discharged today.    Discharge Physical Exam  Vitals and nursing note reviewed.   Constitutional:       General: He is not in acute distress.     Appearance: Normal appearance. He is not ill-appearing.   HENT:      Head: Normocephalic and atraumatic.      Comments: Craniotomy incision clean dry and intact     Mouth/Throat:      Mouth: Mucous membranes are  moist.      Pharynx: Oropharynx is clear. No oropharyngeal exudate.   Eyes:      General: No scleral icterus.        Right eye: No discharge.         Left eye: No discharge.      Conjunctiva/sclera: Conjunctivae normal.   Cardiovascular:      Rate and Rhythm: Normal rate and regular rhythm.      Pulses: Normal pulses.      Heart sounds: Normal heart sounds. No murmur heard.  Pulmonary:      Effort: Pulmonary effort is normal. No respiratory distress.      Breath sounds: Normal breath sounds.   Abdominal:      General: Abdomen is flat. Bowel sounds are normal. There is no distension.      Palpations: Abdomen is soft.   Musculoskeletal:         General: No swelling.      Cervical back: Neck supple. No tenderness.      Right lower leg: No edema.      Left lower leg: No edema.   Skin:     General: Skin is warm and dry.      Coloration: Skin is not pale.   Neurological:      Mental Status: He is alert. Mental status is at baseline.      Motor: Weakness (Diffuse weakness in all 4 extremities) present.      Comments: Sleeping  COgnitive deficits vs odd behavior   Psychiatric:         Thought Content: Thought content normal.         Judgment: Judgment normal.      Comments: Odd behavior           Imaging  QL-[id number]-1 VIEW   Final Result      Small linear metallic structure again projects over the sacrum, likely within the rectal vault.      KB-XBYJDCB-3 VIEW   Final Result      A small linear metallic foreign body is seen projecting over the midline sacrum, previously located in the right lower quadrant. This presumably represents the ingested foreign body which is likely located in the distal sigmoid colon/rectum.      VX-[id number]-6 VIEW   Final Result         1.  Nonspecific bowel gas pattern in the upper abdomen.   2.  Previously visualized linear metallic density in the left upper quadrant is now seen overlying the right lower quadrant, could be within the distal small bowel or cecum.      DX-CHEST-PORTABLE (1 VIEW)    Final Result      1.  There is no acute cardiopulmonary process.      WV-BAKUOCA-4 VIEW   Final Result      Linear metallic structure projects over the left abdomen, possibly in the proximal descending colon.      DU-WUPFBDU-6 VIEW   Final Result      Short segment of wire-like radiopaque foreign body has migrated over to the right midabdomen. Presumably transit within small bowel.      NM-CARDIAC STRESS TEST   Final Result      VE-LXXDSRE-0 VIEW   Final Result      1.  Nonobstructive bowel gas pattern. Small amount of stool throughout the colon.   2.  A 2.2 cm linear radiopaque object projecting over the left lower abdomen may represent a foreign body.      MR-BRAIN-WITH & W/O   Final Result      1.  Postoperative bilateral parietal craniectomy and cranioplasty with gross total resection of a large bilateral posterior parietal meningioma. Expected postoperative changes with some hemorrhage along the posterior falx and left parietal and occipital    lobes at the operative site. Some surrounding cytotoxic edema in the brain parenchyma with a globoid area of bright signal in the right lateral occipital-parietal lobe which may represent an area of postoperative infarct. Minimal residual enhancement    along the falx.   2.  Small left hemisphere subdural hygroma.      EC-ECHOCARDIOGRAM COMPLETE W/O CONT   Final Result      DX-CHEST-PORTABLE (1 VIEW)   Final Result         1.  Interstitial pulmonary parenchymal prominence, compatible with interstitial edema and/or infiltrates, slightly more pronounced compared to prior study.   2.  Cardiomegaly      DX-CHEST-PORTABLE (1 VIEW)   Final Result         1.  Interstitial pulmonary parenchymal prominence, compatible with interstitial edema and/or infiltrates.      CT-HEAD W/O   Final Result         1.  Streaky hyperdense hemorrhage and pneumocephalus within occipital surgical bed, within expected limits for recent postop changes.   2.  Atherosclerosis.          DX-CHEST-PORTABLE (1 VIEW)   Final Result         1.  No acute cardiopulmonary disease.      DX-CHEST-PORTABLE (1 VIEW)   Final Result      No acute cardiopulmonary abnormality.      IR-EMBOLIZE-NEURO-INTRACRANIAL   Final Result   Impression:      57-year-old patient with a large bilateral midline parieto-occipital meningioma underwent cerebral angiography and embolization as described above.      Tumor blush with feeders from the right middle meningeal, left middle meningeal, right occipital artery were embolized to occlusion using a combination of particles and Gelfoam.      Additional leptomeningeal feeders to the meningioma arise from left MCA, DENISSE, PCA.      A small arterial venous shunt is also noted within the lesion arising from small branches of the left posterior cerebral artery.      Occlusion of the posterior segment of the superior sagittal sinus is demonstrated due to invasion by the tumor. Multiple collateral veins have developed around the occluded segment with predominant anterior drainage into the superior anastomotic veins.      IAdelina was physically present and participated during the entire procedure of the IR-EMBOLIZE-NEURO-INTRACRANIAL.      MR-STEALTH BRAIN WITH & W/O   Final Result      There is an approximately 6.7 x 4.7 x 5.9 cm sized enhancing extra-axial mass noted in the bilateral mid posterior parietal region along the both sides of posterior falx cerebri. The superior sagittal sinus is infiltrated and surrounded by this tumor.    There is also abnormal adjacent dural enhancement .The adjacent parietal/occipital bones are infiltrated by this tumor. There is also hyperostosis of the calvarial surface of the bones with enhancement. There are multiple enlarged blood vessels are noted    along the anterior margin of the lesion. There is diffuse white matter edema noted in the bilateral parietal and occipital lobes surrounding the lesion.There is an approximately 6 mm  midline shift towards right side. 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 The other differential diagnosis includes hemangiopericytoma and dural based metastasis. Findings were discussed with SUSY PRATT on 12/1/2022 7:43 AM.      MR-BRAIN-WITH & W/O   Final Result      There is an approximately 6.7 x 4.7 x 5.9 cm sized enhancing extra-axial mass noted in the bilateral mid posterior parietal region along the both sides of posterior falx cerebri. The superior sagittal sinus is infiltrated and surrounded by this tumor.    There is also abnormal adjacent dural enhancement .The adjacent parietal/occipital bones are infiltrated by this tumor. There is also hyperostosis of the calvarial surface of the bones with enhancement. There are multiple enlarged blood vessels are noted    along the anterior margin of the lesion. There is diffuse white matter edema noted in the bilateral parietal and occipital lobes surrounding the lesion.There is an approximately 6 mm midline shift towards right side. 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 The other differential diagnosis includes hemangiopericytoma and dural based metastasis. Findings were discussed with SUSY PRATT on 12/1/2022 7:43 AM.      CT-CHEST,ABDOMEN,PELVIS WITH   Final Result         1.  No significant abnormality in thorax, abdomen and pelvis CT scan.   2.  Hepatomegaly   3.  Cholelithiasis   4.  Segment 2 duodenal diverticula   5.  Atherosclerosis   6.  Fat-containing left inguinal hernia   7.  Fat-containing umbilical hernia   8.  Diverticulosis   9.  Main pulmonary artery dilatation, appearance suggests changes related to pulmonary artery aneurysm or pulmonary hypertension.                  Therefore, he is discharged in fair and stable condition to home with organized home healthcare and  close outpatient follow-up.    The patient met 2-midnight criteria for an inpatient stay at the time of discharge.    Discharge Date  12/15/2022    FOLLOW UP ITEMS POST DISCHARGE      DISCHARGE DIAGNOSES  Principal Problem:    Cerebral edema, meningioma, postprocedure CVA, foreign body GI (HCC) POA: Yes  Active Problems:    Hypertension (Chronic) POA: Yes    Acute ischemic right PCA stroke (HCC) POA: Yes    Postoperative cerebrovascular infarction following non-cardiac surgery (HCC) POA: Yes    Leukocytosis POA: Unknown    Abdominal pain POA: Yes    FB GI (foreign body in gastrointestinal tract), initial encounter POA: Yes  Resolved Problems:    Brain mass POA: Yes    Hyperglycemia POA: Yes    Hyponatremia POA: Yes    On mechanically assisted ventilation (HCC) POA: Yes    Lactic acidosis POA: No    SIRS (systemic inflammatory response syndrome) (HCC) POA: Yes    T wave inversion in EKG POA: No    Brain compression (HCC) POA: Yes    Meningioma, cerebral (HCC) POA: Yes    Hypokalemia POA: No      FOLLOW UP  Future Appointments   Date Time Provider Department Center   12/21/2022  8:30 AM KHANH GordonNLUBA. [facility] None     KHANH GordonNByronP.  [address] [facility] [id number] [phone]    Go on 12/21/2022  Please go to your follow up appointment with Evan VALIENTE on Wednesday December 21, 2022 check in at 8:15am for a 8:30am appointment.  Follow up with Dr. Donis in 1 week, postop visit for meningioma resection  Assign and follow up with primary provider or discharge clinic physician in 1 week  NURSING provide resources/pamphlets for  Hypertension, steroid use, stroke. AVOID CONSTIPATION    MEDICATIONS ON DISCHARGE     Medication List        START taking these medications        Instructions   acetaminophen 500 MG Tabs  Commonly known as: TYLENOL   Take 2 Tablets by mouth every 6 hours as needed for Mild Pain or Moderate Pain.  Dose: 1,000 mg     cefUROXime 500 MG Tabs  Commonly  known as: CEFTIN   Sullivan 1 tableta por vía oral 2 veces al día por 4 días.  (Take 1 Tablet by mouth 2 times a day for 4 days.)  Dose: 500 mg     dexamethasone 2 MG tablet  Start taking on: December 13, 2022  Commonly known as: DECADRON   Take 2 Tablets by mouth 2 times a day for 1 day, THEN 1 Tablet 2 times a day for 1 day, THEN 1 Tablet every day for 1 day.     docusate sodium 100 MG Caps   Take 100 mg by mouth 2 times a day. While taking narcotic pain medications to prevent constipation  Dose: 100 mg     levETIRAcetam 500 MG Tabs  Commonly known as: KEPPRA   Sullivan 1 tableta por vía oral 2 veces al día.  (Take 1 Tablet by mouth 2 times a day.)  Dose: 500 mg     oxyCODONE immediate release 10 MG immediate release tablet  Commonly known as: ROXICODONE   Take 1 Tablet by mouth every 6 hours as needed for Severe Pain for up to 5 days.  Dose: 10 mg     polyethylene glycol/lytes 17 g Pack  Commonly known as: MIRALAX   Take 1 Packet by mouth 2 times a day. While taking narcotics to prevent constipation  Dose: 17 g     saccharomyces boulardii 250 MG Caps  Commonly known as: FLORASTOR   Sullivan 1 cápsula por vía oral dos veces al día con las comidas.  (Take 1 Capsule by mouth 2 times a day with meals.)  Dose: 1 Capsule            CHANGE how you take these medications        Instructions   losartan 50 MG Tabs  What changed: when to take this  Commonly known as: COZAAR   Sullivan 1 tableta por vía oral diariamente.  (Take 1 Tablet by mouth every day.)  Dose: 50 mg              Allergies  No Known Allergies    DIET  Orders Placed This Encounter   Procedures    Diet Order Diet: Consistent CHO (Diabetic) (Patient is blind); Miscellaneous modifications: (optional): [employer] ; Tray Modifications (optional): SLP - Deliver to Nursing Station     Standing Status:   Standing     Number of Occurrences:   1     Order Specific Question:   Diet:     Answer:   Consistent CHO (Diabetic) [4]     Comments:   Patient is blind     Order Specific  Question:   Miscellaneous modifications: (optional)     Answer:   [employer]  [2]     Order Specific Question:   Tray Modifications (optional)     Answer:   SLP - Deliver to Nursing Station       ACTIVITY  Avoid heavy lifting or strenuous activity    CONSULTATIONS  MSG  I spokr with GI  IR    PROCEDURES  Dr. Donis's OP NOTE  Dr. Grey PROC NOTE    LABORATORY  Lab Results   Component Value Date    SODIUM 132 (L) 12/15/2022    POTASSIUM 3.1 (L) 12/15/2022    CHLORIDE 95 (L) 12/15/2022    CO2 28 12/15/2022    GLUCOSE 152 (H) 12/15/2022    BUN 12 12/15/2022    CREATININE 0.48 (L) 12/15/2022        Lab Results   Component Value Date    WBC 15.5 (H) 12/15/2022    HEMOGLOBIN 9.3 (L) 12/15/2022    HEMATOCRIT 27.2 (L) 12/15/2022    PLATELETCT 176 12/15/2022        Total time of the discharge process exceeds 40 minutes.

## 2022-12-18 LAB
BACTERIA BLD CULT: NORMAL
BACTERIA BLD CULT: NORMAL
SIGNIFICANT IND 70042: NORMAL
SIGNIFICANT IND 70042: NORMAL
SITE SITE: NORMAL
SITE SITE: NORMAL
SOURCE SOURCE: NORMAL
SOURCE SOURCE: NORMAL

## 2022-12-28 NOTE — DOCUMENTATION QUERY
[facility]                                                                       Query Response Note      PATIENT:               YIFAN LANCASTER  ACCT #:                  [id number]  MRN:                     [id number]  :                      1965  ADMIT DATE:       2022 7:58 PM  DISCH DATE:          RESPONDING  PROVIDER #:        [id number]           QUERY TEXT:    Large amount of blood loss requiring transfusion of RBCs and FFP.    Please clarify the clinical relevance for the clinical/diagnostic findings stated below:      The patient's clinical indicators include:  Clinical Indicators:  Large amount of blood loss during resection of brain lesion.    Treatment:  Transfusion of RBCs and FFP.    Risk Factors:  Brain mass    [email or web address]  Options provided:   -- Clinically relevant (please specify in diagnostic terminology)]  [[Insignificant findings   -- Clinically unable to be further specified   -- Other (please specify in the medical record)      Query created by: Camila Clemens on 2022 11:18 AM    RESPONSE TEXT:    Clinically unable to be further specified          Electronically signed by:  PARADISE RUTLEDGE MD 2022 3:01 PM

## 2023-03-11 PROBLEM — R10.9 ABDOMINAL PAIN: Status: RESOLVED | Noted: 2022-12-12 | Resolved: 2023-03-11

## 2023-03-11 PROBLEM — D72.829 LEUKOCYTOSIS: Status: RESOLVED | Noted: 2022-12-11 | Resolved: 2023-03-11

## 2024-04-15 PROBLEM — K37 APPENDICITIS: Status: ACTIVE | Noted: 2024-04-15

## 2024-07-03 ENCOUNTER — APPOINTMENT (OUTPATIENT)
Dept: RADIOLOGY | Facility: MEDICAL CENTER | Age: 59
DRG: 922 | End: 2024-07-03
Attending: NEUROLOGICAL SURGERY
Payer: COMMERCIAL

## 2024-07-03 ENCOUNTER — HOSPITAL ENCOUNTER (INPATIENT)
Facility: MEDICAL CENTER | Age: 59
LOS: 3 days | DRG: 922 | End: 2024-07-06
Attending: EMERGENCY MEDICINE | Admitting: STUDENT IN AN ORGANIZED HEALTH CARE EDUCATION/TRAINING PROGRAM
Payer: COMMERCIAL

## 2024-07-03 DIAGNOSIS — G93.89 MASS OF BRAIN: ICD-10-CM

## 2024-07-03 DIAGNOSIS — R90.89 MIDLINE SHIFT OF BRAIN: ICD-10-CM

## 2024-07-03 DIAGNOSIS — G93.6 VASOGENIC EDEMA (HCC): ICD-10-CM

## 2024-07-03 LAB
ALBUMIN SERPL BCP-MCNC: 4.2 G/DL (ref 3.2–4.9)
ALBUMIN/GLOB SERPL: 1.6 G/DL
ALP SERPL-CCNC: 129 U/L (ref 30–99)
ALT SERPL-CCNC: 13 U/L (ref 2–50)
ANION GAP SERPL CALC-SCNC: 13 MMOL/L (ref 7–16)
AST SERPL-CCNC: 9 U/L (ref 12–45)
BASOPHILS # BLD AUTO: 0.3 % (ref 0–1.8)
BASOPHILS # BLD: 0.02 K/UL (ref 0–0.12)
BILIRUB SERPL-MCNC: 0.6 MG/DL (ref 0.1–1.5)
BUN SERPL-MCNC: 11 MG/DL (ref 8–22)
CALCIUM ALBUM COR SERPL-MCNC: 9.4 MG/DL (ref 8.5–10.5)
CALCIUM SERPL-MCNC: 9.6 MG/DL (ref 8.5–10.5)
CHLORIDE SERPL-SCNC: 107 MMOL/L (ref 96–112)
CO2 SERPL-SCNC: 21 MMOL/L (ref 20–33)
CREAT SERPL-MCNC: 0.8 MG/DL (ref 0.5–1.4)
EKG IMPRESSION: NORMAL
EOSINOPHIL # BLD AUTO: 0 K/UL (ref 0–0.51)
EOSINOPHIL NFR BLD: 0 % (ref 0–6.9)
ERYTHROCYTE [DISTWIDTH] IN BLOOD BY AUTOMATED COUNT: 47.1 FL (ref 35.9–50)
GFR SERPLBLD CREATININE-BSD FMLA CKD-EPI: 102 ML/MIN/1.73 M 2
GLOBULIN SER CALC-MCNC: 2.6 G/DL (ref 1.9–3.5)
GLUCOSE SERPL-MCNC: 162 MG/DL (ref 65–99)
HCT VFR BLD AUTO: 45.9 % (ref 42–52)
HGB BLD-MCNC: 15.5 G/DL (ref 14–18)
IMM GRANULOCYTES # BLD AUTO: 0.03 K/UL (ref 0–0.11)
IMM GRANULOCYTES NFR BLD AUTO: 0.4 % (ref 0–0.9)
LYMPHOCYTES # BLD AUTO: 0.78 K/UL (ref 1–4.8)
LYMPHOCYTES NFR BLD: 10.1 % (ref 22–41)
MCH RBC QN AUTO: 31.1 PG (ref 27–33)
MCHC RBC AUTO-ENTMCNC: 33.8 G/DL (ref 32.3–36.5)
MCV RBC AUTO: 92.2 FL (ref 81.4–97.8)
MONOCYTES # BLD AUTO: 0.05 K/UL (ref 0–0.85)
MONOCYTES NFR BLD AUTO: 0.6 % (ref 0–13.4)
NEUTROPHILS # BLD AUTO: 6.87 K/UL (ref 1.82–7.42)
NEUTROPHILS NFR BLD: 88.6 % (ref 44–72)
NRBC # BLD AUTO: 0 K/UL
NRBC BLD-RTO: 0 /100 WBC (ref 0–0.2)
PLATELET # BLD AUTO: 170 K/UL (ref 164–446)
PMV BLD AUTO: 9.4 FL (ref 9–12.9)
POTASSIUM SERPL-SCNC: 4 MMOL/L (ref 3.6–5.5)
PROT SERPL-MCNC: 6.8 G/DL (ref 6–8.2)
RBC # BLD AUTO: 4.98 M/UL (ref 4.7–6.1)
SODIUM SERPL-SCNC: 141 MMOL/L (ref 135–145)
WBC # BLD AUTO: 7.8 K/UL (ref 4.8–10.8)

## 2024-07-03 PROCEDURE — 36415 COLL VENOUS BLD VENIPUNCTURE: CPT

## 2024-07-03 PROCEDURE — 99291 CRITICAL CARE FIRST HOUR: CPT | Performed by: STUDENT IN AN ORGANIZED HEALTH CARE EDUCATION/TRAINING PROGRAM

## 2024-07-03 PROCEDURE — 70553 MRI BRAIN STEM W/O & W/DYE: CPT

## 2024-07-03 PROCEDURE — 700117 HCHG RX CONTRAST REV CODE 255: Mod: JZ | Performed by: NEUROLOGICAL SURGERY

## 2024-07-03 PROCEDURE — 99285 EMERGENCY DEPT VISIT HI MDM: CPT

## 2024-07-03 PROCEDURE — 700102 HCHG RX REV CODE 250 W/ 637 OVERRIDE(OP): Performed by: STUDENT IN AN ORGANIZED HEALTH CARE EDUCATION/TRAINING PROGRAM

## 2024-07-03 PROCEDURE — 80053 COMPREHEN METABOLIC PANEL: CPT

## 2024-07-03 PROCEDURE — A9270 NON-COVERED ITEM OR SERVICE: HCPCS | Performed by: STUDENT IN AN ORGANIZED HEALTH CARE EDUCATION/TRAINING PROGRAM

## 2024-07-03 PROCEDURE — 85025 COMPLETE CBC W/AUTO DIFF WBC: CPT

## 2024-07-03 PROCEDURE — A9579 GAD-BASE MR CONTRAST NOS,1ML: HCPCS | Mod: JZ | Performed by: NEUROLOGICAL SURGERY

## 2024-07-03 PROCEDURE — 770006 HCHG ROOM/CARE - MED/SURG/GYN SEMI*

## 2024-07-03 PROCEDURE — 93005 ELECTROCARDIOGRAM TRACING: CPT | Performed by: EMERGENCY MEDICINE

## 2024-07-03 RX ORDER — LEVETIRACETAM 500 MG/1
500 TABLET ORAL 2 TIMES DAILY
Status: DISCONTINUED | OUTPATIENT
Start: 2024-07-03 | End: 2024-07-06 | Stop reason: HOSPADM

## 2024-07-03 RX ORDER — DEXAMETHASONE 6 MG/1
6 TABLET ORAL EVERY 8 HOURS
Status: COMPLETED | OUTPATIENT
Start: 2024-07-03 | End: 2024-07-03

## 2024-07-03 RX ORDER — ACETAMINOPHEN 325 MG/1
650 TABLET ORAL EVERY 6 HOURS PRN
Status: DISCONTINUED | OUTPATIENT
Start: 2024-07-03 | End: 2024-07-06 | Stop reason: HOSPADM

## 2024-07-03 RX ORDER — LOSARTAN POTASSIUM 50 MG/1
50 TABLET ORAL DAILY
Status: DISCONTINUED | OUTPATIENT
Start: 2024-07-03 | End: 2024-07-06 | Stop reason: HOSPADM

## 2024-07-03 RX ORDER — LABETALOL HYDROCHLORIDE 5 MG/ML
10 INJECTION, SOLUTION INTRAVENOUS EVERY 4 HOURS PRN
Status: DISCONTINUED | OUTPATIENT
Start: 2024-07-03 | End: 2024-07-06 | Stop reason: HOSPADM

## 2024-07-03 RX ORDER — IBUPROFEN 200 MG
400 TABLET ORAL EVERY 6 HOURS PRN
Status: ON HOLD | COMMUNITY
End: 2024-07-06

## 2024-07-03 RX ADMIN — DEXAMETHASONE 6 MG: 6 TABLET ORAL at 13:50

## 2024-07-03 RX ADMIN — LEVETIRACETAM 500 MG: 500 TABLET, FILM COATED ORAL at 19:09

## 2024-07-03 RX ADMIN — LEVETIRACETAM 500 MG: 500 TABLET, FILM COATED ORAL at 07:54

## 2024-07-03 RX ADMIN — DEXAMETHASONE 6 MG: 6 TABLET ORAL at 07:54

## 2024-07-03 RX ADMIN — ACETAMINOPHEN 650 MG: 325 TABLET, FILM COATED ORAL at 23:56

## 2024-07-03 RX ADMIN — DEXAMETHASONE 6 MG: 6 TABLET ORAL at 23:35

## 2024-07-03 RX ADMIN — GADOTERIDOL 18 ML: 279.3 INJECTION, SOLUTION INTRAVENOUS at 09:45

## 2024-07-03 RX ADMIN — LOSARTAN POTASSIUM 50 MG: 50 TABLET, FILM COATED ORAL at 07:54

## 2024-07-03 ASSESSMENT — ENCOUNTER SYMPTOMS
HEARTBURN: 0
ABDOMINAL PAIN: 0
NAUSEA: 0
FEVER: 0
DIARRHEA: 0
HEADACHES: 0
PALPITATIONS: 0
CHILLS: 0
SHORTNESS OF BREATH: 0
DIZZINESS: 1
WEAKNESS: 1
VOMITING: 0
COUGH: 0

## 2024-07-03 ASSESSMENT — PAIN DESCRIPTION - PAIN TYPE: TYPE: ACUTE PAIN

## 2024-07-03 ASSESSMENT — FIBROSIS 4 INDEX: FIB4 SCORE: 1.5

## 2024-07-04 LAB
ANION GAP SERPL CALC-SCNC: 15 MMOL/L (ref 7–16)
BUN SERPL-MCNC: 13 MG/DL (ref 8–22)
CALCIUM SERPL-MCNC: 9.8 MG/DL (ref 8.5–10.5)
CHLORIDE SERPL-SCNC: 106 MMOL/L (ref 96–112)
CO2 SERPL-SCNC: 20 MMOL/L (ref 20–33)
CREAT SERPL-MCNC: 0.68 MG/DL (ref 0.5–1.4)
ERYTHROCYTE [DISTWIDTH] IN BLOOD BY AUTOMATED COUNT: 45.2 FL (ref 35.9–50)
GFR SERPLBLD CREATININE-BSD FMLA CKD-EPI: 107 ML/MIN/1.73 M 2
GLUCOSE SERPL-MCNC: 151 MG/DL (ref 65–99)
HCT VFR BLD AUTO: 46.1 % (ref 42–52)
HGB BLD-MCNC: 15.7 G/DL (ref 14–18)
MCH RBC QN AUTO: 30.9 PG (ref 27–33)
MCHC RBC AUTO-ENTMCNC: 34.1 G/DL (ref 32.3–36.5)
MCV RBC AUTO: 90.7 FL (ref 81.4–97.8)
PLATELET # BLD AUTO: 195 K/UL (ref 164–446)
PMV BLD AUTO: 9.6 FL (ref 9–12.9)
POTASSIUM SERPL-SCNC: 4.1 MMOL/L (ref 3.6–5.5)
RBC # BLD AUTO: 5.08 M/UL (ref 4.7–6.1)
SODIUM SERPL-SCNC: 141 MMOL/L (ref 135–145)
WBC # BLD AUTO: 8.6 K/UL (ref 4.8–10.8)

## 2024-07-04 PROCEDURE — 97162 PT EVAL MOD COMPLEX 30 MIN: CPT

## 2024-07-04 PROCEDURE — 700102 HCHG RX REV CODE 250 W/ 637 OVERRIDE(OP): Performed by: STUDENT IN AN ORGANIZED HEALTH CARE EDUCATION/TRAINING PROGRAM

## 2024-07-04 PROCEDURE — 36415 COLL VENOUS BLD VENIPUNCTURE: CPT

## 2024-07-04 PROCEDURE — 97166 OT EVAL MOD COMPLEX 45 MIN: CPT

## 2024-07-04 PROCEDURE — 97602 WOUND(S) CARE NON-SELECTIVE: CPT

## 2024-07-04 PROCEDURE — A9270 NON-COVERED ITEM OR SERVICE: HCPCS | Performed by: STUDENT IN AN ORGANIZED HEALTH CARE EDUCATION/TRAINING PROGRAM

## 2024-07-04 PROCEDURE — 770006 HCHG ROOM/CARE - MED/SURG/GYN SEMI*

## 2024-07-04 PROCEDURE — 99232 SBSQ HOSP IP/OBS MODERATE 35: CPT | Performed by: STUDENT IN AN ORGANIZED HEALTH CARE EDUCATION/TRAINING PROGRAM

## 2024-07-04 PROCEDURE — 80048 BASIC METABOLIC PNL TOTAL CA: CPT

## 2024-07-04 PROCEDURE — 85027 COMPLETE CBC AUTOMATED: CPT

## 2024-07-04 RX ADMIN — LOSARTAN POTASSIUM 50 MG: 50 TABLET, FILM COATED ORAL at 05:27

## 2024-07-04 RX ADMIN — LEVETIRACETAM 500 MG: 500 TABLET, FILM COATED ORAL at 05:27

## 2024-07-04 RX ADMIN — LEVETIRACETAM 500 MG: 500 TABLET, FILM COATED ORAL at 18:09

## 2024-07-04 ASSESSMENT — GAIT ASSESSMENTS
DEVIATION: SHUFFLED GAIT;BRADYKINETIC
GAIT LEVEL OF ASSIST: STANDBY ASSIST
DISTANCE (FEET): 100
ASSISTIVE DEVICE: HAND HELD ASSIST

## 2024-07-04 ASSESSMENT — ENCOUNTER SYMPTOMS
SEIZURES: 0
HEADACHES: 1
BLURRED VISION: 1
WEAKNESS: 0
FEVER: 0

## 2024-07-04 ASSESSMENT — COGNITIVE AND FUNCTIONAL STATUS - GENERAL
HELP NEEDED FOR BATHING: A LITTLE
CLIMB 3 TO 5 STEPS WITH RAILING: A LITTLE
DRESSING REGULAR UPPER BODY CLOTHING: A LITTLE
WALKING IN HOSPITAL ROOM: A LITTLE
PERSONAL GROOMING: A LITTLE
SUGGESTED CMS G CODE MODIFIER DAILY ACTIVITY: CK
MOVING TO AND FROM BED TO CHAIR: A LITTLE
TOILETING: A LITTLE
DRESSING REGULAR LOWER BODY CLOTHING: A LITTLE
DAILY ACTIVITIY SCORE: 19
SUGGESTED CMS G CODE MODIFIER MOBILITY: CJ
MOBILITY SCORE: 21

## 2024-07-04 ASSESSMENT — PATIENT HEALTH QUESTIONNAIRE - PHQ9
1. LITTLE INTEREST OR PLEASURE IN DOING THINGS: NOT AT ALL
2. FEELING DOWN, DEPRESSED, IRRITABLE, OR HOPELESS: NOT AT ALL
SUM OF ALL RESPONSES TO PHQ9 QUESTIONS 1 AND 2: 0

## 2024-07-04 ASSESSMENT — PAIN DESCRIPTION - PAIN TYPE
TYPE: ACUTE PAIN
TYPE: ACUTE PAIN

## 2024-07-04 ASSESSMENT — ACTIVITIES OF DAILY LIVING (ADL): TOILETING: REQUIRES ASSIST

## 2024-07-04 ASSESSMENT — FIBROSIS 4 INDEX: FIB4 SCORE: 0.74

## 2024-07-05 PROBLEM — D32.9 MENINGIOMA (HCC): Status: ACTIVE | Noted: 2024-07-05

## 2024-07-05 PROCEDURE — 770006 HCHG ROOM/CARE - MED/SURG/GYN SEMI*

## 2024-07-05 PROCEDURE — 99233 SBSQ HOSP IP/OBS HIGH 50: CPT | Performed by: STUDENT IN AN ORGANIZED HEALTH CARE EDUCATION/TRAINING PROGRAM

## 2024-07-05 PROCEDURE — A9270 NON-COVERED ITEM OR SERVICE: HCPCS | Performed by: STUDENT IN AN ORGANIZED HEALTH CARE EDUCATION/TRAINING PROGRAM

## 2024-07-05 PROCEDURE — 700102 HCHG RX REV CODE 250 W/ 637 OVERRIDE(OP): Performed by: STUDENT IN AN ORGANIZED HEALTH CARE EDUCATION/TRAINING PROGRAM

## 2024-07-05 RX ORDER — DEXAMETHASONE 4 MG/1
4 TABLET ORAL EVERY 12 HOURS
Status: DISCONTINUED | OUTPATIENT
Start: 2024-07-05 | End: 2024-07-06 | Stop reason: HOSPADM

## 2024-07-05 RX ORDER — FAMOTIDINE 20 MG/1
20 TABLET, FILM COATED ORAL DAILY
Status: DISCONTINUED | OUTPATIENT
Start: 2024-07-05 | End: 2024-07-06 | Stop reason: HOSPADM

## 2024-07-05 RX ADMIN — DEXAMETHASONE 4 MG: 4 TABLET ORAL at 17:43

## 2024-07-05 RX ADMIN — FAMOTIDINE 20 MG: 20 TABLET, FILM COATED ORAL at 17:43

## 2024-07-05 RX ADMIN — LOSARTAN POTASSIUM 50 MG: 50 TABLET, FILM COATED ORAL at 04:50

## 2024-07-05 RX ADMIN — LEVETIRACETAM 500 MG: 500 TABLET, FILM COATED ORAL at 17:43

## 2024-07-05 RX ADMIN — LEVETIRACETAM 500 MG: 500 TABLET, FILM COATED ORAL at 04:50

## 2024-07-05 ASSESSMENT — PAIN DESCRIPTION - PAIN TYPE: TYPE: ACUTE PAIN

## 2024-07-06 ENCOUNTER — PHARMACY VISIT (OUTPATIENT)
Dept: PHARMACY | Facility: MEDICAL CENTER | Age: 59
End: 2024-07-06
Payer: COMMERCIAL

## 2024-07-06 VITALS
HEIGHT: 70 IN | HEART RATE: 54 BPM | OXYGEN SATURATION: 95 % | RESPIRATION RATE: 16 BRPM | BODY MASS INDEX: 28.41 KG/M2 | SYSTOLIC BLOOD PRESSURE: 136 MMHG | DIASTOLIC BLOOD PRESSURE: 79 MMHG | WEIGHT: 198.41 LBS | TEMPERATURE: 97.7 F

## 2024-07-06 LAB
ANION GAP SERPL CALC-SCNC: 11 MMOL/L (ref 7–16)
BUN SERPL-MCNC: 18 MG/DL (ref 8–22)
CALCIUM SERPL-MCNC: 9.3 MG/DL (ref 8.5–10.5)
CHLORIDE SERPL-SCNC: 105 MMOL/L (ref 96–112)
CO2 SERPL-SCNC: 25 MMOL/L (ref 20–33)
CREAT SERPL-MCNC: 0.7 MG/DL (ref 0.5–1.4)
ERYTHROCYTE [DISTWIDTH] IN BLOOD BY AUTOMATED COUNT: 47.7 FL (ref 35.9–50)
GFR SERPLBLD CREATININE-BSD FMLA CKD-EPI: 106 ML/MIN/1.73 M 2
GLUCOSE SERPL-MCNC: 150 MG/DL (ref 65–99)
HCT VFR BLD AUTO: 46.8 % (ref 42–52)
HGB BLD-MCNC: 15.7 G/DL (ref 14–18)
MAGNESIUM SERPL-MCNC: 2.2 MG/DL (ref 1.5–2.5)
MCH RBC QN AUTO: 31.2 PG (ref 27–33)
MCHC RBC AUTO-ENTMCNC: 33.5 G/DL (ref 32.3–36.5)
MCV RBC AUTO: 92.9 FL (ref 81.4–97.8)
PHOSPHATE SERPL-MCNC: 3.1 MG/DL (ref 2.5–4.5)
PLATELET # BLD AUTO: 215 K/UL (ref 164–446)
PMV BLD AUTO: 9.1 FL (ref 9–12.9)
POTASSIUM SERPL-SCNC: 4 MMOL/L (ref 3.6–5.5)
RBC # BLD AUTO: 5.04 M/UL (ref 4.7–6.1)
SODIUM SERPL-SCNC: 141 MMOL/L (ref 135–145)
WBC # BLD AUTO: 6.9 K/UL (ref 4.8–10.8)

## 2024-07-06 PROCEDURE — RXMED WILLOW AMBULATORY MEDICATION CHARGE: Performed by: STUDENT IN AN ORGANIZED HEALTH CARE EDUCATION/TRAINING PROGRAM

## 2024-07-06 PROCEDURE — 700102 HCHG RX REV CODE 250 W/ 637 OVERRIDE(OP): Performed by: STUDENT IN AN ORGANIZED HEALTH CARE EDUCATION/TRAINING PROGRAM

## 2024-07-06 PROCEDURE — A9270 NON-COVERED ITEM OR SERVICE: HCPCS | Performed by: STUDENT IN AN ORGANIZED HEALTH CARE EDUCATION/TRAINING PROGRAM

## 2024-07-06 PROCEDURE — 84100 ASSAY OF PHOSPHORUS: CPT

## 2024-07-06 PROCEDURE — 83735 ASSAY OF MAGNESIUM: CPT

## 2024-07-06 PROCEDURE — 99239 HOSP IP/OBS DSCHRG MGMT >30: CPT | Performed by: STUDENT IN AN ORGANIZED HEALTH CARE EDUCATION/TRAINING PROGRAM

## 2024-07-06 PROCEDURE — 85027 COMPLETE CBC AUTOMATED: CPT

## 2024-07-06 PROCEDURE — 80048 BASIC METABOLIC PNL TOTAL CA: CPT

## 2024-07-06 PROCEDURE — 36415 COLL VENOUS BLD VENIPUNCTURE: CPT

## 2024-07-06 RX ORDER — DEXAMETHASONE 4 MG/1
4 TABLET ORAL EVERY 12 HOURS
Qty: 28 TABLET | Refills: 0 | Status: SHIPPED | OUTPATIENT
Start: 2024-07-06 | End: 2024-07-20

## 2024-07-06 RX ORDER — FAMOTIDINE 20 MG/1
20 TABLET, FILM COATED ORAL DAILY
Qty: 30 TABLET | Refills: 0 | Status: SHIPPED | OUTPATIENT
Start: 2024-07-07 | End: 2024-08-06

## 2024-07-06 RX ADMIN — LOSARTAN POTASSIUM 50 MG: 50 TABLET, FILM COATED ORAL at 05:20

## 2024-07-06 RX ADMIN — FAMOTIDINE 20 MG: 20 TABLET, FILM COATED ORAL at 05:20

## 2024-07-06 RX ADMIN — DEXAMETHASONE 4 MG: 4 TABLET ORAL at 05:20

## 2024-07-06 RX ADMIN — LEVETIRACETAM 500 MG: 500 TABLET, FILM COATED ORAL at 05:20

## 2024-07-06 ASSESSMENT — FIBROSIS 4 INDEX: FIB4 SCORE: 0.74

## 2024-07-06 ASSESSMENT — PAIN DESCRIPTION - PAIN TYPE: TYPE: ACUTE PAIN

## 2024-07-08 ENCOUNTER — TELEPHONE (OUTPATIENT)
Dept: HEALTH INFORMATION MANAGEMENT | Facility: OTHER | Age: 59
End: 2024-07-08
Payer: COMMERCIAL

## (undated) DEVICE — SUTURE 2-0 VICRYL PLUS CT-1 - 8 X 18 INCH(12/BX)

## (undated) DEVICE — FORCEPS ELECTROSURGICAL DISPOSABLE CODMAN 8IN 1.5MM

## (undated) DEVICE — TRAY SRGPRP PVP IOD WT PRP - (20/CA)

## (undated) DEVICE — TUBING CLEARLINK DUO-VENT - C-FLO (48EA/CA)

## (undated) DEVICE — SPONGE XRAY 8X4 STERL. 12PL - (10EA/TY 80TY/CA)

## (undated) DEVICE — BLADE CLIPPER FITS 2501 CLIPPER (BLUE)  (20EA/CA)

## (undated) DEVICE — ELECTRODE DUAL RETURN W/ CORD - (50/PK)

## (undated) DEVICE — SET BIFURCATED BLOOD - (48EA/CS)

## (undated) DEVICE — TUBING C&T SET FLYING LEADS DRAIN TUBING (10EA/BX)

## (undated) DEVICE — TIP STRAIGHT SONOPET (5EA/PK)

## (undated) DEVICE — GOWN SURGICAL X-LARGE ULTRA - FILM-REINFORCED (20/CA)

## (undated) DEVICE — TRAY, CV CATH MULTI-LUM.AK-25703

## (undated) DEVICE — DRAPE MICROSCOPE ARMATEC 120IN X 46IN (10EA/CA)

## (undated) DEVICE — SCALP CLIP RANEY 20-1037 (10EA/PK 20PK/CA)

## (undated) DEVICE — SLEEVE VASO CALF MED - (10PR/CA)

## (undated) DEVICE — TRAY SURESTEP FOLEY TEMP SENSING 16FR (10EA/CA) ORDER  #18764 FOR TEMP FOLEY ONLY

## (undated) DEVICE — DRESSING TRANSPARENT FILM TEGADERM 2.375 X 2.75"  (100EA/BX)"

## (undated) DEVICE — CANISTER SUCTION 3000ML MECHANICAL FILTER AUTO SHUTOFF MEDI-VAC NONSTERILE LF DISP  (40EA/CA)

## (undated) DEVICE — DRAPE STRLE REG TOWEL 18X24 - (10/BX 4BX/CA)"

## (undated) DEVICE — GLOVE BIOGEL SZ 8 SURGICAL PF LTX - (50PR/BX 4BX/CA)

## (undated) DEVICE — HEMOSTAT SURG ABSORBABLE - 4 X 8 IN SURGICEL (24EA/CA)

## (undated) DEVICE — DRESSING XEROFORM 1X8 - (50/BX 4BX/CA)

## (undated) DEVICE — TOOL MR8 9CM ACORN 7.5MM DIAMETER (1/EA)

## (undated) DEVICE — LACTATED RINGERS INJ 1000 ML - (14EA/CA 60CA/PF)

## (undated) DEVICE — STAPLER SKIN DISP - (6/BX 10BX/CA) VISISTAT

## (undated) DEVICE — TOWEL STOP TIMEOUT SAFETY FLAG (40EA/CA)

## (undated) DEVICE — SODIUM CHL IRRIGATION 0.9% 1000ML (12EA/CA)

## (undated) DEVICE — SET LEADWIRE 5 LEAD BEDSIDE DISPOSABLE ECG (1SET OF 5/EA)

## (undated) DEVICE — PIN HEAD MAYFIELD DISP. (3EA/PK 12PK/BX)

## (undated) DEVICE — FIBRILLAR SURGICEL 4X4 - 10/CA

## (undated) DEVICE — GOWN WARMING STANDARD FLEX - (30/CA)

## (undated) DEVICE — SURGIFOAM (SIZE 100) - (6EA/CA)

## (undated) DEVICE — BONE WAX (12PK/BX)

## (undated) DEVICE — TIP UNIVERSAL SPETZLER BARRACUDA (5EA/PK)

## (undated) DEVICE — SUTURE 0 VICRYL PLUS CT-2 - 8 X 18 INCH (12/BX)

## (undated) DEVICE — PATTIES SURG X-RAYCOTTONOID - 1/2 X 3 IN (200/CA)

## (undated) DEVICE — BOVIE BLADE COATED &INSULATED - 25/PK

## (undated) DEVICE — TUBE E-T HI-LO CUFF 8.0MM (10EA/PK)

## (undated) DEVICE — SUTURE 4-0 NUROLON CR/8 TF - (12/BX) ETHICON

## (undated) DEVICE — GLOVE BIOGEL INDICATOR SZ 8 SURGICAL PF LTX - (50/BX 4BX/CA)

## (undated) DEVICE — RUBBERBAND STERILE #64 LATEX FREE (100/CA)

## (undated) DEVICE — SET TUBING SONOPET (5EA/PK)

## (undated) DEVICE — KIT RADIAL ARTERY 20GA W/MAX BARRIER AND BIOPATCH  (5EA/CA) #10740 IS FOR THE SET RADIAL ARTERIAL

## (undated) DEVICE — FORCEPS IRRIGATING 8 X 1.5MM (5EA/BX)

## (undated) DEVICE — BATTERY VARISPEED

## (undated) DEVICE — KIT SURGIFLO W/OUT THROMBIN - (6EA/CA)

## (undated) DEVICE — SUCTION INSTRUMENT YANKAUER BULBOUS TIP W/O VENT (50EA/CA)

## (undated) DEVICE — INTRAOP NEURO IN OR 1:1 PER 15 MIN

## (undated) DEVICE — SUTURE GENERAL

## (undated) DEVICE — PACK CRANI - (1EA/CA)

## (undated) DEVICE — SPHERE NAVIGATION STEALTH (5EA/TY 12TY/PK)

## (undated) DEVICE — TRANSDUCER ADULT DISP. SINGLE BONDED STERILE - (20EA/CA)

## (undated) DEVICE — SET EXTENSION WITH 2 PORTS (48EA/CA) ***PART #2C8610 IS A SUBSTITUTE*****

## (undated) DEVICE — DRAPE LARGE 3 QUARTER - (20/CA)

## (undated) DEVICE — TOOL MR8 2.3CM F2/7CM TAPER (1/EA)

## (undated) DEVICE — CORETEMP DRAPE FORM-FITTED EASY DROPANDGO DRAPE FOR USE ON THE CORETEMP FLUID MANAGEMENT 56IN X 56IN

## (undated) DEVICE — LACTATED RINGERS INJ. 500 ML - (24EA/CA)